# Patient Record
Sex: FEMALE | Race: WHITE | NOT HISPANIC OR LATINO | Employment: OTHER | URBAN - METROPOLITAN AREA
[De-identification: names, ages, dates, MRNs, and addresses within clinical notes are randomized per-mention and may not be internally consistent; named-entity substitution may affect disease eponyms.]

---

## 2019-04-12 ENCOUNTER — HOSPITAL ENCOUNTER (EMERGENCY)
Facility: MEDICAL CENTER | Age: 74
End: 2019-04-12
Attending: EMERGENCY MEDICINE
Payer: MEDICARE

## 2019-04-12 VITALS
SYSTOLIC BLOOD PRESSURE: 132 MMHG | RESPIRATION RATE: 18 BRPM | BODY MASS INDEX: 27.25 KG/M2 | HEART RATE: 70 BPM | OXYGEN SATURATION: 94 % | TEMPERATURE: 98.2 F | DIASTOLIC BLOOD PRESSURE: 74 MMHG | HEIGHT: 65 IN | WEIGHT: 163.58 LBS

## 2019-04-12 DIAGNOSIS — R21 RASH: ICD-10-CM

## 2019-04-12 LAB
ALBUMIN SERPL BCP-MCNC: 4.7 G/DL (ref 3.2–4.9)
ALBUMIN/GLOB SERPL: 1.6 G/DL
ALP SERPL-CCNC: 54 U/L (ref 30–99)
ALT SERPL-CCNC: 19 U/L (ref 2–50)
ANION GAP SERPL CALC-SCNC: 7 MMOL/L (ref 0–11.9)
AST SERPL-CCNC: 24 U/L (ref 12–45)
BASOPHILS # BLD AUTO: 1.4 % (ref 0–1.8)
BASOPHILS # BLD: 0.1 K/UL (ref 0–0.12)
BILIRUB SERPL-MCNC: 0.4 MG/DL (ref 0.1–1.5)
BUN SERPL-MCNC: 20 MG/DL (ref 8–22)
CALCIUM SERPL-MCNC: 10.2 MG/DL (ref 8.5–10.5)
CHLORIDE SERPL-SCNC: 105 MMOL/L (ref 96–112)
CO2 SERPL-SCNC: 27 MMOL/L (ref 20–33)
CREAT SERPL-MCNC: 0.82 MG/DL (ref 0.5–1.4)
EOSINOPHIL # BLD AUTO: 0.72 K/UL (ref 0–0.51)
EOSINOPHIL NFR BLD: 10.1 % (ref 0–6.9)
ERYTHROCYTE [DISTWIDTH] IN BLOOD BY AUTOMATED COUNT: 44.2 FL (ref 35.9–50)
GLOBULIN SER CALC-MCNC: 2.9 G/DL (ref 1.9–3.5)
GLUCOSE SERPL-MCNC: 97 MG/DL (ref 65–99)
HCT VFR BLD AUTO: 42.6 % (ref 37–47)
HGB BLD-MCNC: 13.7 G/DL (ref 12–16)
IMM GRANULOCYTES # BLD AUTO: 0.01 K/UL (ref 0–0.11)
IMM GRANULOCYTES NFR BLD AUTO: 0.1 % (ref 0–0.9)
LYMPHOCYTES # BLD AUTO: 1.95 K/UL (ref 1–4.8)
LYMPHOCYTES NFR BLD: 27.4 % (ref 22–41)
MCH RBC QN AUTO: 31.2 PG (ref 27–33)
MCHC RBC AUTO-ENTMCNC: 32.2 G/DL (ref 33.6–35)
MCV RBC AUTO: 97 FL (ref 81.4–97.8)
MONOCYTES # BLD AUTO: 0.5 K/UL (ref 0–0.85)
MONOCYTES NFR BLD AUTO: 7 % (ref 0–13.4)
NEUTROPHILS # BLD AUTO: 3.84 K/UL (ref 2–7.15)
NEUTROPHILS NFR BLD: 54 % (ref 44–72)
NRBC # BLD AUTO: 0 K/UL
NRBC BLD-RTO: 0 /100 WBC
PLATELET # BLD AUTO: 350 K/UL (ref 164–446)
PMV BLD AUTO: 8.9 FL (ref 9–12.9)
POTASSIUM SERPL-SCNC: 4.4 MMOL/L (ref 3.6–5.5)
PROT SERPL-MCNC: 7.6 G/DL (ref 6–8.2)
RBC # BLD AUTO: 4.39 M/UL (ref 4.2–5.4)
SODIUM SERPL-SCNC: 139 MMOL/L (ref 135–145)
T4 FREE SERPL-MCNC: 0.72 NG/DL (ref 0.53–1.43)
TSH SERPL DL<=0.005 MIU/L-ACNC: 3.27 UIU/ML (ref 0.38–5.33)
WBC # BLD AUTO: 7.1 K/UL (ref 4.8–10.8)

## 2019-04-12 PROCEDURE — 85025 COMPLETE CBC W/AUTO DIFF WBC: CPT

## 2019-04-12 PROCEDURE — 84443 ASSAY THYROID STIM HORMONE: CPT

## 2019-04-12 PROCEDURE — 84439 ASSAY OF FREE THYROXINE: CPT

## 2019-04-12 PROCEDURE — 99284 EMERGENCY DEPT VISIT MOD MDM: CPT

## 2019-04-12 PROCEDURE — 700111 HCHG RX REV CODE 636 W/ 250 OVERRIDE (IP): Performed by: EMERGENCY MEDICINE

## 2019-04-12 PROCEDURE — 80053 COMPREHEN METABOLIC PANEL: CPT

## 2019-04-12 RX ORDER — CEPHALEXIN 500 MG/1
500 CAPSULE ORAL 3 TIMES DAILY
Qty: 30 CAP | Refills: 0 | Status: SHIPPED | OUTPATIENT
Start: 2019-04-12 | End: 2019-04-22

## 2019-04-12 RX ORDER — IBUPROFEN 200 MG
200 TABLET ORAL EVERY 6 HOURS PRN
Status: SHIPPED | COMMUNITY
End: 2022-07-12

## 2019-04-12 RX ORDER — PREDNISONE 20 MG/1
20 TABLET ORAL DAILY
Qty: 3 TAB | Refills: 0 | Status: SHIPPED | OUTPATIENT
Start: 2019-04-12 | End: 2019-11-21

## 2019-04-12 RX ORDER — PREDNISONE 20 MG/1
20 TABLET ORAL ONCE
Status: COMPLETED | OUTPATIENT
Start: 2019-04-12 | End: 2019-04-12

## 2019-04-12 RX ORDER — ACETAMINOPHEN 325 MG/1
650 TABLET ORAL EVERY 4 HOURS PRN
COMMUNITY
End: 2020-03-13

## 2019-04-12 RX ADMIN — PREDNISONE 20 MG: 20 TABLET ORAL at 13:35

## 2019-04-12 NOTE — ED PROVIDER NOTES
ED Provider Note    Chief Complaint:   Rash    HPI:  Patrica Qiu is a 73 y.o. female who presents with rash.  Symptoms began 6 months ago, when patient noticed a small rash on her back.  This was initially thought to be herpes zoster, she was treated with a course of acyclovir.  Rash did not significantly improved, seem to worsen 4 months ago.  She was seen by another physician and treated again with acyclovir for zoster.  Rash has progressively spread and worsened since that time.  She has had no associated fevers, no bruising, no blistering lesions.  Denies any headaches, neck pain, nor back pain.  Denies any new exposures, though she did temporarily start washing her closed with a different detergent, she has since switched back to her regular laundry regimen.  Denies any new foods.    There is a strong family history of thyroid disorder, patient has never been diagnosed with thyroid disorder.  She is no chest pain, no shortness of breath, no diarrhea, no vomiting.  No history of severe allergic reaction.    Review of Systems:  See HPI for pertinent positives and negatives. All other systems negative.    Past Medical History:       Social History:  Social History     Social History Main Topics   • Smoking status: Current Every Day Smoker     Packs/day: 0.50   • Smokeless tobacco: Never Used   • Alcohol use Yes      Comment: 1/day   • Drug use: No   • Sexual activity: Not on file       Surgical History:   has a past surgical history that includes tubal ligation (1969) and dilation and curettage (1990).    Current Medications:  Home Medications     Reviewed by Kia Avina R.N. (Registered Nurse) on 04/12/19 at 1216  Med List Status: Complete   Medication Last Dose Status   acetaminophen (TYLENOL) 325 MG Tab 4/12/2019 Active   ibuprofen (MOTRIN) 100 MG/5ML Suspension  Active   ibuprofen (MOTRIN) 200 MG Tab 4/12/2019 Active                Allergies:  No Known Allergies    Physical Exam:  Vital  "Signs: /74   Pulse 70   Temp 36.8 °C (98.2 °F) (Temporal)   Resp 18   Ht 1.651 m (5' 5\")   Wt 74.2 kg (163 lb 9.3 oz)   SpO2 94%   BMI 27.22 kg/m²   Constitutional: Alert, no acute distress  HENT: Moist mucus membranes, no intraoral lesions  Eyes: Pupils equal and reactive, normal conjunctiva  Neck: Supple, normal range of motion, no stridor  Cardiovascular: Extremities are warm and well perfused, no murmur appreciated, normal cardiac auscultation  Pulmonary: No respiratory distress, normal work of breathing, no accessory muscule usage, breath sounds clear and equal bilaterally  Abdomen: Soft, non-distended, non-tender to palpation, no peritoneal signs  Skin: Warm, dry, multiple excoriated lesions in the areas where she is able to scratch, scaling skin bilaterally across the upper back.  No vesicular lesions, no lesions that are present in a dermatomal distribution, no weeping skin.  No petechiae, no purpura, no desquamating areas.  Musculoskeletal: Normal range of motion in all extremities, no swelling or deformity noted  Neurologic: Alert, oriented, normal speech, normal motor function  Psychiatric: Normal and appropriate mood and affect    No recent medical records available for review.    Labs:  Labs Reviewed   CBC WITH DIFFERENTIAL - Abnormal; Notable for the following:        Result Value    MCHC 32.2 (*)     MPV 8.9 (*)     Eosinophils 10.10 (*)     Eos (Absolute) 0.72 (*)     All other components within normal limits   COMP METABOLIC PANEL   TSH   FREE THYROXINE   ESTIMATED GFR     ED Medications Administered:  Medications   predniSONE (DELTASONE) tablet 20 mg (20 mg Oral Given 4/12/19 1335)       Differential diagnosis:  Eczema, contact dermatitis, allergic reaction, superimposed bacterial infection, Thyroid disorder, id reaction    MDM:  History and physical exam as documented above.  Patient presents out of concern for shingles, however appearance is not dermatomal at all.  Appearance is more " similar with a contact dermatitis or allergic dermatitis.  Rash is worse in areas where she is able to scratch.  Labs evaluated, CBC and CMP are reassuring.  There is a strong history of thyroid disorder in the family, screening TSH and free T4 are within normal limits.  Plan at this time is for treatment as contact dermatitis, she is instructed to take Zyrtec and Pepcid according to label instructions.  She does not like Benadryl as it makes her drowsy.  Additionally will prescribe a short course of steroids to see if this improves her symptoms.  There are some excoriated lesions on the back that look as though there may be a secondary bacterial infection.  If her symptoms are not improved with antihistamines and steroids over the next 24-48 hours, she will start Keflex to see if that helps her symptoms.  She is referred back to primary care, counseled that she may ultimately need dermatology referral.  Return precautions given including worsening rash, fevers, lesions, or any further concerns.    Blood pressure today is greater than 120/80, patient is instructed to follow up with primary care provider for blood pressure recheck.    Disposition:  Discharged home in stable condition    Final Impression:  1. Rash      Electronically signed by: Kinza Sims, 4/12/2019 5:51 PM

## 2019-04-12 NOTE — DISCHARGE INSTRUCTIONS
You may take Zyrtec and Pepcid according to label instructions for itching.  Additionally may take Benadryl according to label instructions.  Please take the prednisone as prescribed.  Return to the emergency department if you develop any new or worsening symptoms including worsening itching, rashes, fevers, bruising, or any further concerns.    If your symptoms are not improving in 2-3 days, you may try the antibiotic in the event that this is an overlying skin infection.  Please contact your primary care physician today to schedule a follow-up appointment for complete recheck within 24-48 hours.

## 2019-04-12 NOTE — ED TRIAGE NOTES
"Chief Complaint   Patient presents with   • Herpes Zoster     x1 week, rash noted to upper chest and back     Patient ambulatory to triage with daughter; reports hx of shingles, first diagnosis Oct 2018, unknown medication \"helped rash improve, but never went away\".    Explained wait time and triage process to pt. Pt placed back out in lobby, told to notify ED tech or triage RN of any changes, verbalized understanding.    "

## 2019-04-30 ENCOUNTER — HOSPITAL ENCOUNTER (OUTPATIENT)
Dept: LAB | Facility: MEDICAL CENTER | Age: 74
End: 2019-04-30
Attending: FAMILY MEDICINE
Payer: MEDICARE

## 2019-04-30 LAB — URATE SERPL-MCNC: 3.6 MG/DL (ref 1.9–8.2)

## 2019-04-30 PROCEDURE — 84550 ASSAY OF BLOOD/URIC ACID: CPT

## 2019-04-30 PROCEDURE — 86038 ANTINUCLEAR ANTIBODIES: CPT

## 2019-04-30 PROCEDURE — 36415 COLL VENOUS BLD VENIPUNCTURE: CPT

## 2019-05-02 LAB — NUCLEAR IGG SER QL IA: NORMAL

## 2019-11-21 ENCOUNTER — OFFICE VISIT (OUTPATIENT)
Dept: URGENT CARE | Facility: CLINIC | Age: 74
End: 2019-11-21
Payer: MEDICARE

## 2019-11-21 VITALS
DIASTOLIC BLOOD PRESSURE: 70 MMHG | WEIGHT: 169.8 LBS | RESPIRATION RATE: 20 BRPM | TEMPERATURE: 99 F | OXYGEN SATURATION: 92 % | HEART RATE: 66 BPM | BODY MASS INDEX: 28.29 KG/M2 | HEIGHT: 65 IN | SYSTOLIC BLOOD PRESSURE: 140 MMHG

## 2019-11-21 DIAGNOSIS — J20.9 ACUTE BRONCHITIS WITH COPD (HCC): ICD-10-CM

## 2019-11-21 DIAGNOSIS — R21 RASH: ICD-10-CM

## 2019-11-21 DIAGNOSIS — J44.0 ACUTE BRONCHITIS WITH COPD (HCC): ICD-10-CM

## 2019-11-21 PROCEDURE — 99204 OFFICE O/P NEW MOD 45 MIN: CPT | Performed by: FAMILY MEDICINE

## 2019-11-21 RX ORDER — AZITHROMYCIN 250 MG/1
TABLET, FILM COATED ORAL
Qty: 6 TAB | Refills: 0 | Status: SHIPPED
Start: 2019-11-21 | End: 2020-03-13

## 2019-11-21 RX ORDER — TRIAMCINOLONE ACETONIDE 1 MG/G
1 CREAM TOPICAL 2 TIMES DAILY
Qty: 1 TUBE | Refills: 0 | Status: SHIPPED | OUTPATIENT
Start: 2019-11-21 | End: 2021-05-14

## 2019-11-21 RX ORDER — FLUOXETINE HYDROCHLORIDE 20 MG/1
CAPSULE ORAL
COMMUNITY
Start: 2019-09-18 | End: 2020-03-13

## 2019-11-21 RX ORDER — PREDNISONE 10 MG/1
40 TABLET ORAL DAILY
Qty: 20 TAB | Refills: 0 | Status: SHIPPED | OUTPATIENT
Start: 2019-11-21 | End: 2019-11-26

## 2019-11-21 RX ORDER — ALBUTEROL SULFATE 90 UG/1
AEROSOL, METERED RESPIRATORY (INHALATION)
Refills: 1 | COMMUNITY
Start: 2019-11-18 | End: 2021-02-04 | Stop reason: SDUPTHER

## 2019-11-21 ASSESSMENT — PATIENT HEALTH QUESTIONNAIRE - PHQ9: CLINICAL INTERPRETATION OF PHQ2 SCORE: 0

## 2019-11-21 ASSESSMENT — ENCOUNTER SYMPTOMS
COUGH: 1
CHILLS: 0
FEVER: 0

## 2019-11-21 NOTE — PROGRESS NOTES
Subjective:     Patrica Qiu  is a 73 y.o. female who presents for Cough (lower left side of abdomen pain as patient coughs, wheezes x 3 day) and Rash (cgest and neck, had shingles, had a skin infection before: x10/11/18 )       Cough   This is a new problem. The current episode started 1 to 4 weeks ago. The problem has been waxing and waning. The problem occurs every few minutes. The cough is non-productive. Associated symptoms include a rash. Pertinent negatives include no chest pain, chills, fever, myalgias, sore throat or shortness of breath.   Rash   This is a new problem. The current episode started more than 1 month ago. The problem has been gradually worsening since onset. The affected locations include the chest and neck. The rash is characterized by dryness, redness, itchiness and peeling. Associated symptoms include coughing. Pertinent negatives include no eye pain, fever, shortness of breath, sore throat or vomiting.   History reviewed. No pertinent past medical history.  Past Surgical History:   Procedure Laterality Date   • DILATION AND CURETTAGE  1990   • TUBAL LIGATION  1969     Social History     Socioeconomic History   • Marital status: Single     Spouse name: Not on file   • Number of children: Not on file   • Years of education: Not on file   • Highest education level: Not on file   Occupational History   • Not on file   Social Needs   • Financial resource strain: Not on file   • Food insecurity:     Worry: Not on file     Inability: Not on file   • Transportation needs:     Medical: Not on file     Non-medical: Not on file   Tobacco Use   • Smoking status: Current Every Day Smoker     Packs/day: 0.50   • Smokeless tobacco: Never Used   Substance and Sexual Activity   • Alcohol use: Not Currently     Comment: 1/day   • Drug use: No   • Sexual activity: Not on file   Lifestyle   • Physical activity:     Days per week: Not on file     Minutes per session: Not on file   • Stress: Not on  "file   Relationships   • Social connections:     Talks on phone: Not on file     Gets together: Not on file     Attends Buddhism service: Not on file     Active member of club or organization: Not on file     Attends meetings of clubs or organizations: Not on file     Relationship status: Not on file   • Intimate partner violence:     Fear of current or ex partner: Not on file     Emotionally abused: Not on file     Physically abused: Not on file     Forced sexual activity: Not on file   Other Topics Concern   • Not on file   Social History Narrative   • Not on file    History reviewed. No pertinent family history. Review of Systems   Constitutional: Negative for chills and fever.   HENT: Negative for sore throat.    Eyes: Negative for pain.   Respiratory: Positive for cough. Negative for shortness of breath.    Cardiovascular: Negative for chest pain.   Gastrointestinal: Negative for nausea and vomiting.   Genitourinary: Negative for hematuria.   Musculoskeletal: Negative for myalgias.   Skin: Positive for rash.   Neurological: Negative for dizziness.   No Known Allergies   Objective:   /70 (BP Location: Left arm, Patient Position: Sitting, BP Cuff Size: Adult)   Pulse 66   Temp 37.2 °C (99 °F) (Temporal)   Resp 20   Ht 1.651 m (5' 5\")   Wt 77 kg (169 lb 12.8 oz)   SpO2 92%   BMI 28.26 kg/m²   Physical Exam  Constitutional:       General: She is not in acute distress.     Appearance: She is well-developed.   HENT:      Head: Normocephalic and atraumatic.   Eyes:      Conjunctiva/sclera: Conjunctivae normal.      Pupils: Pupils are equal, round, and reactive to light.   Cardiovascular:      Rate and Rhythm: Normal rate and regular rhythm.      Heart sounds: No murmur.   Pulmonary:      Effort: Pulmonary effort is normal. No respiratory distress.      Breath sounds: Wheezing present. No rhonchi or rales.   Abdominal:      General: There is no distension.      Palpations: Abdomen is soft.      " Tenderness: There is no tenderness.   Skin:     General: Skin is warm and dry.      Findings: Rash present. Rash is crusting, macular and scaling.   Neurological:      Mental Status: She is alert and oriented to person, place, and time.      Sensory: No sensory deficit.      Deep Tendon Reflexes: Reflexes are normal and symmetric.           Assessment/Plan:   Assessment    1. Rash  - REFERRAL TO DERMATOLOGY  - triamcinolone acetonide (KENALOG) 0.1 % Cream; Apply 1 Film to affected area(s) 2 times a day.  Dispense: 1 Tube; Refill: 0    2. Acute bronchitis with COPD (HCC)  - REFERRAL TO PULMONOLOGY  - azithromycin (ZITHROMAX) 250 MG Tab; Take 2 tablets by mouth on day one. Take one tablet by mouth the remaining days until gone  Dispense: 6 Tab; Refill: 0  - predniSONE (DELTASONE) 10 MG Tab; Take 4 Tabs by mouth every day for 5 days.  Dispense: 20 Tab; Refill: 0    Other orders  - FLUoxetine (PROZAC) 20 MG Cap  - VENTOLIN  (90 Base) MCG/ACT Aero Soln inhalation aerosol; INHALE 2 PUFFS BY MOUTH 4 TIMES DAILY FOR 30 DAYS AS NEEDED FOR WHEEZING; Refill: 1    Differential diagnosis, natural history, supportive care, and indications for immediate follow-up discussed.

## 2019-11-22 ASSESSMENT — ENCOUNTER SYMPTOMS
MYALGIAS: 0
DIZZINESS: 0
SHORTNESS OF BREATH: 0
NAUSEA: 0
SORE THROAT: 0
VOMITING: 0
EYE PAIN: 0

## 2019-12-16 ENCOUNTER — OFFICE VISIT (OUTPATIENT)
Dept: PULMONOLOGY | Facility: HOSPICE | Age: 74
End: 2019-12-16
Payer: MEDICARE

## 2019-12-16 ENCOUNTER — HOSPITAL ENCOUNTER (OUTPATIENT)
Dept: RADIOLOGY | Facility: MEDICAL CENTER | Age: 74
End: 2019-12-16

## 2019-12-16 VITALS
HEIGHT: 65 IN | SYSTOLIC BLOOD PRESSURE: 116 MMHG | OXYGEN SATURATION: 92 % | BODY MASS INDEX: 28.91 KG/M2 | DIASTOLIC BLOOD PRESSURE: 74 MMHG | WEIGHT: 173.5 LBS | HEART RATE: 85 BPM

## 2019-12-16 DIAGNOSIS — Z23 IMMUNIZATION DUE: ICD-10-CM

## 2019-12-16 DIAGNOSIS — R05.9 COUGH: ICD-10-CM

## 2019-12-16 DIAGNOSIS — F17.200 SMOKING: ICD-10-CM

## 2019-12-16 DIAGNOSIS — Z72.0 TOBACCO ABUSE: ICD-10-CM

## 2019-12-16 PROCEDURE — G0009 ADMIN PNEUMOCOCCAL VACCINE: HCPCS | Performed by: INTERNAL MEDICINE

## 2019-12-16 PROCEDURE — 99406 BEHAV CHNG SMOKING 3-10 MIN: CPT | Mod: 25 | Performed by: INTERNAL MEDICINE

## 2019-12-16 PROCEDURE — G0008 ADMIN INFLUENZA VIRUS VAC: HCPCS | Performed by: INTERNAL MEDICINE

## 2019-12-16 PROCEDURE — 99204 OFFICE O/P NEW MOD 45 MIN: CPT | Mod: 25 | Performed by: INTERNAL MEDICINE

## 2019-12-16 PROCEDURE — 90670 PCV13 VACCINE IM: CPT | Performed by: INTERNAL MEDICINE

## 2019-12-16 PROCEDURE — 90662 IIV NO PRSV INCREASED AG IM: CPT | Performed by: INTERNAL MEDICINE

## 2019-12-16 RX ORDER — ALBUTEROL SULFATE 90 UG/1
2 AEROSOL, METERED RESPIRATORY (INHALATION) EVERY 4 HOURS PRN
Qty: 1 INHALER | Refills: 6 | Status: SHIPPED | OUTPATIENT
Start: 2019-12-16 | End: 2020-03-13 | Stop reason: SDUPTHER

## 2019-12-16 RX ORDER — TIOTROPIUM BROMIDE 18 UG/1
CAPSULE ORAL; RESPIRATORY (INHALATION)
Qty: 30 CAP | Refills: 6 | Status: SHIPPED
Start: 2019-12-16 | End: 2020-03-13

## 2019-12-16 RX ORDER — FLUTICASONE PROPIONATE 50 MCG
1-2 SPRAY, SUSPENSION (ML) NASAL DAILY
Qty: 1 BOTTLE | Refills: 6 | Status: SHIPPED | OUTPATIENT
Start: 2019-12-16 | End: 2021-05-14

## 2019-12-16 NOTE — PROGRESS NOTES
Chief Complaint   Patient presents with   • Establish Care     Referred by Dr Nilson Vitale for Acute Bronchitis with COPD       Problems:   1. Smoking    2. Tobacco abuse    3. Immunization due    4. Cough        Assessment/Plan:   # Tobacco abuse   -- Current smoker for over 30 years with 15 pack years who is now interested to quit smoking. Patient has tried nicotine patch in the past and has horrible nightmares. Discussed about smoking cessations and available therapies for 5 minutes. Risk associated with smoking includes, but not limited to increased cardiovascular disease, lung disease, cancer, and osteoporosis.  Patient is motivated now and would like to try to quit on her own. Will let us know if she need any additional therapy.  -- Check PFTs to confirm airflow obstruction   -- Referral to lung cancer screening program given risk factors   -- Immunizations as below     # Cough   -- Concern for smoker's cough vs COPD given her longstanding history of smoking. Other differentials include GERD vs asthma vs nonasthmatic eosinophilic bronchitis vs medication induced cough. Ibuprofen is associated with cough and to the HADLEY inhibitor pathway.   -- Check PFTs to confirm airflow obstruction   -- Start spiriva given high clinical risk factor for COPD and frequent use of her inhalers   -- Start flonase daily and take clarithin as needed for allergies   -- Smoking cessation as above     # Immunizations   -- Offered flu vaccination but refused.   -- Agreed to get prevnar vaccination  -- Will need PPSV23 next year     HPI: Ms. Qiu is a 73 year old female here today to establish care for cough evaluation. Patient has a longstanding history of smoking and has cut down to 3-6 cigarettes per day. Her cough started a few months back and is associated with intermittent yellow phlegm production. She state feeling short of breath on exertion. Also has intermittent running nose with post nasal drip and subjective chills.  Denies chest pain, recent sick contact, N/V, orthopnea, PND, or LE swelling. She state having a history of allergies but has never been test for it. Never had PFTs to confirm asthma or COPD.     Patient is a current smoker and has cut down to 3-6 cigs per day. She has tried nicotine patch in the past and has horrible nightmares. No weight loss or night sweats. She uses her ventolin 4 times day. She has 15 cats.       Past Medical History:   Diagnosis Date   • Chest tightness    • Cough    • Shortness of breath    • Sputum production    • Wheezing        Past Surgical History:   Procedure Laterality Date   • DILATION AND CURETTAGE  1990   • TUBAL LIGATION  1969       ROS:   Constitutional: Denies fevers, chills, night sweats, fatigue or weight loss  Eyes: Denies vision loss, pain, drainage, double vision  Ears, Nose, Throat: Denies earache, tinnitus, hoarseness  Cardiovascular: Denies chest pain, tightness, palpitations  Respiratory: See HPI  Sleep: See HPI   GI: Denies abdominal pain, nausea, vomiting, diarrhea  : Denies frequent urination, hematuria, painful urination  Musculoskeletal: Denies back pain, painful joints, sore muscles  Neurological: Denies headaches, seizures  Skin: Denies rashes, color changes  Psychiatric: Denies depression or thoughts of suicide  Hematologic: Denies bleeding tendency or clotting tendency  Allergic/Immunologic: Denies rhinitis, skin sensitivity    Social History     Socioeconomic History   • Marital status: Single     Spouse name: Not on file   • Number of children: Not on file   • Years of education: Not on file   • Highest education level: Not on file   Occupational History   • Not on file   Social Needs   • Financial resource strain: Not on file   • Food insecurity:     Worry: Not on file     Inability: Not on file   • Transportation needs:     Medical: Not on file     Non-medical: Not on file   Tobacco Use   • Smoking status: Current Every Day Smoker     Packs/day: 0.25      "Years: 32.00     Pack years: 8.00     Types: Cigarettes   • Smokeless tobacco: Never Used   Substance and Sexual Activity   • Alcohol use: Not Currently     Comment: 1/day   • Drug use: No   • Sexual activity: Not on file   Lifestyle   • Physical activity:     Days per week: Not on file     Minutes per session: Not on file   • Stress: Not on file   Relationships   • Social connections:     Talks on phone: Not on file     Gets together: Not on file     Attends Synagogue service: Not on file     Active member of club or organization: Not on file     Attends meetings of clubs or organizations: Not on file     Relationship status: Not on file   • Intimate partner violence:     Fear of current or ex partner: Not on file     Emotionally abused: Not on file     Physically abused: Not on file     Forced sexual activity: Not on file   Other Topics Concern   • Not on file   Social History Narrative   • Not on file     Patient has no known allergies.  Current Outpatient Medications on File Prior to Visit   Medication Sig Dispense Refill   • FLUoxetine (PROZAC) 20 MG Cap      • VENTOLIN  (90 Base) MCG/ACT Aero Soln inhalation aerosol INHALE 2 PUFFS BY MOUTH 4 TIMES DAILY FOR 30 DAYS AS NEEDED FOR WHEEZING  1   • azithromycin (ZITHROMAX) 250 MG Tab Take 2 tablets by mouth on day one. Take one tablet by mouth the remaining days until gone 6 Tab 0   • triamcinolone acetonide (KENALOG) 0.1 % Cream Apply 1 Film to affected area(s) 2 times a day. 1 Tube 0   • ibuprofen (MOTRIN) 100 MG/5ML Suspension Take 10 mg/kg by mouth every 6 hours as needed.     • ibuprofen (MOTRIN) 200 MG Tab Take 200 mg by mouth every 6 hours as needed.     • acetaminophen (TYLENOL) 325 MG Tab Take 650 mg by mouth every four hours as needed.       No current facility-administered medications on file prior to visit.      /74 (BP Location: Left arm, Patient Position: Sitting, BP Cuff Size: Adult)   Pulse 85   Ht 1.651 m (5' 5\")   Wt 78.7 kg (173 " lb 8 oz)   SpO2 92%   History reviewed. No pertinent family history.    There is no immunization history on file for this patient.      Physical Exam:  General: Nontoxic appearing.   HEENT: PERRLA, EOMI, no scleral icterus, no nasal or oral lesions  Neck: No thyromegaly, no adenopathy, no bruits  Mallampatti: Grade II  Lungs: Equal breath sounds, no wheezes or crackles  Heart: Regular rate and rhythm, no gallops or murmurs  Abdomen: Soft, benign, no organomegaly  Extremities: No clubbing, cyanosis, or edema  Neurologic: Cranial nerve, motor, and sensory exam are normal    CXR reviewed: no acute pulmonary process    Shanua Oliver MD   Pulmonary Critical Care   [facility]

## 2019-12-17 ENCOUNTER — TELEPHONE (OUTPATIENT)
Dept: HEMATOLOGY ONCOLOGY | Facility: MEDICAL CENTER | Age: 74
End: 2019-12-17

## 2020-03-10 ENCOUNTER — APPOINTMENT (RX ONLY)
Dept: URBAN - NONMETROPOLITAN AREA CLINIC 15 | Facility: CLINIC | Age: 75
Setting detail: DERMATOLOGY
End: 2020-03-10

## 2020-03-10 DIAGNOSIS — R21 RASH AND OTHER NONSPECIFIC SKIN ERUPTION: ICD-10-CM

## 2020-03-10 PROCEDURE — ? COUNSELING

## 2020-03-10 PROCEDURE — 99202 OFFICE O/P NEW SF 15 MIN: CPT

## 2020-03-10 PROCEDURE — ? PRESCRIPTION

## 2020-03-10 PROCEDURE — ? ORDER TESTS

## 2020-03-10 RX ORDER — MUPIROCIN 20 MG/G
OINTMENT TOPICAL
Qty: 1 | Refills: 3 | Status: ERX | COMMUNITY
Start: 2020-03-10

## 2020-03-10 RX ORDER — TRIAMCINOLONE ACETONIDE 1 MG/G
CREAM TOPICAL BID
Qty: 1 | Refills: 3 | Status: ERX | COMMUNITY
Start: 2020-03-10

## 2020-03-10 RX ADMIN — TRIAMCINOLONE ACETONIDE: 1 CREAM TOPICAL at 00:00

## 2020-03-10 RX ADMIN — MUPIROCIN: 20 OINTMENT TOPICAL at 00:00

## 2020-03-10 ASSESSMENT — LOCATION ZONE DERM
LOCATION ZONE: FACE
LOCATION ZONE: LEG
LOCATION ZONE: ARM
LOCATION ZONE: TRUNK

## 2020-03-10 ASSESSMENT — LOCATION SIMPLE DESCRIPTION DERM
LOCATION SIMPLE: RIGHT FOREHEAD
LOCATION SIMPLE: CHEST
LOCATION SIMPLE: LEFT FOREARM
LOCATION SIMPLE: LEFT POSTERIOR THIGH

## 2020-03-10 ASSESSMENT — LOCATION DETAILED DESCRIPTION DERM
LOCATION DETAILED: LEFT PROXIMAL POSTERIOR THIGH
LOCATION DETAILED: LEFT PROXIMAL DORSAL FOREARM
LOCATION DETAILED: MIDDLE STERNUM
LOCATION DETAILED: RIGHT INFERIOR LATERAL FOREHEAD
LOCATION DETAILED: STERNAL NOTCH

## 2020-03-10 NOTE — PROCEDURE: ORDER TESTS
Bill For Surgical Tray: no
Billing Type: Third-Party Bill
Expected Date Of Service: 03/10/2020
Performing Laboratory: [id number]

## 2020-03-10 NOTE — PROCEDURE: COUNSELING
Detail Level: Zone
Patient Specific Counseling (Will Not Stick From Patient To Patient): Discussed allergic rxn vs photo derm.  If pt does not respond to tx, bx and further tx is needed.

## 2020-03-13 ENCOUNTER — NON-PROVIDER VISIT (OUTPATIENT)
Dept: PULMONOLOGY | Facility: HOSPICE | Age: 75
End: 2020-03-13
Attending: INTERNAL MEDICINE
Payer: MEDICARE

## 2020-03-13 ENCOUNTER — OFFICE VISIT (OUTPATIENT)
Dept: PULMONOLOGY | Facility: HOSPICE | Age: 75
End: 2020-03-13
Payer: MEDICARE

## 2020-03-13 VITALS
DIASTOLIC BLOOD PRESSURE: 70 MMHG | HEIGHT: 65 IN | SYSTOLIC BLOOD PRESSURE: 126 MMHG | BODY MASS INDEX: 29.16 KG/M2 | OXYGEN SATURATION: 92 % | HEART RATE: 75 BPM | WEIGHT: 175 LBS

## 2020-03-13 VITALS — HEIGHT: 65 IN | WEIGHT: 175 LBS | BODY MASS INDEX: 29.16 KG/M2

## 2020-03-13 DIAGNOSIS — Z72.0 TOBACCO ABUSE: ICD-10-CM

## 2020-03-13 DIAGNOSIS — F17.200 SMOKING: ICD-10-CM

## 2020-03-13 DIAGNOSIS — J45.40 MODERATE PERSISTENT ASTHMA, UNSPECIFIED WHETHER COMPLICATED: ICD-10-CM

## 2020-03-13 DIAGNOSIS — R05.9 COUGH: ICD-10-CM

## 2020-03-13 DIAGNOSIS — J44.9 MODERATE COPD (CHRONIC OBSTRUCTIVE PULMONARY DISEASE) (HCC): ICD-10-CM

## 2020-03-13 PROCEDURE — 99213 OFFICE O/P EST LOW 20 MIN: CPT | Mod: 25 | Performed by: INTERNAL MEDICINE

## 2020-03-13 PROCEDURE — 94729 DIFFUSING CAPACITY: CPT | Performed by: INTERNAL MEDICINE

## 2020-03-13 PROCEDURE — 99406 BEHAV CHNG SMOKING 3-10 MIN: CPT | Performed by: INTERNAL MEDICINE

## 2020-03-13 PROCEDURE — 94060 EVALUATION OF WHEEZING: CPT | Performed by: INTERNAL MEDICINE

## 2020-03-13 PROCEDURE — 94726 PLETHYSMOGRAPHY LUNG VOLUMES: CPT | Performed by: INTERNAL MEDICINE

## 2020-03-13 RX ORDER — ALBUTEROL SULFATE 90 UG/1
2 AEROSOL, METERED RESPIRATORY (INHALATION) EVERY 4 HOURS PRN
Qty: 1 INHALER | Refills: 6 | Status: SHIPPED | OUTPATIENT
Start: 2020-03-13 | End: 2021-05-14

## 2020-03-13 RX ORDER — AZELASTINE 1 MG/ML
1-2 SPRAY, METERED NASAL 2 TIMES DAILY
Qty: 1 BOTTLE | Refills: 11 | Status: SHIPPED | OUTPATIENT
Start: 2020-03-13 | End: 2021-05-14

## 2020-03-13 RX ORDER — MONTELUKAST SODIUM 10 MG/1
10 TABLET ORAL EVERY EVENING
Qty: 30 TAB | Refills: 3 | Status: SHIPPED | OUTPATIENT
Start: 2020-03-13 | End: 2021-05-14

## 2020-03-13 RX ORDER — BENZOCAINE/MENTHOL 6 MG-10 MG
LOZENGE MUCOUS MEMBRANE 2 TIMES DAILY
COMMUNITY
End: 2022-07-12

## 2020-03-13 ASSESSMENT — PULMONARY FUNCTION TESTS
FVC_PREDICTED: 2.82
FEV1/FVC_PERCENT_CHANGE: -1
FEV1_PERCENT_PREDICTED: 64
FEV1: 1.39
FVC_PERCENT_PREDICTED: 81
FEV1/FVC_PERCENT_LLN: 65
FEV1/FVC_PREDICTED: 78
FEV1/FVC_PERCENT_PREDICTED: 89
FEV1/FVC_PERCENT_CHANGE: 86
FVC: 2.3
FEV1/FVC: 69
FVC_PERCENT_PREDICTED: 71
FEV1: 1.56
FEV1/FVC_PERCENT_PREDICTED: 87
FVC_LLN: 2.35
FEV1_PREDICTED: 2.16
FEV1/FVC: 67.83
FEV1_PERCENT_PREDICTED: 72
FEV1_PERCENT_CHANGE: 12
FEV1/FVC: 69
FEV1/FVC_PERCENT_PREDICTED: 77
FVC: 2.02
FEV1/FVC: 68
FEV1/FVC_PERCENT_PREDICTED: 90
FEV1/FVC_PERCENT_PREDICTED: 89
FEV1_LLN: 1.81
FEV1_PERCENT_CHANGE: 14

## 2020-03-13 ASSESSMENT — FIBROSIS 4 INDEX
FIB4 SCORE: 1.16
FIB4 SCORE: 1.16

## 2020-03-13 NOTE — PROCEDURES
Technician: DARLENE Mendes    Technician Comment:  Good patient effort & cooperation.  The results of this test meet the ATS/ERS standards for acceptability & reproducibility.  Test was performed on the [email or web address] Body Plethysmograph-Elite DX system.  Predicted values were GLI-2012 for spirometry, GLI-2017 for DLCO, ITS for Lung Volumes.  The DLCO was uncorrected for Hgb.  A bronchodilator of Ventolin HFA -2puffs via spacer administered.  DLCO performed during dilation period.    Interpretation:  This exam is performed with a primary diagnosis of COPD to help establish a pre-test probability of the patient’s diagnostic findings.     The Flow Volume Loop is of sufficient quality and the volume-time graph demonstrates an adequate exhalation to provide a confident interpretation. Review of the flow-volume loop reveals a concave pattern suggestive of obstruction.    The FEV1/FVC ratio is reduced with an abnormally reduced FEV1 and FVC based on predicted values. This pattern is consistent with an obstructive ventilatory defect which is moderate by percent predicted FEV1. After administration of albuterol, the patient achieved a significant bronchodilator response (14% and 280 ml in FVC).     Total lung capacity is within the limits of predicted values.  The restrictive pattern suggested by spirometry (FVC & FEV1) is likely an artifact of pseudorestriction due to substantial “air trapping” seen in the elevated residual volume measurements. The DLCO is significantly elevated (> 120% predicted) after corrected for alveolar volume.     Impression:   1. Moderate airflow obstruction.   2. There is significant response to bronchodilator. In the proper clinical setting of cough, wheeze, chest pressure, and/or dyspnea, this study would be supportive of a diagnosis of asthma.  3. Air trapping   4. Elevated diffusion capacity which may be seen with asthma, polycythemia, left-to-right shunts, supine positing, exercise. Clinical  correlation recommended.

## 2020-03-13 NOTE — PROGRESS NOTES
Chief Complaint   Patient presents with   • Follow-Up     Cough. Last seen 12/16/19   • Results     PFT 03/13/2020   • Medication Management     Trial: Spiriva       Problems:   1. Moderate persistent asthma, unspecified whether complicated    2. Cough    3. Moderate COPD (chronic obstructive pulmonary disease) (HCC)    4. Tobacco abuse        Assessment/Plan:   # Tobacco abuse   -- Current smoker for over 30 years with 15 pack years who is trying to quit on her own. Discussed about smoking cessations and available therapies for 4 minutes. She opted that she will try to quit on her own and will let us know if she need any therapy.      # Cough   -- Secondary to untreated asthma given PFTs findings and post nasal drip    -- Start singulair and breo once daily   -- Added astelin BID for post nasal drip    # Moderate COPD/asthma   -- Confirmed on PFTs   -- Did not tolerate LAMA due to vision changes and difficulty focusing   -- Start breo and singulair    -- Smoking cessation as above   -- Discussed about the importance of aggressively treating her sinobronchitis as nasal drip is known to have elevated IL-5 which can stimulates eosinophil colony formation, making her asthma worsen and harder to control   -- Up to date on PPSV23 and influenza vaccinations. Need PPSV23 12/2020.     HPI:  Ms. Qiu is a 73 year old female with history of COPD and tobacco abuse here today for follow up. Patient was last seen back on 12/16/2019 for cough evaluation. She was started on a trial of spiriva and developed vision changes and had difficulty focusing which she stopped using it. Today, she reports having intermittent chest tightness and post nasal drip causing her to cough. She also state having occassional shortness of breath on exertion while walking uphill. Her symptoms appear to be worse during windy days and cold weather. Denies chest pain, N/V, fever/chills, myalgia, recent contact. She is still smoking about 3-6 cigs per  day and is trying to cut down.     Review of her PFTs showed moderate airflow obstruction with significant response to bronchodilator suggestive of airway reactive disease and air trapping.       Past Medical History:   Diagnosis Date   • Chest tightness    • Cough    • Shortness of breath    • Sputum production    • Wheezing        Past Surgical History:   Procedure Laterality Date   • DILATION AND CURETTAGE  1990   • TUBAL LIGATION  1969       ROS:   Constitutional: Denies fevers, chills, night sweats, fatigue or weight loss  Eyes: Denies vision loss, pain, drainage, double vision  Ears, Nose, Throat: Denies earache, tinnitus, hoarseness  Cardiovascular: Denies chest pain, tightness, palpitations  Respiratory: See HPI  GI: Denies abdominal pain, nausea, vomiting, diarrhea  : Denies frequent urination, hematuria, painful urination  Musculoskeletal: Denies back pain, painful joints, sore muscles  Neurological: Denies headaches, seizures  Skin: Denies rashes, color changes  Psychiatric: Denies depression or thoughts of suicide  Hematologic: Denies bleeding tendency or clotting tendency  Allergic/Immunologic: Denies rhinitis, skin sensitivity    Social History     Socioeconomic History   • Marital status: Single     Spouse name: Not on file   • Number of children: Not on file   • Years of education: Not on file   • Highest education level: Not on file   Occupational History   • Not on file   Social Needs   • Financial resource strain: Not on file   • Food insecurity     Worry: Not on file     Inability: Not on file   • Transportation needs     Medical: Not on file     Non-medical: Not on file   Tobacco Use   • Smoking status: Current Every Day Smoker     Packs/day: 0.25     Years: 32.00     Pack years: 8.00     Types: Cigarettes   • Smokeless tobacco: Never Used   Substance and Sexual Activity   • Alcohol use: Not Currently     Comment: 1/day   • Drug use: No   • Sexual activity: Not on file   Lifestyle   • Physical  "activity     Days per week: Not on file     Minutes per session: Not on file   • Stress: Not on file   Relationships   • Social connections     Talks on phone: Not on file     Gets together: Not on file     Attends [employer]ism service: Not on file     Active member of club or organization: Not on file     Attends meetings of clubs or organizations: Not on file     Relationship status: Not on file   • Intimate partner violence     Fear of current or ex partner: Not on file     Emotionally abused: Not on file     Physically abused: Not on file     Forced sexual activity: Not on file   Other Topics Concern   • Not on file   Social History Narrative   • Not on file     Patient has no known allergies.  Current Outpatient Medications on File Prior to Visit   Medication Sig Dispense Refill   • hydrocortisone ([employer] CORTISONE MAXIMUM STRENGTH) 1 % Cream Apply  to affected area(s) 2 times a day.     • fluticasone (FLONASE) 50 MCG/ACT nasal spray Spray 1-2 Sprays in nose every day. Each nostril. 1 Bottle 6   • albuterol (VENTOLIN HFA) 108 (90 Base) MCG/ACT Aero Soln inhalation aerosol Inhale 2 Puffs by mouth every four hours as needed for Shortness of Breath (Wheezing). 1 Inhaler 6   • VENTOLIN  (90 Base) MCG/ACT Aero Soln inhalation aerosol INHALE 2 PUFFS BY MOUTH 4 TIMES DAILY FOR 30 DAYS AS NEEDED FOR WHEEZING  1   • triamcinolone acetonide (KENALOG) 0.1 % Cream Apply 1 Film to affected area(s) 2 times a day. 1 Tube 0   • ibuprofen (MOTRIN) 100 MG/5ML Suspension Take 10 mg/kg by mouth every 6 hours as needed.     • ibuprofen (MOTRIN) 200 MG Tab Take 200 mg by mouth every 6 hours as needed.       No current facility-administered medications on file prior to visit.      /70 (BP Location: Right arm, Patient Position: Sitting, BP Cuff Size: Adult)   Pulse 75   Ht 1.651 m (5' 5\")   Wt 79.4 kg (175 lb)   SpO2 92%   History reviewed. No pertinent family history.  Immunization History   Administered Date(s) " Administered   • Influenza Vaccine Adult HD 12/16/2019   • Pneumococcal Conjugate Vaccine (Prevnar/PCV-13) 12/16/2019         Physical Exam:  General: NAD.   HEENT: PERRLA, EOMI, no scleral icterus, no nasal or oral lesions  Neck: No thyromegaly, no adenopathy, no bruits  Mallampatti: Grade II  Lungs: Mild expiratory wheezes, no crackles  Heart: Regular rate and rhythm, no gallops or murmurs  Abdomen: Soft, benign, no organomegaly  Extremities: No clubbing, cyanosis, or edema  Neurologic: Cranial nerve, motor, and sensory exam are normal    Diagnostic Test:  PFTs personally reviewed:   Moderate airflow obstruction with significant response to bronchodilator and air trapping.    Shauna Oliver MD   Pulmonary Critical Care   [facility]

## 2020-03-24 ENCOUNTER — APPOINTMENT (RX ONLY)
Dept: URBAN - NONMETROPOLITAN AREA CLINIC 15 | Facility: CLINIC | Age: 75
Setting detail: DERMATOLOGY
End: 2020-03-24

## 2020-03-24 DIAGNOSIS — R21 RASH AND OTHER NONSPECIFIC SKIN ERUPTION: ICD-10-CM | Status: IMPROVED

## 2020-03-24 PROCEDURE — ? ADDITIONAL NOTES

## 2020-03-24 PROCEDURE — ? COUNSELING

## 2020-03-24 PROCEDURE — 99212 OFFICE O/P EST SF 10 MIN: CPT

## 2020-03-24 ASSESSMENT — LOCATION ZONE DERM: LOCATION ZONE: TRUNK

## 2020-03-24 ASSESSMENT — LOCATION SIMPLE DESCRIPTION DERM: LOCATION SIMPLE: CHEST

## 2020-03-24 ASSESSMENT — LOCATION DETAILED DESCRIPTION DERM: LOCATION DETAILED: STERNAL NOTCH

## 2020-03-24 NOTE — PROCEDURE: MIPS QUALITY
Quality 226: Preventive Care And Screening: Tobacco Use: Screening And Cessation Intervention: Patient screened for tobacco use and is an ex/non-smoker
Quality 130: Documentation Of Current Medications In The Medical Record: Current Medications Documented
Quality 111:Pneumonia Vaccination Status For Older Adults: Pneumococcal Vaccination Previously Received
Detail Level: Detailed

## 2020-03-24 NOTE — PROCEDURE: COUNSELING
Patient Specific Counseling (Will Not Stick From Patient To Patient): Discussed allergic rxn vs photo derm.  If pt does not respond to tx, bx and further tx is needed.
Detail Level: Zone

## 2020-03-24 NOTE — PROCEDURE: ADDITIONAL NOTES
Additional Notes: Discussed cultures results with patient. Told patient to take bleach baths &/or Oxy10 acne wash.
Detail Level: Simple

## 2020-07-27 ENCOUNTER — APPOINTMENT (OUTPATIENT)
Dept: PULMONOLOGY | Facility: HOSPICE | Age: 75
End: 2020-07-27
Payer: MEDICARE

## 2020-12-03 ENCOUNTER — PATIENT MESSAGE (OUTPATIENT)
Dept: SLEEP MEDICINE | Facility: MEDICAL CENTER | Age: 75
End: 2020-12-03

## 2020-12-08 NOTE — PATIENT COMMUNICATION
Requested Records from Williamson Pamela.    Tried to call pt. Georgi regarding her [email or web address] message.    Sent pt [email or web address] message

## 2021-01-12 ENCOUNTER — PATIENT MESSAGE (OUTPATIENT)
Dept: SLEEP MEDICINE | Facility: MEDICAL CENTER | Age: 76
End: 2021-01-12

## 2021-01-12 DIAGNOSIS — Z23 NEED FOR VACCINATION: ICD-10-CM

## 2021-01-18 NOTE — PATIENT COMMUNICATION
Booker Quick M.D.  You 21 hours ago (10:16 AM)     Let patient know as far as we know there is no contraindication to take the covid vaccine   Keep her rescue inhaler with her when she gets the vaccine

## 2021-01-25 DIAGNOSIS — J44.9 CHRONIC OBSTRUCTIVE PULMONARY DISEASE, UNSPECIFIED COPD TYPE (HCC): ICD-10-CM

## 2021-01-25 DIAGNOSIS — R06.2 WHEEZING: ICD-10-CM

## 2021-01-25 DIAGNOSIS — R06.02 SOB (SHORTNESS OF BREATH): ICD-10-CM

## 2021-01-25 RX ORDER — ALBUTEROL SULFATE 90 UG/1
2 AEROSOL, METERED RESPIRATORY (INHALATION) EVERY 4 HOURS PRN
Qty: 1 EACH | Refills: 6 | Status: SHIPPED | OUTPATIENT
Start: 2021-01-25 | End: 2021-05-14

## 2021-01-25 NOTE — TELEPHONE ENCOUNTER
Have we ever prescribed this med? Yes.  If yes, what date?     Last OV: 3/13/2020 - Dr. Oliver    Next OV: 5//14/2021 Dr Oliver    DX: COPD    Medications: Ventolin   2.1

## 2021-01-25 NOTE — TELEPHONE ENCOUNTER
Patient called in today and stated that her insurance won't cover Albuterol but will cover Ventilin. She is asking for a new prescription for Ventilin. Please advise. Patient using [facility] pharmacy in [place] on [address]. Patient contact number [phone].

## 2021-01-26 ENCOUNTER — APPOINTMENT (OUTPATIENT)
Dept: SLEEP MEDICINE | Facility: MEDICAL CENTER | Age: 76
End: 2021-01-26
Payer: MEDICARE

## 2021-03-01 ENCOUNTER — PATIENT MESSAGE (OUTPATIENT)
Dept: SLEEP MEDICINE | Facility: MEDICAL CENTER | Age: 76
End: 2021-03-01

## 2021-03-01 NOTE — TELEPHONE ENCOUNTER
"From: Patrica Qiu  To: Nurse Practicioner Mandy Khoury  Sent: 3/1/2021 8:21 AM PST  Subject: Non-Urgent Medical Question    I have an ear infection and the sinus laying along side my ear canal is irritated and sometimes plugged. This seems to be a side effect caused from the medication. It seems to cause my allergies and asthma to flare up and I am forced to use my inhaler any where from 2 hours to 3 1/2 hrs.  in order to breathe. Do I need to find an ENT doctor, or does this fall within your line of expertise?  I seem to be allergic to the standard medications like flonase, Spiriva, Breo Ellipta, etc. I get incontrollable infections and they make me so groggy I cannot safely function. I am the only legal  and [place] 25 miles from town including 6 1/2 [place] unm[address]ained road, I cannot be that \"out of it\" when we need to travel into town.  "

## 2021-03-04 ENCOUNTER — PATIENT MESSAGE (OUTPATIENT)
Dept: SLEEP MEDICINE | Facility: MEDICAL CENTER | Age: 76
End: 2021-03-04

## 2021-03-04 DIAGNOSIS — H69.90 DYSFUNCTION OF EUSTACHIAN TUBE, UNSPECIFIED LATERALITY: ICD-10-CM

## 2021-03-04 DIAGNOSIS — R09.81 SINUS CONGESTION: ICD-10-CM

## 2021-03-04 DIAGNOSIS — R26.89 IMBALANCE: ICD-10-CM

## 2021-03-04 NOTE — TELEPHONE ENCOUNTER
From: Patrica Qiu  To:  Practicioner Mandy Khoury  Sent: 3/4/2021 8:05 AM PST  Subject: Non-Urgent Medical Question    You said you would refer me to an ENT for evaluation. As yet, I have not received any communication for any referral. Meanwhile I seem to be getting sicker, my ear continues to drain and the whole thing is effecting my equilibrium an overall health. I have tried to research finding an ENT on my own, but have come up with nothing, except for the fact that they require a referral from another doctor.  There is an ENT clinic in [place], at [address] , [address], [place], [place] Telephone [phone]. I tried to find an ENT through [facility] but it does not even recognize the Doctors that is listed as part of the [facility] team. Please refer me to a qualified Dr. so I can get this infection cared for before it gets worse      ----- Message -----   From:Nurse Practicioner Mandy Khoury   Sent:3/1/2021 9:16 AM PST   To:Patrica Qiu   Subject:RE: Non-Urgent Medical Question    This is not necessarily a reaction to the inhaler medication or singulair. You may also have ongoing sinus issues that need to be addressed by an ear nose throat MD. I would like to refer you to have this evaluated.  Please let me know if this is ok.      ----- Message -----   From:Patrica Qiu   Sent:3/1/2021 8:21 AM PST   To:Nurse Isabellaioner Mandy Khoury   Subject:Non-Urgent Medical Question    I have an ear infection and the sinus laying along side my ear canal is irritated and sometimes plugged. This seems to be a side effect caused from the medication. It seems to cause my allergies and asthma to flare up and I am forced to use my inhaler any where from 2 hours to 3 1/2 hrs. in order to breathe. Do I need to find an ENT doctor, or does this fall within your line of expertise?  I seem to be allergic to the standard medications like flonase, Spiriva, Breo Ellipta, etc. I  "get incontrollable infections and they make me so groggy I cannot safely function. I am the only legal  and we live 25 miles from town including 6 [place]/[address] [place] unmaintained road, I cannot be that \"out of it\" when we need to travel into town.  "

## 2021-05-14 ENCOUNTER — OFFICE VISIT (OUTPATIENT)
Dept: SLEEP MEDICINE | Facility: MEDICAL CENTER | Age: 76
End: 2021-05-14
Payer: MEDICARE

## 2021-05-14 VITALS
DIASTOLIC BLOOD PRESSURE: 76 MMHG | HEIGHT: 65 IN | BODY MASS INDEX: 29.24 KG/M2 | WEIGHT: 175.5 LBS | OXYGEN SATURATION: 95 % | SYSTOLIC BLOOD PRESSURE: 142 MMHG | HEART RATE: 76 BPM

## 2021-05-14 DIAGNOSIS — Z72.0 TOBACCO ABUSE: ICD-10-CM

## 2021-05-14 DIAGNOSIS — J44.9 CHRONIC OBSTRUCTIVE PULMONARY DISEASE, UNSPECIFIED COPD TYPE (HCC): ICD-10-CM

## 2021-05-14 PROCEDURE — 99406 BEHAV CHNG SMOKING 3-10 MIN: CPT | Performed by: INTERNAL MEDICINE

## 2021-05-14 PROCEDURE — 99213 OFFICE O/P EST LOW 20 MIN: CPT | Mod: 25 | Performed by: INTERNAL MEDICINE

## 2021-05-14 NOTE — PROGRESS NOTES
Chief Complaint   Patient presents with   • Asthma     Last seen 03/13/20   • Other     CXR 11/30/20       Problems:   1. Chronic obstructive pulmonary disease, unspecified COPD type (HCC)    2. Tobacco abuse        Assessment/Plan:   # Tobacco abuse   -- Current smoker for over 30 years who is not ready to quit smoking. Discussed about smoking cessations and available therapies for 4 minutes. Advised patient to let us know when she is ready to quit smoking.      # Moderate COPD/asthma   -- Confirmed on PFTs   -- mMRC 1 and ACT score 16  -- Cont albuterol as needed   -- Discussed about the utilization of breo once daily which patient refused and opted to use when she is using her ventolin 3-4 times per day.   -- Smoking cessation as above   -- Check PFTs   -- Up to date on PCV13 and influenza vaccinations.   -- Due to PPSV23 when available     RTC 6 months     HPI:  Patrica Qiu is a 75 year old female with history of COPD and tobacco abuse here today for follow up. Patient was last seen back on 12/16/2019 for cough evaluation. She was started on a trial of spiriva and developed vision changes and had difficulty focusing which she stopped using it. Today, she reports having intermittent chest tightness and post nasal drip causing her to cough. She also state having occassional shortness of breath on exertion while walking uphill. Her symptoms appear to be worse during windy days and cold weather. Denies chest pain, N/V, fever/chills, myalgia, recent contact. She is still smoking about 3-6 cigs per day and is trying to cut down.      Review of her PFTs showed moderate airflow obstruction with significant response to bronchodilator suggestive of airway reactive disease and air trapping.    Interval Follow Up Visit:   Presents for follow up. Last office visit 03/13/20. Patient was started on breo last office visit. She developed difficulty concentrating which she stopped using and went back on using albuterol.  Earlier this year she started using albuterol about three times per day and went back to using breo once daily but stopped two months ago.     Subjectively, patient reports having shortness of breath on exertion. Associate with intermittent productive cough. Denies fever/chills, chest pain, N/V, or recent sick contact. Uses albuterol 1-2 times per day.      She restarted back smoking 1/4 PPD and enjoy smoking. She is not sure if she is ready to quit smoking.     Past Medical History:   Diagnosis Date   • Chest tightness    • Cough    • Shortness of breath    • Sputum production    • Wheezing        Past Surgical History:   Procedure Laterality Date   • DILATION AND CURETTAGE  1990   • TUBAL LIGATION  1969       ROS:   Constitutional: Denies fevers, chills, night sweats, fatigue or weight loss  Eyes: Denies vision loss, pain, drainage, double vision  Ears, Nose, Throat: Denies earache, tinnitus, hoarseness  Cardiovascular: Denies chest pain, tightness, palpitations  Respiratory: See HPI  GI: Denies abdominal pain, nausea, vomiting, diarrhea  : Denies frequent urination, hematuria, painful urination  Musculoskeletal: Denies back pain, painful joints, sore muscles  Neurological: Denies headaches, seizures  Skin: Denies rashes, color changes  Psychiatric: Denies depression or thoughts of suicide  Hematologic: Denies bleeding tendency or clotting tendency  Allergic/Immunologic: Denies rhinitis, skin sensitivity    Social History     Socioeconomic History   • Marital status: Single     Spouse name: Not on file   • Number of children: Not on file   • Years of education: Not on file   • Highest education level: Not on file   Occupational History   • Not on file   Tobacco Use   • Smoking status: Current Every Day Smoker     Packs/day: 0.25     Years: 32.00     Pack years: 8.00     Types: Cigarettes   • Smokeless tobacco: Never Used   Vaping Use   • Vaping Use: Former   Substance and Sexual Activity   • Alcohol use: Not  Currently     Comment: 1/day   • Drug use: No   • Sexual activity: Not on file   Other Topics Concern   • Not on file   Social History Narrative   • Not on file     Social Determinants of Health     Financial Resource Strain:    • Difficulty of Paying Living Expenses:    Food Insecurity:    • Worried About Running Out of Food in the Last Year:    • Ran Out of Food in the Last Year:    Transportation Needs:    • Lack of Transportation (Medical):    • Lack of Transportation (Non-Medical):    Physical Activity:    • Days of Exercise per Week:    • Minutes of Exercise per Session:    Stress:    • Feeling of Stress :    Social Connections:    • Frequency of Communication with Friends and Family:    • Frequency of Social Gatherings with Friends and Family:    • Attends [employer]ism Services:    • Active Member of Clubs or Organizations:    • Attends Club or Organization Meetings:    • Marital Status:    Intimate Partner Violence:    • Fear of Current or Ex-Partner:    • Emotionally Abused:    • Physically Abused:    • Sexually Abused:      Patient has no known allergies.  Current Outpatient Medications on File Prior to Visit   Medication Sig Dispense Refill   • VENTOLIN  (90 Base) MCG/ACT Aero Soln inhalation aerosol INHALE 2 PUFFS BY MOUTH 4 TIMES DAILY FOR 30 DAYS AS NEEDED FOR WHEEZING 8.5 g 5   • hydrocortisone ([employer]S CORTISONE MAXIMUM STRENGTH) 1 % Cream Apply  to affected area(s) 2 times a day.     • ibuprofen (MOTRIN) 100 MG/5ML Suspension Take 10 mg/kg by mouth every 6 hours as needed.     • ibuprofen (MOTRIN) 200 MG Tab Take 200 mg by mouth every 6 hours as needed.     • albuterol (VENTOLIN HFA) 108 (90 Base) MCG/ACT Aero Soln inhalation aerosol Inhale 2 Puffs every four hours as needed for Shortness of Breath. (Patient not taking: Reported on 5/14/2021) 1 Each 6   • Fluticasone Furoate-Vilanterol (BREO ELLIPTA) 100-25 MCG/INH AEROSOL POWDER, BREATH ACTIVATED Inhale 1 Puff by mouth every day. Rinse mouth after  "use. 2 Each 0   • Fluticasone Furoate-Vilanterol (BREO ELLIPTA) 100-25 MCG/INH AEROSOL POWDER, BREATH ACTIVATED Inhale 1 Puff by mouth every day. Rinse mouth after use. 1 Each 6   • albuterol (VENTOLIN HFA) 108 (90 Base) MCG/ACT Aero Soln inhalation aerosol Inhale 2 Puffs by mouth every four hours as needed for Shortness of Breath (Wheezing). (Patient not taking: Reported on 5/14/2021) 1 Inhaler 6   • montelukast (SINGULAIR) 10 MG Tab Take 1 Tab by mouth every evening. (Patient not taking: Reported on 5/14/2021) 30 Tab 3   • azelastine (ASTELIN) 137 MCG/SPRAY nasal spray Spray 1-2 Sprays in nose 2 times a day. (Patient not taking: Reported on 5/14/2021) 1 Bottle 11   • fluticasone (FLONASE) 50 MCG/ACT nasal spray Spray 1-2 Sprays in nose every day. Each nostril. 1 Bottle 6   • triamcinolone acetonide (KENALOG) 0.1 % Cream Apply 1 Film to affected area(s) 2 times a day. (Patient not taking: Reported on 5/14/2021) 1 Tube 0     No current facility-administered medications on file prior to visit.     /76 (BP Location: Left arm, Patient Position: Sitting, BP Cuff Size: Adult)   Pulse 76   Ht 1.651 m (5' 5\")   Wt 79.6 kg (175 lb 8 oz)   SpO2 95%   History reviewed. No pertinent family history.  Immunization History   Administered Date(s) Administered   • Influenza Vaccine Adult HD 12/16/2019   • Elvia SARS-CoV-2 Vaccine 03/04/2021   • Pneumococcal Conjugate Vaccine (Prevnar/PCV-13) 12/16/2019         Physical Exam:  General: NAD. Speaking in full sentence.   HEENT: PERRLA, EOMI, no scleral icterus, no nasal or oral lesions  Neck: No thyromegaly, no adenopathy, no bruits  Mallampatti: Grade II  Lungs: Equal breath sounds, no wheezes or crackles  Heart: Regular rate and rhythm, no gallops or murmurs  Abdomen: Soft, benign, no organomegaly  Extremities: No clubbing, cyanosis, or edema  Neurologic: Cranial nerve, motor, and sensory exam are normal    Shauna Oliver MD   Pulmonary Critical Care   [facility] "

## 2021-11-17 ENCOUNTER — NON-PROVIDER VISIT (OUTPATIENT)
Dept: SLEEP MEDICINE | Facility: MEDICAL CENTER | Age: 76
End: 2021-11-17
Attending: INTERNAL MEDICINE
Payer: MEDICARE

## 2021-11-17 ENCOUNTER — OFFICE VISIT (OUTPATIENT)
Dept: SLEEP MEDICINE | Facility: MEDICAL CENTER | Age: 76
End: 2021-11-17
Payer: MEDICARE

## 2021-11-17 VITALS — WEIGHT: 175 LBS | HEIGHT: 65 IN | BODY MASS INDEX: 29.16 KG/M2

## 2021-11-17 VITALS
RESPIRATION RATE: 16 BRPM | OXYGEN SATURATION: 96 % | DIASTOLIC BLOOD PRESSURE: 70 MMHG | WEIGHT: 175 LBS | TEMPERATURE: 97.3 F | HEART RATE: 70 BPM | BODY MASS INDEX: 29.16 KG/M2 | HEIGHT: 65 IN | SYSTOLIC BLOOD PRESSURE: 124 MMHG

## 2021-11-17 DIAGNOSIS — Z72.0 TOBACCO USE: ICD-10-CM

## 2021-11-17 DIAGNOSIS — J44.9 CHRONIC OBSTRUCTIVE PULMONARY DISEASE, UNSPECIFIED COPD TYPE (HCC): ICD-10-CM

## 2021-11-17 DIAGNOSIS — Z23 NEED FOR VACCINATION: ICD-10-CM

## 2021-11-17 DIAGNOSIS — J44.89 COPD WITH ASTHMA (HCC): ICD-10-CM

## 2021-11-17 PROCEDURE — 94726 PLETHYSMOGRAPHY LUNG VOLUMES: CPT | Performed by: INTERNAL MEDICINE

## 2021-11-17 PROCEDURE — 94729 DIFFUSING CAPACITY: CPT | Performed by: INTERNAL MEDICINE

## 2021-11-17 PROCEDURE — 94060 EVALUATION OF WHEEZING: CPT | Performed by: INTERNAL MEDICINE

## 2021-11-17 PROCEDURE — 99214 OFFICE O/P EST MOD 30 MIN: CPT | Mod: 25 | Performed by: INTERNAL MEDICINE

## 2021-11-17 RX ORDER — LORATADINE 10 MG/1
10 TABLET ORAL DAILY
COMMUNITY

## 2021-11-17 ASSESSMENT — ENCOUNTER SYMPTOMS
DIARRHEA: 0
HEMOPTYSIS: 0
EYE PAIN: 0
WHEEZING: 0
FALLS: 0
SINUS PAIN: 0
CHILLS: 0
STRIDOR: 0
DIAPHORESIS: 0
NECK PAIN: 0
SHORTNESS OF BREATH: 0
TREMORS: 0
FEVER: 0
FOCAL WEAKNESS: 0
CLAUDICATION: 0
ABDOMINAL PAIN: 0
WEAKNESS: 0
EYE REDNESS: 0
DIZZINESS: 0
EYE DISCHARGE: 0
DOUBLE VISION: 0
PHOTOPHOBIA: 0
SORE THROAT: 0
COUGH: 0
BLURRED VISION: 0
HEADACHES: 0
PALPITATIONS: 0
VOMITING: 0
DEPRESSION: 0
MYALGIAS: 0
NAUSEA: 0
PND: 0
SPUTUM PRODUCTION: 0
CONSTIPATION: 0
ORTHOPNEA: 0
HEARTBURN: 0
WEIGHT LOSS: 0
BACK PAIN: 0
SPEECH CHANGE: 0

## 2021-11-17 ASSESSMENT — PULMONARY FUNCTION TESTS
FEV1/FVC_PERCENT_PREDICTED: 88
FEV1/FVC_PERCENT_CHANGE: -5
FVC_LLN: 2.33
FEV1_PERCENT_PREDICTED: 80
FVC: 2.55
FEV1_PREDICTED: 2.14
FEV1_PERCENT_CHANGE: 15
FEV1: 1.58
FVC_PERCENT_PREDICTED: 91
FEV1/FVC_PREDICTED: 77
FEV1/FVC_PERCENT_PREDICTED: 92
FEV1/FVC_PERCENT_PREDICTED: 92
FVC: 2.21
FEV1/FVC: 71
FEV1/FVC: 71
FEV1/FVC_PERCENT_LLN: 65
FEV1: 1.72
FEV1/FVC: 68
FEV1/FVC_PERCENT_CHANGE: 60
FEV1_PERCENT_PREDICTED: 73
FEV1/FVC_PERCENT_PREDICTED: 77
FEV1/FVC_PERCENT_PREDICTED: 87
FVC_PERCENT_PREDICTED: 79
FEV1_LLN: 1.78
FVC_PREDICTED: 2.79
FEV1_PERCENT_CHANGE: 9
FEV1/FVC: 67.45

## 2021-11-17 NOTE — PROGRESS NOTES
Chief Complaint   Patient presents with   • COPD     last seen 5/14/21 Dr. Oliver    • Results     PFT 11/17/21         HPI: This patient is a 75 y.o. female whom is followed in our clinic for COPD with RAD component last seen by Dr. Oliver on 5/14/21. Pt is an active tobacco smoker with >20 pk year hx. She was referred to us for cough and tx initially with spiriva which caused adverse side effects. She has mild airflow obstruction with normal FEV post BD at 80% predicted based on updated PFTs today. Normal TLC with air trapping and preserved DLCO. She uses BReo as needed but has not needed this or her rescue inhaler for several months. She does have environmental allergies with associated sinus congesiton and peripheral eosinophilia in the past. Functionally she is quite well, MMRC 1. CXR from 10/2018 at [place] was clear.     Past Medical History:   Diagnosis Date   • Chest tightness    • Cough    • Shortness of breath    • Sputum production    • Wheezing        Social History     Socioeconomic History   • Marital status: Single     Spouse name: Not on file   • Number of children: Not on file   • Years of education: Not on file   • Highest education level: Not on file   Occupational History   • Not on file   Tobacco Use   • Smoking status: Current Every Day Smoker     Packs/day: 0.25     Years: 32.00     Pack years: 8.00     Types: Cigarettes   • Smokeless tobacco: Never Used   Vaping Use   • Vaping Use: Former   • Passive vaping exposure: Yes   Substance and Sexual Activity   • Alcohol use: Not Currently     Comment: 1/day   • Drug use: No   • Sexual activity: Not on file   Other Topics Concern   • Not on file   Social History Narrative   • Not on file     Social Determinants of Health     Financial Resource Strain:    • Difficulty of Paying Living Expenses: Not on file   Food Insecurity:    • Worried About Running Out of Food in the Last Year: Not on file   • Ran Out of Food in the Last Year: Not on file    Transportation Needs:    • Lack of Transportation (Medical): Not on file   • Lack of Transportation (Non-Medical): Not on file   Physical Activity:    • Days of Exercise per Week: Not on file   • Minutes of Exercise per Session: Not on file   Stress:    • Feeling of Stress : Not on file   Social Connections:    • Frequency of Communication with Friends and Family: Not on file   • Frequency of Social Gatherings with Friends and Family: Not on file   • Attends Taoism Services: Not on file   • Active Member of Clubs or Organizations: Not on file   • Attends Club or Organization Meetings: Not on file   • Marital Status: Not on file   Intimate Partner Violence:    • Fear of Current or Ex-Partner: Not on file   • Emotionally Abused: Not on file   • Physically Abused: Not on file   • Sexually Abused: Not on file   Housing Stability:    • Unable to Pay for Housing in the Last Year: Not on file   • Number of Places Lived in the Last Year: Not on file   • Unstable Housing in the Last Year: Not on file       History reviewed. No pertinent family history.    Current Outpatient Medications on File Prior to Visit   Medication Sig Dispense Refill   • loratadine (CLARITIN) 10 MG Tab Take 10 mg by mouth every day.     • Fluticasone Furoate-Vilanterol (BREO ELLIPTA) 100-25 MCG/INH AEROSOL POWDER, BREATH ACTIVATED Inhale 1 Puff every day. Rinse mouth after use. 1 Each 3   • VENTOLIN  (90 Base) MCG/ACT Aero Soln inhalation aerosol INHALE 2 PUFFS BY MOUTH 4 TIMES DAILY FOR 30 DAYS AS NEEDED FOR WHEEZING 8.5 g 5   • hydrocortisone ([employer] CORTISONE MAXIMUM STRENGTH) 1 % Cream Apply  to affected area(s) 2 times a day.     • ibuprofen (MOTRIN) 200 MG Tab Take 200 mg by mouth every 6 hours as needed.     • ibuprofen (MOTRIN) 100 MG/5ML Suspension Take 10 mg/kg by mouth every 6 hours as needed.       No current facility-administered medications on file prior to visit.       Patient has no known allergies.      ROS:   Review of  "Systems   Constitutional: Negative for chills, diaphoresis, fever, malaise/fatigue and weight loss.   HENT: Positive for congestion. Negative for ear discharge, ear pain, hearing loss, nosebleeds, sinus pain, sore throat and tinnitus.    Eyes: Negative for blurred vision, double vision, photophobia, pain, discharge and redness.   Respiratory: Negative for cough, hemoptysis, sputum production, shortness of breath, wheezing and stridor.    Cardiovascular: Negative for chest pain, palpitations, orthopnea, claudication, leg swelling and PND.   Gastrointestinal: Negative for abdominal pain, constipation, diarrhea, heartburn, nausea and vomiting.   Genitourinary: Negative for dysuria and urgency.   Musculoskeletal: Negative for back pain, falls, joint pain, myalgias and neck pain.   Skin: Negative for itching and rash.   Neurological: Negative for dizziness, tremors, speech change, focal weakness, weakness and headaches.   Endo/Heme/Allergies: Negative for environmental allergies.   Psychiatric/Behavioral: Negative for depression.       /70 (BP Location: Right arm, Patient Position: Sitting, BP Cuff Size: Adult)   Pulse 70   Temp 36.3 °C (97.3 °F) (Temporal)   Resp 16   Ht 1.651 m (5' 5\")   Wt 79.4 kg (175 lb)   SpO2 96%   Physical Exam  Vitals reviewed.   Constitutional:       General: She is not in acute distress.     Appearance: Normal appearance. She is well-developed and normal weight.   HENT:      Head: Normocephalic and atraumatic.      Right Ear: External ear normal.      Left Ear: External ear normal.      Nose: Nose normal. No congestion.      Mouth/Throat:      Mouth: Mucous membranes are moist.      Pharynx: Oropharynx is clear. No oropharyngeal exudate.   Eyes:      General: No scleral icterus.     Extraocular Movements: Extraocular movements intact.      Conjunctiva/sclera: Conjunctivae normal.      Pupils: Pupils are equal, round, and reactive to light.   Neck:      Vascular: No JVD.      " Trachea: No tracheal deviation.   Cardiovascular:      Rate and Rhythm: Normal rate and regular rhythm.      Heart sounds: Normal heart sounds. No murmur heard.  No friction rub. No gallop.    Pulmonary:      Effort: Pulmonary effort is normal. No accessory muscle usage or respiratory distress.      Breath sounds: Normal breath sounds. No wheezing or rales.   Abdominal:      General: There is no distension.      Palpations: Abdomen is soft.      Tenderness: There is no abdominal tenderness.   Musculoskeletal:         General: No tenderness or deformity. Normal range of motion.      Cervical back: Normal range of motion and neck supple.      Right lower leg: No edema.      Left lower leg: No edema.   Lymphadenopathy:      Cervical: No cervical adenopathy.   Skin:     General: Skin is warm and dry.      Findings: No rash.      Nails: There is no clubbing.   Neurological:      Mental Status: She is alert and oriented to person, place, and time.      Cranial Nerves: No cranial nerve deficit.      Gait: Gait normal.   Psychiatric:         Mood and Affect: Mood normal.         Behavior: Behavior normal.         PFTs as reviewed by me personally: as per hPI    Imaging as reviewed by me personally:  As per hPI    Assessment:  1. Need for vaccination  INFLUENZA VACCINE, HIGH DOSE (65+ ONLY)   2. COPD with asthma (HCC)     3. Tobacco use         Plan:  1.  This will be deferred as patient was allowed to leave prior to vaccination  2.  Suspect significant asthma complement given normal PFTs postbronchodilator and peripheral eosinophilia in the past.  Continue albuterol as needed and Breo 100.  Of course she was counseled on tobacco cessation.  3.  Patient was counseled about cessation.  She is actively trying to cut back.  I offered her lung cancer screening which she declined at this point.    Return in about 6 months (around 5/17/2022) for asthma-copd overlap.

## 2021-11-17 NOTE — PROCEDURES
Tech: Herlinda Payton, RRT, CPFT  Tech notes: Good patient effort & cooperation.  FVC induced bronchospasm.  The results of this test meet the ATS/ERS standards for acceptability & reproducibility.  Test was performed on the sfilatino Body Plethysmograph-Elite DX system.  Predicted values were GLI-2012 for spirometry, GLI- 2017 for DLCO, ITS for Lung Volumes.  The DLCO was uncorrected for Hgb.  A bronchodilator of Ventolin HFA -2puffs via spacer administered.  DLCO performed during dilation period.    Interpretation:  Baseline spirometry shows proportionate reduction in FVC at 2.21 L or 79% predicted and FEV1 at 1.58 L or 73% predicted with low normal FEV1/FVC ratio of 71.  There is significant bronchodilator response.  Total lung capacity is within normal limits however there is air trapping at 152% predicted.  Diffusion capacity is preserved at 94% predicted.  Pulmonary function testing shows borderline airflow obstruction with air trapping consistent with chronic obstructive lung disease.  There is bronchodilator responsiveness but not complete reversal of airflow obstruction.  Findings are consistent with early COPD or uncontrolled asthma.

## 2022-05-17 ENCOUNTER — APPOINTMENT (OUTPATIENT)
Dept: SLEEP MEDICINE | Facility: MEDICAL CENTER | Age: 77
End: 2022-05-17

## 2022-07-12 ENCOUNTER — HOSPITAL ENCOUNTER (INPATIENT)
Facility: MEDICAL CENTER | Age: 77
LOS: 4 days | DRG: 202 | End: 2022-07-16
Attending: EMERGENCY MEDICINE | Admitting: STUDENT IN AN ORGANIZED HEALTH CARE EDUCATION/TRAINING PROGRAM
Payer: MEDICARE

## 2022-07-12 ENCOUNTER — APPOINTMENT (OUTPATIENT)
Dept: URGENT CARE | Facility: CLINIC | Age: 77
End: 2022-07-12
Payer: MEDICARE

## 2022-07-12 ENCOUNTER — APPOINTMENT (OUTPATIENT)
Dept: RADIOLOGY | Facility: MEDICAL CENTER | Age: 77
DRG: 202 | End: 2022-07-12
Attending: EMERGENCY MEDICINE
Payer: MEDICARE

## 2022-07-12 ENCOUNTER — APPOINTMENT (OUTPATIENT)
Dept: RADIOLOGY | Facility: MEDICAL CENTER | Age: 77
DRG: 202 | End: 2022-07-12
Payer: MEDICARE

## 2022-07-12 DIAGNOSIS — J45.901 ASTHMA WITH ACUTE EXACERBATION, UNSPECIFIED ASTHMA SEVERITY, UNSPECIFIED WHETHER PERSISTENT: ICD-10-CM

## 2022-07-12 DIAGNOSIS — J96.01 ACUTE RESPIRATORY FAILURE WITH HYPOXIA (HCC): ICD-10-CM

## 2022-07-12 DIAGNOSIS — J45.51 SEVERE PERSISTENT ASTHMA WITH ACUTE EXACERBATION: ICD-10-CM

## 2022-07-12 LAB
ALBUMIN SERPL BCP-MCNC: 4.4 G/DL (ref 3.2–4.9)
ALBUMIN/GLOB SERPL: 1.5 G/DL
ALP SERPL-CCNC: 90 U/L (ref 30–99)
ALT SERPL-CCNC: 19 U/L (ref 2–50)
ANION GAP SERPL CALC-SCNC: 11 MMOL/L (ref 7–16)
AST SERPL-CCNC: 21 U/L (ref 12–45)
B PARAP IS1001 DNA NPH QL NAA+NON-PROBE: NOT DETECTED
B PERT.PT PRMT NPH QL NAA+NON-PROBE: NOT DETECTED
BASOPHILS # BLD AUTO: 1.5 % (ref 0–1.8)
BASOPHILS # BLD: 0.11 K/UL (ref 0–0.12)
BILIRUB SERPL-MCNC: 0.5 MG/DL (ref 0.1–1.5)
BUN SERPL-MCNC: 15 MG/DL (ref 8–22)
C PNEUM DNA NPH QL NAA+NON-PROBE: NOT DETECTED
CALCIUM SERPL-MCNC: 10.1 MG/DL (ref 8.5–10.5)
CHLORIDE SERPL-SCNC: 104 MMOL/L (ref 96–112)
CO2 SERPL-SCNC: 23 MMOL/L (ref 20–33)
CREAT SERPL-MCNC: 0.78 MG/DL (ref 0.5–1.4)
EKG IMPRESSION: NORMAL
EOSINOPHIL # BLD AUTO: 1.48 K/UL (ref 0–0.51)
EOSINOPHIL NFR BLD: 19.9 % (ref 0–6.9)
ERYTHROCYTE [DISTWIDTH] IN BLOOD BY AUTOMATED COUNT: 45 FL (ref 35.9–50)
FLUAV RNA NPH QL NAA+NON-PROBE: NOT DETECTED
FLUBV RNA NPH QL NAA+NON-PROBE: NOT DETECTED
GFR SERPLBLD CREATININE-BSD FMLA CKD-EPI: 78 ML/MIN/1.73 M 2
GLOBULIN SER CALC-MCNC: 2.9 G/DL (ref 1.9–3.5)
GLUCOSE SERPL-MCNC: 98 MG/DL (ref 65–99)
HADV DNA NPH QL NAA+NON-PROBE: NOT DETECTED
HCOV 229E RNA NPH QL NAA+NON-PROBE: NOT DETECTED
HCOV HKU1 RNA NPH QL NAA+NON-PROBE: NOT DETECTED
HCOV NL63 RNA NPH QL NAA+NON-PROBE: NOT DETECTED
HCOV OC43 RNA NPH QL NAA+NON-PROBE: NOT DETECTED
HCT VFR BLD AUTO: 41.3 % (ref 37–47)
HGB BLD-MCNC: 13.8 G/DL (ref 12–16)
HMPV RNA NPH QL NAA+NON-PROBE: NOT DETECTED
HPIV1 RNA NPH QL NAA+NON-PROBE: NOT DETECTED
HPIV2 RNA NPH QL NAA+NON-PROBE: NOT DETECTED
HPIV3 RNA NPH QL NAA+NON-PROBE: NOT DETECTED
HPIV4 RNA NPH QL NAA+NON-PROBE: NOT DETECTED
IMM GRANULOCYTES # BLD AUTO: 0.02 K/UL (ref 0–0.11)
IMM GRANULOCYTES NFR BLD AUTO: 0.3 % (ref 0–0.9)
LYMPHOCYTES # BLD AUTO: 1.9 K/UL (ref 1–4.8)
LYMPHOCYTES NFR BLD: 25.6 % (ref 22–41)
M PNEUMO DNA NPH QL NAA+NON-PROBE: NOT DETECTED
MAGNESIUM SERPL-MCNC: 2.3 MG/DL (ref 1.5–2.5)
MCH RBC QN AUTO: 29.9 PG (ref 27–33)
MCHC RBC AUTO-ENTMCNC: 33.4 G/DL (ref 33.6–35)
MCV RBC AUTO: 89.4 FL (ref 81.4–97.8)
MONOCYTES # BLD AUTO: 0.65 K/UL (ref 0–0.85)
MONOCYTES NFR BLD AUTO: 8.8 % (ref 0–13.4)
NEUTROPHILS # BLD AUTO: 3.26 K/UL (ref 2–7.15)
NEUTROPHILS NFR BLD: 43.9 % (ref 44–72)
NRBC # BLD AUTO: 0 K/UL
NRBC BLD-RTO: 0 /100 WBC
NT-PROBNP SERPL IA-MCNC: 38 PG/ML (ref 0–125)
PLATELET # BLD AUTO: 385 K/UL (ref 164–446)
PMV BLD AUTO: 9.1 FL (ref 9–12.9)
POTASSIUM SERPL-SCNC: 4.5 MMOL/L (ref 3.6–5.5)
PROCALCITONIN SERPL-MCNC: 0.06 NG/ML
PROT SERPL-MCNC: 7.3 G/DL (ref 6–8.2)
RBC # BLD AUTO: 4.62 M/UL (ref 4.2–5.4)
RSV RNA NPH QL NAA+NON-PROBE: NOT DETECTED
RV+EV RNA NPH QL NAA+NON-PROBE: NOT DETECTED
SARS-COV-2 RNA NPH QL NAA+NON-PROBE: NOTDETECTED
SODIUM SERPL-SCNC: 138 MMOL/L (ref 135–145)
WBC # BLD AUTO: 7.4 K/UL (ref 4.8–10.8)

## 2022-07-12 PROCEDURE — 87633 RESP VIRUS 12-25 TARGETS: CPT

## 2022-07-12 PROCEDURE — 93005 ELECTROCARDIOGRAM TRACING: CPT | Performed by: EMERGENCY MEDICINE

## 2022-07-12 PROCEDURE — 80053 COMPREHEN METABOLIC PANEL: CPT

## 2022-07-12 PROCEDURE — 84145 PROCALCITONIN (PCT): CPT

## 2022-07-12 PROCEDURE — 99285 EMERGENCY DEPT VISIT HI MDM: CPT

## 2022-07-12 PROCEDURE — 83880 ASSAY OF NATRIURETIC PEPTIDE: CPT

## 2022-07-12 PROCEDURE — 36415 COLL VENOUS BLD VENIPUNCTURE: CPT

## 2022-07-12 PROCEDURE — 94760 N-INVAS EAR/PLS OXIMETRY 1: CPT

## 2022-07-12 PROCEDURE — 99223 1ST HOSP IP/OBS HIGH 75: CPT | Mod: GC | Performed by: STUDENT IN AN ORGANIZED HEALTH CARE EDUCATION/TRAINING PROGRAM

## 2022-07-12 PROCEDURE — 700111 HCHG RX REV CODE 636 W/ 250 OVERRIDE (IP): Performed by: EMERGENCY MEDICINE

## 2022-07-12 PROCEDURE — 96374 THER/PROPH/DIAG INJ IV PUSH: CPT

## 2022-07-12 PROCEDURE — 87581 M.PNEUMON DNA AMP PROBE: CPT

## 2022-07-12 PROCEDURE — 87486 CHLMYD PNEUM DNA AMP PROBE: CPT

## 2022-07-12 PROCEDURE — 85025 COMPLETE CBC W/AUTO DIFF WBC: CPT

## 2022-07-12 PROCEDURE — 94640 AIRWAY INHALATION TREATMENT: CPT

## 2022-07-12 PROCEDURE — 700111 HCHG RX REV CODE 636 W/ 250 OVERRIDE (IP): Performed by: STUDENT IN AN ORGANIZED HEALTH CARE EDUCATION/TRAINING PROGRAM

## 2022-07-12 PROCEDURE — A9270 NON-COVERED ITEM OR SERVICE: HCPCS | Performed by: STUDENT IN AN ORGANIZED HEALTH CARE EDUCATION/TRAINING PROGRAM

## 2022-07-12 PROCEDURE — 71045 X-RAY EXAM CHEST 1 VIEW: CPT

## 2022-07-12 PROCEDURE — 700101 HCHG RX REV CODE 250: Performed by: STUDENT IN AN ORGANIZED HEALTH CARE EDUCATION/TRAINING PROGRAM

## 2022-07-12 PROCEDURE — 83735 ASSAY OF MAGNESIUM: CPT

## 2022-07-12 PROCEDURE — 87798 DETECT AGENT NOS DNA AMP: CPT | Mod: 91

## 2022-07-12 PROCEDURE — 700102 HCHG RX REV CODE 250 W/ 637 OVERRIDE(OP): Performed by: STUDENT IN AN ORGANIZED HEALTH CARE EDUCATION/TRAINING PROGRAM

## 2022-07-12 PROCEDURE — 770001 HCHG ROOM/CARE - MED/SURG/GYN PRIV*

## 2022-07-12 PROCEDURE — 700101 HCHG RX REV CODE 250: Performed by: EMERGENCY MEDICINE

## 2022-07-12 RX ORDER — PREDNISONE 50 MG/1
50 TABLET ORAL DAILY
Status: COMPLETED | OUTPATIENT
Start: 2022-07-12 | End: 2022-07-15

## 2022-07-12 RX ORDER — VITAMIN B COMPLEX
1000 TABLET ORAL DAILY
COMMUNITY

## 2022-07-12 RX ORDER — MULTIVIT WITH MINERALS/LUTEIN
1000 TABLET ORAL
COMMUNITY

## 2022-07-12 RX ORDER — GUAIFENESIN 600 MG/1
600 TABLET, EXTENDED RELEASE ORAL EVERY 12 HOURS
COMMUNITY
End: 2022-08-03

## 2022-07-12 RX ORDER — ACETAMINOPHEN 325 MG/1
650 TABLET ORAL EVERY 6 HOURS PRN
Status: DISCONTINUED | OUTPATIENT
Start: 2022-07-12 | End: 2022-07-16 | Stop reason: HOSPADM

## 2022-07-12 RX ORDER — IPRATROPIUM BROMIDE AND ALBUTEROL SULFATE 2.5; .5 MG/3ML; MG/3ML
3 SOLUTION RESPIRATORY (INHALATION) 4 TIMES DAILY
Status: ON HOLD | COMMUNITY
Start: 2022-06-19 | End: 2022-07-15 | Stop reason: SDUPTHER

## 2022-07-12 RX ORDER — BISACODYL 10 MG
10 SUPPOSITORY, RECTAL RECTAL
Status: DISCONTINUED | OUTPATIENT
Start: 2022-07-12 | End: 2022-07-16 | Stop reason: HOSPADM

## 2022-07-12 RX ORDER — HYDRALAZINE HYDROCHLORIDE 20 MG/ML
10 INJECTION INTRAMUSCULAR; INTRAVENOUS EVERY 4 HOURS PRN
Status: DISCONTINUED | OUTPATIENT
Start: 2022-07-12 | End: 2022-07-16 | Stop reason: HOSPADM

## 2022-07-12 RX ORDER — FLUTICASONE PROPIONATE 50 MCG
2 SPRAY, SUSPENSION (ML) NASAL DAILY
Status: DISCONTINUED | OUTPATIENT
Start: 2022-07-13 | End: 2022-07-16 | Stop reason: HOSPADM

## 2022-07-12 RX ORDER — AMOXICILLIN 250 MG
2 CAPSULE ORAL 2 TIMES DAILY
Status: DISCONTINUED | OUTPATIENT
Start: 2022-07-12 | End: 2022-07-16 | Stop reason: HOSPADM

## 2022-07-12 RX ORDER — GUAIFENESIN/DEXTROMETHORPHAN 100-10MG/5
10 SYRUP ORAL EVERY 6 HOURS PRN
Status: DISCONTINUED | OUTPATIENT
Start: 2022-07-12 | End: 2022-07-16 | Stop reason: HOSPADM

## 2022-07-12 RX ORDER — IBUPROFEN 200 MG
950 CAPSULE ORAL DAILY
COMMUNITY

## 2022-07-12 RX ORDER — POLYETHYLENE GLYCOL 3350 17 G/17G
1 POWDER, FOR SOLUTION ORAL
Status: DISCONTINUED | OUTPATIENT
Start: 2022-07-12 | End: 2022-07-16 | Stop reason: HOSPADM

## 2022-07-12 RX ORDER — CHOLECALCIFEROL (VITAMIN D3) 125 MCG
1000 CAPSULE ORAL DAILY
Status: DISCONTINUED | OUTPATIENT
Start: 2022-07-13 | End: 2022-07-16 | Stop reason: HOSPADM

## 2022-07-12 RX ORDER — ASCORBIC ACID 500 MG
1000 TABLET ORAL DAILY
Status: DISCONTINUED | OUTPATIENT
Start: 2022-07-13 | End: 2022-07-16 | Stop reason: HOSPADM

## 2022-07-12 RX ORDER — IPRATROPIUM BROMIDE AND ALBUTEROL SULFATE 2.5; .5 MG/3ML; MG/3ML
3 SOLUTION RESPIRATORY (INHALATION)
Status: DISCONTINUED | OUTPATIENT
Start: 2022-07-12 | End: 2022-07-14

## 2022-07-12 RX ORDER — VITAMIN E 268 MG
400 CAPSULE ORAL DAILY
COMMUNITY

## 2022-07-12 RX ORDER — FLUTICASONE PROPIONATE 50 MCG
2 SPRAY, SUSPENSION (ML) NASAL DAILY
COMMUNITY
End: 2023-08-15

## 2022-07-12 RX ORDER — METHYLPREDNISOLONE SODIUM SUCCINATE 125 MG/2ML
125 INJECTION, POWDER, LYOPHILIZED, FOR SOLUTION INTRAMUSCULAR; INTRAVENOUS ONCE
Status: COMPLETED | OUTPATIENT
Start: 2022-07-12 | End: 2022-07-12

## 2022-07-12 RX ORDER — VITAMIN B COMPLEX
1000 TABLET ORAL DAILY
Status: DISCONTINUED | OUTPATIENT
Start: 2022-07-13 | End: 2022-07-16 | Stop reason: HOSPADM

## 2022-07-12 RX ORDER — IBUPROFEN 200 MG
950 CAPSULE ORAL DAILY
Status: DISCONTINUED | OUTPATIENT
Start: 2022-07-13 | End: 2022-07-16 | Stop reason: HOSPADM

## 2022-07-12 RX ORDER — LORATADINE 10 MG/1
10 TABLET ORAL DAILY
Status: DISCONTINUED | OUTPATIENT
Start: 2022-07-13 | End: 2022-07-16 | Stop reason: HOSPADM

## 2022-07-12 RX ORDER — BUDESONIDE AND FORMOTEROL FUMARATE DIHYDRATE 160; 4.5 UG/1; UG/1
2 AEROSOL RESPIRATORY (INHALATION)
Status: DISCONTINUED | OUTPATIENT
Start: 2022-07-13 | End: 2022-07-16 | Stop reason: HOSPADM

## 2022-07-12 RX ADMIN — IPRATROPIUM BROMIDE AND ALBUTEROL SULFATE 3 ML: 2.5; .5 SOLUTION RESPIRATORY (INHALATION) at 23:05

## 2022-07-12 RX ADMIN — SENNOSIDES AND DOCUSATE SODIUM 2 TABLET: 50; 8.6 TABLET ORAL at 22:28

## 2022-07-12 RX ADMIN — RIVAROXABAN 10 MG: 10 TABLET, FILM COATED ORAL at 22:27

## 2022-07-12 RX ADMIN — PREDNISONE 50 MG: 20 TABLET ORAL at 22:26

## 2022-07-12 RX ADMIN — ALBUTEROL SULFATE 10 MG/HR: 5 SOLUTION RESPIRATORY (INHALATION) at 18:48

## 2022-07-12 RX ADMIN — IPRATROPIUM BROMIDE 0.5 MG: 0.5 SOLUTION RESPIRATORY (INHALATION) at 18:47

## 2022-07-12 RX ADMIN — METHYLPREDNISOLONE SODIUM SUCCINATE 125 MG: 125 INJECTION, POWDER, FOR SOLUTION INTRAMUSCULAR; INTRAVENOUS at 18:32

## 2022-07-12 NOTE — ED TRIAGE NOTES
"Ambulates to triage  Chief Complaint   Patient presents with   • Shortness of Breath     \"Asthma attack\" used her inhaler with no relief, placed on 3L and feels better     Pt said she has been having increased SOB and asthma attacks since June, pt ran out of her breo inhaler at the beginning of June, has had insurance issues and has not been able to get into her pulmonologist office.  "

## 2022-07-13 PROBLEM — J96.01 ACUTE RESPIRATORY FAILURE WITH HYPOXIA (HCC): Status: ACTIVE | Noted: 2022-07-13

## 2022-07-13 PROBLEM — F17.200 SMOKING: Status: ACTIVE | Noted: 2022-07-13

## 2022-07-13 LAB
BASOPHILS # BLD AUTO: 0.3 % (ref 0–1.8)
BASOPHILS # BLD: 0.02 K/UL (ref 0–0.12)
CHOLEST SERPL-MCNC: 164 MG/DL (ref 100–199)
EOSINOPHIL # BLD AUTO: 0.01 K/UL (ref 0–0.51)
EOSINOPHIL NFR BLD: 0.1 % (ref 0–6.9)
ERYTHROCYTE [DISTWIDTH] IN BLOOD BY AUTOMATED COUNT: 45.4 FL (ref 35.9–50)
HCT VFR BLD AUTO: 37.7 % (ref 37–47)
HDLC SERPL-MCNC: 38 MG/DL
HGB BLD-MCNC: 12.4 G/DL (ref 12–16)
IMM GRANULOCYTES # BLD AUTO: 0.05 K/UL (ref 0–0.11)
IMM GRANULOCYTES NFR BLD AUTO: 0.7 % (ref 0–0.9)
LDLC SERPL CALC-MCNC: 115 MG/DL
LYMPHOCYTES # BLD AUTO: 0.74 K/UL (ref 1–4.8)
LYMPHOCYTES NFR BLD: 9.7 % (ref 22–41)
MCH RBC QN AUTO: 29.7 PG (ref 27–33)
MCHC RBC AUTO-ENTMCNC: 32.9 G/DL (ref 33.6–35)
MCV RBC AUTO: 90.4 FL (ref 81.4–97.8)
MONOCYTES # BLD AUTO: 0.05 K/UL (ref 0–0.85)
MONOCYTES NFR BLD AUTO: 0.7 % (ref 0–13.4)
NEUTROPHILS # BLD AUTO: 6.72 K/UL (ref 2–7.15)
NEUTROPHILS NFR BLD: 88.5 % (ref 44–72)
NRBC # BLD AUTO: 0 K/UL
NRBC BLD-RTO: 0 /100 WBC
PLATELET # BLD AUTO: 336 K/UL (ref 164–446)
PMV BLD AUTO: 9 FL (ref 9–12.9)
RBC # BLD AUTO: 4.17 M/UL (ref 4.2–5.4)
TRIGL SERPL-MCNC: 57 MG/DL (ref 0–149)
WBC # BLD AUTO: 7.6 K/UL (ref 4.8–10.8)

## 2022-07-13 PROCEDURE — 94760 N-INVAS EAR/PLS OXIMETRY 1: CPT

## 2022-07-13 PROCEDURE — 94640 AIRWAY INHALATION TREATMENT: CPT

## 2022-07-13 PROCEDURE — 80061 LIPID PANEL: CPT

## 2022-07-13 PROCEDURE — 85025 COMPLETE CBC W/AUTO DIFF WBC: CPT

## 2022-07-13 PROCEDURE — 94664 DEMO&/EVAL PT USE INHALER: CPT

## 2022-07-13 PROCEDURE — 700101 HCHG RX REV CODE 250: Performed by: STUDENT IN AN ORGANIZED HEALTH CARE EDUCATION/TRAINING PROGRAM

## 2022-07-13 PROCEDURE — A9270 NON-COVERED ITEM OR SERVICE: HCPCS | Performed by: STUDENT IN AN ORGANIZED HEALTH CARE EDUCATION/TRAINING PROGRAM

## 2022-07-13 PROCEDURE — 700102 HCHG RX REV CODE 250 W/ 637 OVERRIDE(OP): Performed by: STUDENT IN AN ORGANIZED HEALTH CARE EDUCATION/TRAINING PROGRAM

## 2022-07-13 PROCEDURE — 99232 SBSQ HOSP IP/OBS MODERATE 35: CPT | Performed by: INTERNAL MEDICINE

## 2022-07-13 PROCEDURE — 99406 BEHAV CHNG SMOKING 3-10 MIN: CPT

## 2022-07-13 PROCEDURE — 94669 MECHANICAL CHEST WALL OSCILL: CPT

## 2022-07-13 PROCEDURE — 770001 HCHG ROOM/CARE - MED/SURG/GYN PRIV*

## 2022-07-13 RX ADMIN — RIVAROXABAN 10 MG: 10 TABLET, FILM COATED ORAL at 16:57

## 2022-07-13 RX ADMIN — IPRATROPIUM BROMIDE AND ALBUTEROL SULFATE 3 ML: 2.5; .5 SOLUTION RESPIRATORY (INHALATION) at 01:51

## 2022-07-13 RX ADMIN — IPRATROPIUM BROMIDE AND ALBUTEROL SULFATE 3 ML: 2.5; .5 SOLUTION RESPIRATORY (INHALATION) at 06:28

## 2022-07-13 RX ADMIN — IPRATROPIUM BROMIDE AND ALBUTEROL SULFATE 3 ML: 2.5; .5 SOLUTION RESPIRATORY (INHALATION) at 18:54

## 2022-07-13 RX ADMIN — IPRATROPIUM BROMIDE AND ALBUTEROL SULFATE 3 ML: 2.5; .5 SOLUTION RESPIRATORY (INHALATION) at 10:41

## 2022-07-13 RX ADMIN — Medication 1000 UNITS: at 06:06

## 2022-07-13 RX ADMIN — SENNOSIDES AND DOCUSATE SODIUM 2 TABLET: 50; 8.6 TABLET ORAL at 06:15

## 2022-07-13 RX ADMIN — LORATADINE 10 MG: 10 TABLET ORAL at 06:06

## 2022-07-13 RX ADMIN — SENNOSIDES AND DOCUSATE SODIUM 2 TABLET: 50; 8.6 TABLET ORAL at 16:57

## 2022-07-13 RX ADMIN — CYANOCOBALAMIN TAB 500 MCG 1000 MCG: 500 TAB at 06:07

## 2022-07-13 RX ADMIN — OXYCODONE HYDROCHLORIDE AND ACETAMINOPHEN 1000 MG: 500 TABLET ORAL at 06:06

## 2022-07-13 RX ADMIN — GUAIFENESIN AND DEXTROMETHORPHAN 10 ML: 100; 10 SYRUP ORAL at 21:24

## 2022-07-13 RX ADMIN — IPRATROPIUM BROMIDE AND ALBUTEROL SULFATE 3 ML: 2.5; .5 SOLUTION RESPIRATORY (INHALATION) at 23:17

## 2022-07-13 RX ADMIN — THERA TABS 1 TABLET: TAB at 06:08

## 2022-07-13 RX ADMIN — BUDESONIDE AND FORMOTEROL FUMARATE DIHYDRATE 2 PUFF: 160; 4.5 AEROSOL RESPIRATORY (INHALATION) at 18:54

## 2022-07-13 RX ADMIN — Medication 950 MG: at 06:06

## 2022-07-13 ASSESSMENT — ENCOUNTER SYMPTOMS
WHEEZING: 1
SHORTNESS OF BREATH: 1
COUGH: 1
ABDOMINAL PAIN: 0

## 2022-07-13 ASSESSMENT — PATIENT HEALTH QUESTIONNAIRE - PHQ9
SUM OF ALL RESPONSES TO PHQ9 QUESTIONS 1 AND 2: 0
2. FEELING DOWN, DEPRESSED, IRRITABLE, OR HOPELESS: NOT AT ALL
1. LITTLE INTEREST OR PLEASURE IN DOING THINGS: NOT AT ALL

## 2022-07-13 ASSESSMENT — FIBROSIS 4 INDEX: FIB4 SCORE: 1.089724735885168388

## 2022-07-13 ASSESSMENT — COGNITIVE AND FUNCTIONAL STATUS - GENERAL
MOBILITY SCORE: 18
MOVING TO AND FROM BED TO CHAIR: A LITTLE
CLIMB 3 TO 5 STEPS WITH RAILING: A LITTLE
SUGGESTED CMS G CODE MODIFIER MOBILITY: CK
SUGGESTED CMS G CODE MODIFIER DAILY ACTIVITY: CJ
TURNING FROM BACK TO SIDE WHILE IN FLAT BAD: A LITTLE
TOILETING: A LITTLE
STANDING UP FROM CHAIR USING ARMS: A LITTLE
MOVING FROM LYING ON BACK TO SITTING ON SIDE OF FLAT BED: A LITTLE
WALKING IN HOSPITAL ROOM: A LITTLE
HELP NEEDED FOR BATHING: A LITTLE
DRESSING REGULAR LOWER BODY CLOTHING: A LITTLE
DAILY ACTIVITIY SCORE: 21

## 2022-07-13 ASSESSMENT — LIFESTYLE VARIABLES
SUBSTANCE_ABUSE: 0
EVER HAD A DRINK FIRST THING IN THE MORNING TO STEADY YOUR NERVES TO GET RID OF A HANGOVER: NO
HAVE YOU EVER FELT YOU SHOULD CUT DOWN ON YOUR DRINKING: YES
TOTAL SCORE: 1
CONSUMPTION TOTAL: NEGATIVE
HOW MANY TIMES IN THE PAST YEAR HAVE YOU HAD 5 OR MORE DRINKS IN A DAY: 0
ON A TYPICAL DAY WHEN YOU DRINK ALCOHOL HOW MANY DRINKS DO YOU HAVE: 0
AVERAGE NUMBER OF DAYS PER WEEK YOU HAVE A DRINK CONTAINING ALCOHOL: 0
EVER FELT BAD OR GUILTY ABOUT YOUR DRINKING: NO
TOTAL SCORE: 1
ALCOHOL_USE: NO
DOES PATIENT WANT TO STOP DRINKING: NO
TOTAL SCORE: 1
HAVE PEOPLE ANNOYED YOU BY CRITICIZING YOUR DRINKING: NO

## 2022-07-13 NOTE — ED NOTES
Report received from LILIAM Bernal. Pt reports no needs at this time. Updated pt on plan of care. Daughter remains bedside. Call light within reach.

## 2022-07-13 NOTE — CARE PLAN
The patient is Stable - Low risk of patient condition declining or worsening         Progress made toward(s) clinical / shift goals:  yes      Problem: Knowledge Deficit - Standard  Goal: Patient and family/care givers will demonstrate understanding of plan of care, disease process/condition, diagnostic tests and medications  Outcome: Progressing     Problem: Skin Integrity  Goal: Skin integrity is maintained or improved  Outcome: Progressing       Patient is not progressing towards the following goals:

## 2022-07-13 NOTE — ASSESSMENT & PLAN NOTE
"Due to asthma flare  Wean O2 as tolerated, she may need to discharge with oxygen\"    Will need O2 at least around 4-5L; O2 eval but getting O2 might be a challenge; patient willing to stay with daughter in [place]. May offer HHC  Declines HHC. O2 delivery pending. Echo pending.  Follow up with Pulmnology.  "

## 2022-07-13 NOTE — ASSESSMENT & PLAN NOTE
"Cessation counseling\"    I spent 4 minutes, discussing tobacco dependence and cardiac as well as pulmonary risk. Smoking cessation instructions. Code 06483    "

## 2022-07-13 NOTE — ASSESSMENT & PLAN NOTE
Mild acute hypoxic respiratory failure secondary to asthma exacerbation.  Patient requires 2 to 3 L to saturate above 90%, saturating at 84% on room air.  -O2 to saturation above 92%  -See treatment for asthma exacerbation as delineated below.

## 2022-07-13 NOTE — ED NOTES
Pt resting comfortably in hospital bed. No acute distress noted. Call light remains within reach.

## 2022-07-13 NOTE — PROGRESS NOTES
Hospital Medicine Daily Progress Note    Date of Service  7/13/2022    Chief Complaint  Patrica Qiu is a 76 y.o. female admitted 7/12/2022 with SOB    Hospital Course  77 yo woman with asthma who has lost her pulmonologist. She recently had asthma flare and placed on steroids and nebs but continued to worsen. She was admitted for asthma exacerbation. Resp viral panel was negative.    Interval Problem Update  She feels better but still has some SOB and cough  Now on 5L O2. 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 She may stay with her daughter in [place] after discharge    I have discussed this patient's plan of care and discharge plan at IDT rounds today with Case Management, Nursing, Nursing leadership, and other members of the IDT team.    Consultants/Specialty  none    Code Status  Full Code    Disposition  Patient is not medically cleared for discharge.   Anticipate discharge to to home with close outpatient follow-up.  I have placed the appropriate orders for post-discharge needs.    Review of Systems  Review of Systems   Constitutional: Positive for malaise/fatigue.   Respiratory: Positive for cough, shortness of breath and wheezing.    Cardiovascular: Negative for chest pain.   Gastrointestinal: Negative for abdominal pain.   Psychiatric/Behavioral: Negative for substance abuse.   All other systems reviewed and are negative.       Physical Exam  Temp:  [36.1 °C (96.9 °F)-36.3 °C (97.3 °F)] 36.3 °C (97.3 °F)  Pulse:  [] 95  Resp:  [14-26] 20  BP: (111-166)/(55-82) 111/55  SpO2:  [85 %-98 %] 96 %    Physical Exam  Vitals and nursing note reviewed.   Constitutional:       General: She is not in acute distress.     Appearance: She is not toxic-appearing.   HENT:      Head: Normocephalic.      Mouth/Throat:      Mouth: Mucous membranes are moist.    Eyes:      General:         Right eye: No discharge.         Left eye: No discharge.   Cardiovascular:      Rate and Rhythm: Normal rate and regular rhythm.   Pulmonary:      Effort: No respiratory distress.      Breath sounds: Wheezing present. No rales.   Abdominal:      Palpations: Abdomen is soft.      Tenderness: There is no abdominal tenderness.   Musculoskeletal:         General: No swelling.      Cervical back: Neck supple.   Skin:     General: Skin is warm and dry.   Neurological:      Mental Status: She is alert and oriented to person, place, and time.         Fluids    Intake/Output Summary (Last 24 hours) at 7/13/2022 1538  Last data filed at 7/13/2022 0930  Gross per 24 hour   Intake 360 ml   Output --   Net 360 ml       Laboratory  Recent Labs     07/12/22  1647 07/13/22  0240   WBC 7.4 7.6   RBC 4.62 4.17*   HEMOGLOBIN 13.8 12.4   HEMATOCRIT 41.3 37.7   MCV 89.4 90.4   MCH 29.9 29.7   MCHC 33.4* 32.9*   RDW 45.0 45.4   PLATELETCT 385 336   MPV 9.1 9.0     Recent Labs     07/12/22  1647   SODIUM 138   POTASSIUM 4.5   CHLORIDE 104   CO2 23   GLUCOSE 98   BUN 15   CREATININE 0.78   CALCIUM 10.1             Recent Labs     07/13/22  0240   TRIGLYCERIDE 57   HDL 38*   *       Imaging  DX-CHEST-PORTABLE (1 VIEW)   Final Result         No acute cardiac or pulmonary abnormality is identified.           Assessment/Plan  * Acute respiratory failure with hypoxia (HCC)- (present on admission)  Assessment & Plan  Due to asthma flare  Wean O2 as tolerated, she may need to discharge with oxygen    Smoking- (present on admission)  Assessment & Plan  Cessation counseling    Asthma exacerbation attacks- (present on admission)  Assessment & Plan  Continue symbicort, duonebs, prednisone       VTE prophylaxis: Xarelto 10 mg daily as prophylaxis    I have performed a physical exam and reviewed and updated ROS and Plan today (7/13/2022). In review of yesterday's note (7/12/2022), there are no changes except as  documented above.

## 2022-07-13 NOTE — DISCHARGE PLANNING
Care Transition Team Assessment    CM spoke with patient at bedside, assessment complete. [place] with significant other whom she calls her common law . 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 Patient states they have no electricity in their home other than from generator and she does not use it at night to save gas. She is aware she may need home O2 at this point and states she is considering moving to [place] to live with her daughter who is encouraging her to do so. She has walker at home and uses wheelchair when she feels tired and short of breath. CM will continue to follow progress and be available for dc planning needs.     Information Source  Orientation Level: Oriented X4  Information Given By: Patient  Who is responsible for making decisions for patient? : Patient    Elopement Risk  Legal Hold: No  Ambulatory or Self Mobile in Wheelchair: Yes  Disoriented: No    Interdisciplinary Discharge Planning  Does Admitting Nurse Feel This Could be a Complex Discharge?: No  Primary Care Physician: New appointment with Dr. Stanislaw Raza 8/4/22  Lives with - Patient's Self Care Capacity: Significant Other  Patient or legal guardian wants to designate a caregiver: No  Support Systems: Children, Family Member(s)  Housing / Facility: [address]  Do You Take your Prescribed Medications Regularly: Yes  Able to Return to Previous ADL's: Yes  Mobility Issues: No  Prior Services: None  Patient Prefers to be Discharged to:: with daughter in [place]  Assistance Needed: Unknown at this Time  Durable Medical Equipment: Walker    Discharge Preparedness  What is your plan after discharge?: Home with help  What are your discharge supports?: Child  Prior Functional Level: Ambulatory, Drives Self, Independent with Activities of Daily Living, Independent with Medication Management, Uses Walker, Uses Wheelchair  Difficulity with ADLs: Walking  Difficulty with ADLs Comment:  uses walker and wc  Difficulity with IADLs: Cooking, Driving  Difficulity with IADL Comments: due to shortness of breath    Functional Assesment  Prior Functional Level: Ambulatory, Drives Self, Independent with Activities of Daily Living, Independent with Medication Management, Uses Walker, Uses Wheelchair    Finances  Financial Barriers to Discharge: No  Prescription Coverage: Yes    Vision / Hearing Impairment  Vision Impairment : Yes  Right Eye Vision: Wears Glasses  Left Eye Vision: Wears Glasses  Hearing Impairment : No    Values / Beliefs / Concerns  Values / Beliefs Concerns : No    Advance Directive  Advance Directive?: None  Advance Directive offered?: AD Booklet refused    Domestic Abuse  Have you ever been the victim of abuse or violence?: No  Physical Abuse or Sexual Abuse: No  Verbal Abuse or Emotional Abuse: No  Possible Abuse/Neglect Reported to:: Not Applicable    Discharge Risks or Barriers  Discharge risks or barriers?: No PCP, Complex medical needs  Patient risk factors: Complex medical needs, No PCP, Vulnerable adult    Anticipated Discharge Information  Discharge Disposition: Discharged to home/self care (01) (Possible dc to daughter's home)

## 2022-07-13 NOTE — RESPIRATORY CARE
COPD EDUCATION by COPD CLINICAL EDUCATOR  7/13/2022  at  3:55 PM by Lori Maynard RRT     Patient interviewed by COPD education team.  Patient was previously seen by [facility], but they don't accept the patients insurance. PFT on 11/17/2021 indicates early COPD or uncontrolled sever Asthma. Therefore a short intervention has been conducted.  A comprehensive packet including information about COPD, types of treatments to manage their disease and safe home Oxygen usage was provided and reviewed with patient at the bedside.  Patient was given a list of the local pulmonary clinics, with suggestions to seek out appointments with either [facility] or [facility] who both accept the patient insurance. The patient was given a copy of the COPD Action Plan filled out for her, and we discussed alternative respiratory medications since she has complained of side effects with the inhalers she has previously used.     Smoking Cessation Intervention and education completed, 5 minutes spent on smoking cessation education with patient. Patient states she recently quit, I congratulated her, and encouraged continued abstinence.     Patient is not eligible for RPM.     COPD Screen  COPD Risk Screening  Do you have a history of COPD?: Yes  Do you have a Pulmonologist?: Yes (Previously with [facility])    COPD Assessment  COPD Clinical Specialists ONLY  COPD Education Initiated: Yes--Short Intervention  DME Company: [employer]  DME Equipment Type: nebulzier  Physician Name: New patient appointment with PCP in August in [place]  Pulmonologist Name: given list of local pulmonary clinics, suggested  or [facility] due to pt's insurance.  Referrals Initiated: Yes  Pulmonary Rehab: N/A  Smoking Cessation: Yes  $ Smoking Cessation 3-10 Minutes: Asymptomatic (5 min, recently quit)  Hospice: N/A  Home Health Care: N/A  [facility]: N/A  [facility]: N/A  [facility] (lives outside  "of service area)  Private In-Home Care Agency: N/A  Is this a COPD exacerbation patient?: No (Asthma exacerbation)  $ Demo/Eval of SVN's, MDI's and Aerosols: Yes (reviewed meds)  (OP) Pulmonary Function Testing: Yes (11/17/2021: FVC 2.21 L(79%), ratio 71%)    PFT Results    No results found for: PFT    Meds to Beds  Would the patient like to opt in for Bedside Medication Delivery at Discharge?: Yes, interested     MY COPD ACTION PLAN     It is recommended that patients and physicians /healthcare providers complete this action plan together. This plan should be discussed at each physician visit and updated as needed.    The green, yellow and red zones show groups of symptoms of COPD. This list of symptoms is not comprehensive, and you may experience other symptoms. In the \"Actions\" column, your healthcare provider has recommended actions for you to take based on your symptoms.    Patient Name: Patrica Qiu   YOB: 1945   Last Updated on:     Green Zone:  I am doing well today Actions   •  Usual activitiy and exercise level •  Take daily medications   •  Usual amounts of cough and phlegm/mucus •  Use oxygen as prescribed   •  Sleep well at night •  Continue regular exercise/diet plan   •  Appetite is good •  At all times avoid cigarette smoke, inhaled irritants     Daily Medications (these medications are taken every day):   Fluticasone Furoate and Vilanterol trifenatate (Breo) 1 Puff Once daily     Additional Information:  Rinse mouth after taking.     Yellow Zone:  I am having a bad day or a COPD flare Actions   •  More breathless than usual •  Continue daily medications   •  I have less energy for my daily activities •  Use quick relief inhaler as ordered   •  Increased or thicker phlegm/mucus •  Use oxygen as prescribed   •  Using quick relief inhaler/nebulizer more often •  Get plenty of rest   •  Swelling of ankles more than usual •  Use pursed lip breathing   •  More coughing than usual • " " At all times avoid cigarette smoke, inhaled irritants   •  I feel like I have a \"chest cold\"   •  Poor sleep and my symptoms woke me up   •  My appetite is not good   •  My medicine is not helping    •  Call provider immediately if symptoms don’t improve     Continue daily medications, add rescue medications:   Albuterol  Albuterol/Ipratropium (Combivent, Duoneb) 2 Puffs  3mL via nebulizer Every 4 hours PRN         Medications to be used during a flare up, (as Discussed with Provider):           Additional Information:  Use your nebulizer as recommended by your doctor.     Use a spacer with your Albuterol rescue inhaler when your nebulizer is unavailable.     Keep your nebulizer clean to reduce the risk of lung infections.     Red Zone:  I need urgent medical care Actions   •  Severe shortness of breath even at rest •  Call 911 or seek medical care immediately   •  Not able to do any activity because of breathing    •  Fever or shaking chills    •  Feeling confused or very drowsy     •  Chest pains    •  Coughing up blood                "

## 2022-07-13 NOTE — ASSESSMENT & PLAN NOTE
Patient smokes half pack of cigarettes per day for 20+ years.    I discussed cessation with patient including starting on nicotine patch and/or gum on discharge. I also discussed medications to help with cessation with patient including Wellbutrin and Chantix.  Smoking cessation discussed with patient for 4 minutes.

## 2022-07-13 NOTE — ED PROVIDER NOTES
"ED Provider Note    Scribed for Moy Meng M.D. by Madi Woods. 2022  6:03 PM    Primary care provider: Maximus Sifuentes M.D.  Means of arrival: Walk-in  History obtained from: Patient  History limited by: None    CHIEF COMPLAINT  Chief Complaint   Patient presents with    Shortness of Breath     \"Asthma attack\" used her inhaler with no relief, placed on 3L and feels better       HPI  Patrica Qiu is a 76 y.o. female with a history of asthma who presents to the Emergency Department for shortness of breath onset one week ago. Per patient, she went to the ED in [place], [place] in  and was given an Albuterol, Nebulizer, and Steroids. She notes that at this visit, she was advised to take steroids for five days per month. From there, the patient's daughter notes that her respiratory symptoms have been steadily worsening. The patient notes that one of the inhalers she has been using has been  since March and the other she has been using  in May. The patient feels that the Nebulizers she has been using are giving her giving her side effects such as \"coughing.\" Yesterday, the mother mentioned the patient considered going to the ED twice. The patient endorses associated symptoms of leg cramps, \"esophagus cramps,\" and leg swelling but denies fevers or chest pain. The patient notes that her shortness of breath is exacerbated upon laying down. The patient denies being on any supplemental O2 at home. While the patient does not have a history of Congestive Heart Failure, patient notes that her father did.     REVIEW OF SYSTEMS  Pertinent positives include shortness of breath, leg cramps, \"esophagus cramps,\" and leg swelling. Pertinent negatives include no fevers or chest pain.  All other systems reviewed and negative.    PAST MEDICAL HISTORY   has a past medical history of Asthma, Chest tightness, Chronic obstructive pulmonary disease (HCC), Cough, Shortness of breath, Sputum production, and " Wheezing.    SURGICAL HISTORY   has a past surgical history that includes tubal ligation () and dilation and curettage ().    SOCIAL HISTORY  Social History     Tobacco Use    Smoking status: Former Smoker     Packs/day: 0.25     Years: 32.00     Pack years: 8.00     Types: Cigarettes     Quit date: 2022     Years since quittin.0    Smokeless tobacco: Never Used   Vaping Use    Vaping Use: Former    Passive vaping exposure: Yes   Substance Use Topics    Alcohol use: Not Currently     Comment: not for 3 years    Drug use: No      Social History     Substance and Sexual Activity   Drug Use No       FAMILY HISTORY  History reviewed. No pertinent family history.    CURRENT MEDICATIONS  Home Medications       Reviewed by Meli Schwarz (Pharmacy Tech) on 22 at 2115  Med List Status: Complete     Medication Last Dose Status   Ascorbic Acid (VITAMIN C) 1000 MG Tab 2022 Active   calcium citrate (CALCITRATE) 950 (200 Ca) MG Tab 2022 Active   cyanocobalamin (VITAMIN B12) 1000 MCG Tab 2022 Active   fluticasone (FLONASE) 50 MCG/ACT nasal spray 2022 Active   Fluticasone Furoate-Vilanterol (BREO ELLIPTA) 100-25 MCG/INH AEROSOL POWDER, BREATH ACTIVATED 2022 Active   guaiFENesin ER (MUCINEX) 600 MG TABLET SR 12 HR 2022 Active   ipratropium-albuterol (DUONEB) 0.5-2.5 (3) MG/3ML nebulizer solution 2022 Active   loratadine (CLARITIN) 10 MG Tab 2022 Active   multivitamin (THERAGRAN) Tab 2022 Active   NON SPECIFIED 2022 Active   VENTOLIN  (90 Base) MCG/ACT Aero Soln inhalation aerosol 2022 Active   vitamin D3 (CHOLECALCIFEROL) 1000 Unit (25 mcg) Tab 2022 Active   vitamin e (VITAMIN E) 400 UNIT Cap 2022 Active                    ALLERGIES  No Known Allergies    PHYSICAL EXAM  VITAL SIGNS: BP (!) 166/82   Pulse 94   Temp 36.1 °C (96.9 °F) (Temporal)   Resp 14   Wt 80.7 kg (177 lb 14.6 oz)   SpO2 91% Comment: Room air 88  BMI 29.61 kg/m²      Constitutional: Well developed, Well nourished, Mild distress, Non-toxic appearance.   HENT: Normocephalic, Atraumatic, Bilateral external ears normal, Oropharynx moist, No oral exudates.   Eyes: PERRLA, EOMI, Conjunctiva normal, No discharge.   Neck: No tenderness, Supple, No stridor.   Lymphatic: No lymphadenopathy noted.   Cardiovascular: Normal heart rate, Normal rhythm.   Thorax & Lungs: Clear to auscultation bilaterally, Moderate respiratory distress, Diffused inspiratory and expiratory wheezing, No crackles.   Abdomen: Soft, No tenderness, No masses, No pulsatile masses.   Skin: Warm, Dry, No erythema, No rash.   Extremities: 1+ lower extremity edema, No cyanosis.   Musculoskeletal: No tenderness to palpation or major deformities noted.  Intact distal pulses  Neurologic: Awake, alert. Moves all extremities spontaneously.  Psychiatric: Affect normal, Judgment normal, Mood normal.     LABS  Results for orders placed or performed during the hospital encounter of 07/12/22   CBC with Differential   Result Value Ref Range    WBC 7.4 4.8 - 10.8 K/uL    RBC 4.62 4.20 - 5.40 M/uL    Hemoglobin 13.8 12.0 - 16.0 g/dL    Hematocrit 41.3 37.0 - 47.0 %    MCV 89.4 81.4 - 97.8 fL    MCH 29.9 27.0 - 33.0 pg    MCHC 33.4 (L) 33.6 - 35.0 g/dL    RDW 45.0 35.9 - 50.0 fL    Platelet Count 385 164 - 446 K/uL    MPV 9.1 9.0 - 12.9 fL    Neutrophils-Polys 43.90 (L) 44.00 - 72.00 %    Lymphocytes 25.60 22.00 - 41.00 %    Monocytes 8.80 0.00 - 13.40 %    Eosinophils 19.90 (H) 0.00 - 6.90 %    Basophils 1.50 0.00 - 1.80 %    Immature Granulocytes 0.30 0.00 - 0.90 %    Nucleated RBC 0.00 /100 WBC    Neutrophils (Absolute) 3.26 2.00 - 7.15 K/uL    Lymphs (Absolute) 1.90 1.00 - 4.80 K/uL    Monos (Absolute) 0.65 0.00 - 0.85 K/uL    Eos (Absolute) 1.48 (H) 0.00 - 0.51 K/uL    Baso (Absolute) 0.11 0.00 - 0.12 K/uL    Immature Granulocytes (abs) 0.02 0.00 - 0.11 K/uL    NRBC (Absolute) 0.00 K/uL   Comp Metabolic Panel   Result Value Ref  Range    Sodium 138 135 - 145 mmol/L    Potassium 4.5 3.6 - 5.5 mmol/L    Chloride 104 96 - 112 mmol/L    Co2 23 20 - 33 mmol/L    Anion Gap 11.0 7.0 - 16.0    Glucose 98 65 - 99 mg/dL    Bun 15 8 - 22 mg/dL    Creatinine 0.78 0.50 - 1.40 mg/dL    Calcium 10.1 8.5 - 10.5 mg/dL    AST(SGOT) 21 12 - 45 U/L    ALT(SGPT) 19 2 - 50 U/L    Alkaline Phosphatase 90 30 - 99 U/L    Total Bilirubin 0.5 0.1 - 1.5 mg/dL    Albumin 4.4 3.2 - 4.9 g/dL    Total Protein 7.3 6.0 - 8.2 g/dL    Globulin 2.9 1.9 - 3.5 g/dL    A-G Ratio 1.5 g/dL   ESTIMATED GFR   Result Value Ref Range    GFR (CKD-EPI) 78 >60 mL/min/1.73 m 2   proBrain Natriuretic Peptide, NT   Result Value Ref Range    NT-proBNP 38 0 - 125 pg/mL   Respiratory Panel By PCR    Specimen: Nasopharyngeal; Respirate   Result Value Ref Range    Adenovirus, PCR Not Detected     SARS-CoV-2 (COVID-19) RNA by MENA NotDetected     Coronavirus 229E, PCR Not Detected     Coronavirus HKU1, PCR Not Detected     Coronavirus NL63, PCR Not Detected     Coronavirus OC43, PCR Not Detected     Human Metapneumovirus, PCR Not Detected     Rhino/Enterovirus, PCR Not Detected     Influenza A, PCR Not Detected     Influenza B, PCR Not Detected     Parainfluenza 1, PCR Not Detected     Parainfluenza 2, PCR Not Detected     Parainfluenza 3, PCR Not Detected     Parainfluenza 4, PCR Not Detected     RSV (Respiratory Syncytial Virus), PCR Not Detected     Bordetella parapertussis ([id number]), PCR Not Detected     Bordetella pertussis (ptxP), PCR Not Detected     Mycoplasma pneumoniae, PCR Not Detected     Chlamydia pneumoniae, PCR Not Detected    PROCALCITONIN   Result Value Ref Range    Procalcitonin 0.06 <0.25 ng/mL   MAGNESIUM   Result Value Ref Range    Magnesium 2.3 1.5 - 2.5 mg/dL   EKG   Result Value Ref Range    Report       [facility] Emergency Dept.    Test Date:  2022-07-12  Pt Name:    NICOLA CAST           Department: ER  MRN:        [id number]                       Room:  Gender:     Female                       Technician: [id number]  :        1945                   Requested By:ER TRIAGE PROTOCOL  Order #:    [id number]                    Reading MD: AROLDO DIAZ MD    Measurements  Intervals                                Axis  Rate:       89                           P:          77  MI:         180                          QRS:        -13  QRSD:       88                           T:          61  QT:         356  QTc:        434    Interpretive Statements  SINUS RHYTHM  LEFT ATRIAL ABNORMALITY  No previous ECG available for comparison  Electronically Signed On 2022 19:54:57 PDT by AROLDO DIAZ MD          RADIOLOGY  DX-CHEST-PORTABLE (1 VIEW)   Final Result         No acute cardiac or pulmonary abnormality is identified.        The radiologist's interpretation of all radiological studies have been reviewed by me.    EKG  Results for orders placed or performed during the hospital encounter of 22   EKG   Result Value Ref Range    Report       [facility] Emergency Dept.    Test Date:  2022  Pt Name:    NICOLA CAST           Department: ER  MRN:        [id number]                      Room:  Gender:     Female                       Technician: [id number]  :        1945                   Requested By:ER TRIAGE PROTOCOL  Order #:    [id number]                    Reading MD: AROLDO DIAZ MD    Measurements  Intervals                                Axis  Rate:       89                           P:          77  MI:         180                          QRS:        -13  QRSD:       88                           T:          61  QT:         356  QTc:        434    Interpretive Statements  SINUS RHYTHM  LEFT ATRIAL ABNORMALITY  No previous ECG available for comparison  Electronically Signed On 2022 19:54:57 PDT by AROLDO DIAZ MD         COURSE & MEDICAL DECISION MAKING  Pertinent Labs & Imaging studies  reviewed. (See chart for details)    4:39 PM - Ordered CMP, CBC w/ Diff, and CXR to evaluate.     6:03 PM - Patient seen and examined at bedside. I reassured the patient that coughing from using a Nebulizer is a sign of recovery. I informed the patient that I will put her on steroids and put her on albuterol 3 times back to back. I informed the patient she may need to stay in the hospital. Patient verbalizes understanding and agreement to this plan of care. Patient verbalizes understanding and agreement to this plan of care. Patient will be treated with Solumedrol 125 mg via injection, Atrovent 0.5 mg nebulizer, and Proventil 2.5 mg/0.5 mL nebulizer solution. Ordered EKG, CMP, CBC w/ Diff, Estimated GFR, and CXR to evaluate her symptoms. The differential diagnoses include but are not limited to: Asthma exacerbation, CHF, and Pulmonary Edema.    7:56 PM - Patient was reevaluated at bedside. Discussed lab and radiology results with the patient and informed them of plans for hospitalization. The patient had the opportunity to ask any questions. The plan for hospitalization was discussed with the patient given the their current presentation and diagnostic study results. Patient is understanding and agreeable to the plan for hospitalization.     8:04 PM - Paged hospitalist    8:21 PM -  I discussed the patient's case and the above findings with Dr. Valdes (Hospitalist) who agreed to evaluate patient for hospitalization.    CRITICAL CARE  The very real possibility of a deterioration of this patient's condition required the highest level of my preparedness for sudden, emergent intervention.  I provided critical care services, which included medication orders, frequent reevaluations of the patient's condition and response to treatment, ordering and reviewing test results, and discussing the case with various consultants.  The critical care time associated with the care of the patient was 35 minutes. Review chart for  interventions. This time is exclusive of any other billable procedures.     Decision Making:  Patient has severe asthma attack, respiratory distress, hypoxemia.  Give the patient hour-long continuous nebulizer along with Atrovent with moderate improvement however the patient is still hypoxic, 85 percent on room air.  The patient will need to be hospitalized, discussed case with hospitalist for hospitalization.    DISPOSITION:  Patient will be hospitalized by Dr. Valdes in critical condition.      FINAL IMPRESSION  1. Severe persistent asthma with acute exacerbation    2. Acute respiratory failure with hypoxia (HCC)    3. Total Critical Care Time was 35 minutes.      Madi DIANA (Scribeder), am scribing for, and in the presence of, Moy Meng M.D..    Electronically signed by: Madi Woods (Jet), 7/12/2022    Moy DIANA M.D. personally performed the services described in this documentation, as scribed by Madi Woods in my presence, and it is both accurate and complete. C    The note accurately reflects work and decisions made by me.  Moy Meng M.D.  7/12/2022  11:52 PM

## 2022-07-13 NOTE — PROGRESS NOTES
4 Eyes Skin Assessment Completed by LILIAM Alexander and LILIAM Beckwith.    Head WDL  Ears WDL  Nose WDL  Mouth WDL  Neck WDL  Breast/Chest Redness and Rash  Shoulder Blades WDL  Spine WDL  (R) Arm/Elbow/Hand WDL  (L) Arm/Elbow/Hand WDL  Abdomen WDL  Groin WDL  Scrotum/Coccyx/Buttocks Scab  (R) Leg WDL  (L) Leg WDL  (R) Heel/Foot/Toe WDL  (L) Heel/Foot/Toe WDL          Devices In Places Blood Pressure Cuff      Interventions In Place N/A    Possible Skin Injury No    Pictures Uploaded Into Epic Yes  Wound Consult Placed N/A  RN Wound Prevention Protocol Ordered No

## 2022-07-13 NOTE — PROGRESS NOTES
Report received from Sara RICKETTS in   ED. Patient to  Be transported to [facility].

## 2022-07-13 NOTE — H&P
"Date of Admission: 7/13/2022  Admission Status: Inpatient  [facility] Team: ROCIO  Attending: Dr. MEHRAN Gracia  Senior Resident: Dr. JASPAL Valdes MD  Contact Number: [phone]    Chief Complaint:   SOB     History of Present Illness (HPI):   Patrica is a 76 y.o. female with PMHx remarkable for asthma on Breo Ellipta and Ventolin inhalers with not take her medications since June 1, presenting to [facility]ED with worsening shortness of breath for the last month.  At the end of May, Ms. Qiu's pulmonologist's office had called her and explained that they do not take her insurance any longer.  Since then she had ran out of Breo Ellipta on June 1.  Since then she has felt a worsening shortness of breath.  On June 23, she presented to a local ER with increased cough and shortness of breath.  She received DuoNeb's in the ED as well as steroids and was sent home with home nebulizer.  After 7 days of nebulizer 4 times daily, pt reports she felt severe leg cramps, esophageal and \"dementia-like sx\". She reduced her duonebs frequency to twice weekly, but felt  Worsening of her SOB, therefore increased her duoneb frequency back to QID. Her ventolin usage had also increased to q2h. She therefore decided to present to [facility]-ED for further treatment.     Review of Systems:   Patient denies fevers, chills, night sweats, weight loss, weight gain, vision changes, double vision, ear pain, runny nose, sore throat, sinus pain, trouble swallowing, chest pain, palpitation, orthopnea, PND, swelling, , hemoptysis, abdominal pain, nausea, vomiting, diarrhea, constipation, melena, reduction of urinary stream, dysuria, hematuria, muscle pain, joint pain, rash, itching, lymphadenopathy, dizziness, headache, weakness, numbness, polyuria, polydipsia, heat intolerance, cold intolerance, depression, anxiety, suicidal ideation.      Past Medical History:   Past Medical History was reviewed with patient.   has a past medical history of Asthma, Chest " tightness, Chronic obstructive pulmonary disease (HCC), Cough, Shortness of breath, Sputum production, and Wheezing.    She has no past medical history of Difficulty breathing, Fainting, Painful breathing, Palpitations, or Swelling of lower extremity.    Past Surgical History: Past Surgical History was reviewed with patient.   has a past surgical history that includes tubal ligation (1969) and dilation and curettage (1990).    Medications: Medications have been reviewed with patient.  Prior to Admission Medications   Prescriptions Last Dose Informant Patient Reported? Taking?   Ascorbic Acid (VITAMIN C) 1000 MG Tab 7/12/2022 at 0800 Patient Yes Yes   Sig: Take  by mouth.   Fluticasone Furoate-Vilanterol (BREO ELLIPTA) 100-25 MCG/INH AEROSOL POWDER, BREATH ACTIVATED 6/1/2022 at 0900 Patient No No   Sig: Inhale 1 Puff every day. Rinse mouth after use.   VENTOLIN  (90 Base) MCG/ACT Aero Soln inhalation aerosol 7/12/2022 at 1100 Patient No No   Sig: INHALE 2 PUFFS BY MOUTH 4 TIMES DAILY FOR 30 DAYS AS NEEDED FOR WHEEZING   calcium citrate (CALCITRATE) 950 (200 Ca) MG Tab 7/12/2022 at 0800 Patient Yes Yes   Sig: Take 950 mg by mouth every day.   cyanocobalamin (VITAMIN B12) 1000 MCG Tab 7/12/2022 at 0800 Patient Yes Yes   Sig: Take 1,000 mcg by mouth every day.   fluticasone (FLONASE) 50 MCG/ACT nasal spray 7/12/2022 at 0800 Patient Yes Yes   Sig: Administer 2 Sprays into affected nostril(S) every day.   guaiFENesin ER (MUCINEX) 600 MG TABLET SR 12 HR 7/12/2022 at 0800 Patient Yes Yes   Sig: Take 600 mg by mouth every 12 hours.   ipratropium-albuterol (DUONEB) 0.5-2.5 (3) MG/3ML nebulizer solution 7/12/2022 at 1330 Patient Yes No   Sig: Take 3 mL by nebulization 4 times a day.   loratadine (CLARITIN) 10 MG Tab 7/12/2022 at 0900 Patient Yes No   Sig: Take 10 mg by mouth every day.   multivitamin (THERAGRAN) Tab 7/12/2022 at 0800 Patient Yes Yes   Sig: Take 1 Tablet by mouth every day.   vitamin D3 (CHOLECALCIFEROL)  1000 Unit (25 mcg) Tab 7/12/2022 at 0800 Patient Yes Yes   Sig: Take 1,000 Units by mouth every day.   vitamin e (VITAMIN E) 400 UNIT Cap 7/12/2022 at 0800 Patient Yes Yes   Sig: Take 400 Units by mouth every day.      Facility-Administered Medications: None        Allergies: Allergies have been reviewed with patient.  No Known Allergies    Family History:   No history of asthma in the family    Social History:   Home: Lives in [place] with  and 6 cats 2 dogs and goats  Education/occupation:   Activity: Independent  Drugs/tobacco/alcohol: No alcohol use in the past 4 years, previously 2 glasses/day for 20 years.  No recreational drugs.  Smoked half pack per day for 20+ years.  Sex: Not active  Spiritulaity: Not affiliated  Suicidality/homocidality: Denies   recent travel: Denies    Primary Care Provider:  No primary care provider on file.    Physical Exam:     Vitals:  Temp:  [36.1 °C (96.9 °F)] 36.1 °C (96.9 °F)  Pulse:  [] 100  Resp:  [14-26] 15  BP: (113-166)/(55-82) 125/60  SpO2:  [85 %-98 %] 96 %    General: Elderly woman laying in bed in no acute distress  HEENT: NC/AT.  PERRLA, EOMI.  External ear normal.  Nares patent, nonedematous nonerythematous.  Moist mucous membranes. Good dentition.  Trachea midline.   Neck: No JVP.  Carotid bruit could not be appreciated.  Nontender, nonnodular thyroid.  No anterior or posterior cervical, occipital, supraclavicular lymphadenopathy  Cardiovascular: PMI<2 cm at fifth costal space at midclavicular line.  No heaves appreciated.  No thrills.  RRR W/O M, R, G.  Pulmonary: Normal work of breathing.  Resonant to percussion.  Wheezing heard in 10 lung fields  Abdomen: Flat, soft, nondistended.  Normoactive bowel sounds.  Nontender to percussion or palpation.  No hepatosplenomegaly noted.  No fluid wave  Musculoskeletal: Normal tone and bulk.  Full ROM.  Skin: Warm and dry.  No rashes, bruises or lacerations noted  Neuro: Alert and oriented X4.  CN II-XII  checked and intact.  5 out of 5 strength throughout.  DTR 2+ throughout.  FTN, HTS intact.  Sensation intact . negative Romberg.  Lymphatic: No edema or lymphadenopathy noted.  Psychiatric: Good mood congruent affect.  Thought form linear, content appropriate      Labs:   Recent Labs     07/12/22  1647 07/13/22  0240   WBC 7.4 7.6   RBC 4.62 4.17*   HEMOGLOBIN 13.8 12.4   HEMATOCRIT 41.3 37.7   MCV 89.4 90.4   MCH 29.9 29.7   RDW 45.0 45.4   PLATELETCT 385 336   MPV 9.1 9.0   NEUTSPOLYS 43.90* 88.50*   LYMPHOCYTES 25.60 9.70*   MONOCYTES 8.80 0.70   EOSINOPHILS 19.90* 0.10   BASOPHILS 1.50 0.30     Recent Labs     07/12/22  1647   SODIUM 138   POTASSIUM 4.5   CHLORIDE 104   CO2 23   GLUCOSE 98   BUN 15     BNP 38    Imaging:   CXR: No acute cardiopulmonary process ongoing    Previous Data Review: reviewed    Assessment and Plan  Ms. Qiu is a 76-year-old woman with PMH r/f asthma who sought treatment in an outpatient and failed, admitted for acute hypoxic respiratory failure secondary to asthma exacerbation.  I anticipate 2 or more midnights will be required in order to return this patient to his baseline health and arrange for appropriate outpatient follow-up for his myriad of conditions.    * Acute respiratory failure with hypoxia (HCC)- (present on admission)  Assessment & Plan  Mild acute hypoxic respiratory failure secondary to asthma exacerbation.  Patient requires 2 to 3 L to saturate above 90%, saturating at 84% on room air.  -O2 to saturation above 92%  -See treatment for asthma exacerbation as delineated below.    Asthma exacerbation attacks- (present on admission)  Assessment & Plan  FEV1 of 73% predicted with FVC 71% predicted with FEV1 to FVC ratio of 71%, consistent with obstructive process.  Significant respiratory response seen with to bronchodilators, consistent with asthma.  (Per PFT 11/2021)  Exacerbation is due to discontinuation of medical treatment in the setting of loss of follow-up due  to insurance nonacceptance with previously established pulmonologist.  Also contributories ongoing smoking of half a pack per day.  -DuoNebs every 4 hours during waking hours  -RT consult  -Continue Breo Ellipta  -Consider pulmonary consult in the morning  -Prednisone 50 mg daily for total of 5 days  -O2 as per above  -Peak expiratory flow study pending  -Respiratory viral panel pending  -Consider restarting leukotrienes  -Pro-William 2 exclude ongoing bacterial infection, likelihood is low.    Smoking- (present on admission)  Assessment & Plan  Patient smokes half pack of cigarettes per day for 20+ years.    I discussed cessation with patient including starting on nicotine patch and/or gum on discharge. I also discussed medications to help with cessation with patient including Wellbutrin and Chantix.  Smoking cessation discussed with patient for 4 minutes.

## 2022-07-13 NOTE — PROGRESS NOTES
Pt awake and stable. Due meds were given and well tolerated. Pt denies pain, dizziness or SOB and in no acute respiratory distress. Report given to Tammy.

## 2022-07-13 NOTE — ED NOTES
Med rec updated and complete. Allergies reviewed.  Confirmed name and date of birth.  Pt provided an list of medications . Some of the OTC  Medications pt stated that she has not taken in quite sometime.      [facility] pharmacy [facility] [phone] ( [place]).  Pt lives in [place] and has also filled at the [facility]

## 2022-07-13 NOTE — ASSESSMENT & PLAN NOTE
FEV1 of 73% predicted with FVC 71% predicted with FEV1 to FVC ratio of 71%, consistent with obstructive process.  Significant respiratory response seen with to bronchodilators, consistent with asthma.  (Per PFT 11/2021)  Exacerbation is due to discontinuation of medical treatment in the setting of loss of follow-up due to insurance nonacceptance with previously established pulmonologist.  Also contributories ongoing smoking of half a pack per day.  -DuoNebs every 4 hours during waking hours  -RT consult  -Continue Breo Ellipta  -Consider pulmonary consult in the morning  -Prednisone 50 mg daily for total of 5 days  -O2 as per above  -Peak expiratory flow study pending  -Respiratory viral panel pending  -Consider restarting leukotrienes  -Pro-William 2 exclude ongoing bacterial infection, likelihood is low.

## 2022-07-13 NOTE — PROGRESS NOTES
Received report from Liyah- LILIAM. Pt met lying in bed sleeping soundly but easily arousable on taking over the shift. A/O x4, on O2 6L NC, breathing unlabored and pt in no acute respiratory distress. RFA IV access intact. Pt denies pain at this time, call light within reach and safety precautions maintained. V/signs WNL, pt stable.

## 2022-07-13 NOTE — ED NOTES
Pt resting comfortably in hospital bed. No acute distress noted. Call light remains within reach.    DISCHARGE

## 2022-07-13 NOTE — ED NOTES
Pt resting comfortably in hospital bed. Provided with warm blankets. Call light remains within reach.

## 2022-07-14 PROCEDURE — 770001 HCHG ROOM/CARE - MED/SURG/GYN PRIV*

## 2022-07-14 PROCEDURE — 97162 PT EVAL MOD COMPLEX 30 MIN: CPT

## 2022-07-14 PROCEDURE — 94640 AIRWAY INHALATION TREATMENT: CPT

## 2022-07-14 PROCEDURE — 97165 OT EVAL LOW COMPLEX 30 MIN: CPT

## 2022-07-14 PROCEDURE — 700102 HCHG RX REV CODE 250 W/ 637 OVERRIDE(OP): Performed by: STUDENT IN AN ORGANIZED HEALTH CARE EDUCATION/TRAINING PROGRAM

## 2022-07-14 PROCEDURE — 700111 HCHG RX REV CODE 636 W/ 250 OVERRIDE (IP): Performed by: STUDENT IN AN ORGANIZED HEALTH CARE EDUCATION/TRAINING PROGRAM

## 2022-07-14 PROCEDURE — 94760 N-INVAS EAR/PLS OXIMETRY 1: CPT

## 2022-07-14 PROCEDURE — 97535 SELF CARE MNGMENT TRAINING: CPT

## 2022-07-14 PROCEDURE — 99232 SBSQ HOSP IP/OBS MODERATE 35: CPT | Mod: 25 | Performed by: INTERNAL MEDICINE

## 2022-07-14 PROCEDURE — A9270 NON-COVERED ITEM OR SERVICE: HCPCS | Performed by: STUDENT IN AN ORGANIZED HEALTH CARE EDUCATION/TRAINING PROGRAM

## 2022-07-14 PROCEDURE — 700101 HCHG RX REV CODE 250: Performed by: INTERNAL MEDICINE

## 2022-07-14 PROCEDURE — 99406 BEHAV CHNG SMOKING 3-10 MIN: CPT | Performed by: INTERNAL MEDICINE

## 2022-07-14 RX ORDER — IPRATROPIUM BROMIDE AND ALBUTEROL SULFATE 2.5; .5 MG/3ML; MG/3ML
3 SOLUTION RESPIRATORY (INHALATION)
Status: DISCONTINUED | OUTPATIENT
Start: 2022-07-14 | End: 2022-07-15

## 2022-07-14 RX ADMIN — RIVAROXABAN 10 MG: 10 TABLET, FILM COATED ORAL at 16:56

## 2022-07-14 RX ADMIN — PREDNISONE 50 MG: 20 TABLET ORAL at 04:48

## 2022-07-14 RX ADMIN — Medication 950 MG: at 04:48

## 2022-07-14 RX ADMIN — LORATADINE 10 MG: 10 TABLET ORAL at 04:48

## 2022-07-14 RX ADMIN — GUAIFENESIN AND DEXTROMETHORPHAN 10 ML: 100; 10 SYRUP ORAL at 04:47

## 2022-07-14 RX ADMIN — BUDESONIDE AND FORMOTEROL FUMARATE DIHYDRATE 2 PUFF: 160; 4.5 AEROSOL RESPIRATORY (INHALATION) at 19:15

## 2022-07-14 RX ADMIN — SENNOSIDES AND DOCUSATE SODIUM 2 TABLET: 50; 8.6 TABLET ORAL at 04:48

## 2022-07-14 RX ADMIN — IPRATROPIUM BROMIDE AND ALBUTEROL SULFATE 3 ML: 2.5; .5 SOLUTION RESPIRATORY (INHALATION) at 07:58

## 2022-07-14 RX ADMIN — BUDESONIDE AND FORMOTEROL FUMARATE DIHYDRATE 2 PUFF: 160; 4.5 AEROSOL RESPIRATORY (INHALATION) at 07:58

## 2022-07-14 RX ADMIN — OXYCODONE HYDROCHLORIDE AND ACETAMINOPHEN 1000 MG: 500 TABLET ORAL at 04:48

## 2022-07-14 RX ADMIN — THERA TABS 1 TABLET: TAB at 04:48

## 2022-07-14 RX ADMIN — Medication 1000 UNITS: at 04:48

## 2022-07-14 RX ADMIN — CYANOCOBALAMIN TAB 500 MCG 1000 MCG: 500 TAB at 04:48

## 2022-07-14 RX ADMIN — IPRATROPIUM BROMIDE AND ALBUTEROL SULFATE 3 ML: 2.5; .5 SOLUTION RESPIRATORY (INHALATION) at 19:13

## 2022-07-14 RX ADMIN — IPRATROPIUM BROMIDE AND ALBUTEROL SULFATE 3 ML: 2.5; .5 SOLUTION RESPIRATORY (INHALATION) at 14:25

## 2022-07-14 ASSESSMENT — GAIT ASSESSMENTS
DISTANCE (FEET): 50
DEVIATION: SHUFFLED GAIT
GAIT LEVEL OF ASSIST: SUPERVISED

## 2022-07-14 ASSESSMENT — COGNITIVE AND FUNCTIONAL STATUS - GENERAL
DAILY ACTIVITIY SCORE: 23
STANDING UP FROM CHAIR USING ARMS: A LITTLE
CLIMB 3 TO 5 STEPS WITH RAILING: A LITTLE
WALKING IN HOSPITAL ROOM: A LITTLE
SUGGESTED CMS G CODE MODIFIER DAILY ACTIVITY: CI
SUGGESTED CMS G CODE MODIFIER MOBILITY: CJ
HELP NEEDED FOR BATHING: A LITTLE
MOBILITY SCORE: 21

## 2022-07-14 ASSESSMENT — ENCOUNTER SYMPTOMS
WHEEZING: 1
ABDOMINAL PAIN: 0
COUGH: 1
SHORTNESS OF BREATH: 1

## 2022-07-14 ASSESSMENT — LIFESTYLE VARIABLES: SUBSTANCE_ABUSE: 0

## 2022-07-14 ASSESSMENT — ACTIVITIES OF DAILY LIVING (ADL): TOILETING: INDEPENDENT

## 2022-07-14 ASSESSMENT — PAIN DESCRIPTION - PAIN TYPE: TYPE: ACUTE PAIN

## 2022-07-14 NOTE — PROGRESS NOTES
Received bedside report from night shift RN.   Assumed care of patient at change of shift.   Assessment complete and POC discussed.   STATUS: Patient is A&Ox4, VSS, on 5L NC.   PAIN: Patient denies pain, no apparent signs of distress or discomfort.   Patient is sitting up in bed for breakfast.   Bed alarm set, call light in reach.  Bed is in lowest/locked position.   Call light and belongings are within reach.   No further needs at this time.  Will continue to monitor.

## 2022-07-14 NOTE — THERAPY
"Physical Therapy   Initial Evaluation     Patient Name: Patrica Qiu  Age:  76 y.o., Sex:  female  Medical Record #: [id number]  Today's Date: 7/14/2022     Precautions  Precautions: Fall Risk    Assessment  Patient is 76 y.o. female admitted with SOB, admitted for acute respiratory failure with hypoxia 2/2 acute asthma exacerbation. PMHx of COPD, current smoker. [place] generator, reports not compatible with home O2. Pt stating she will d/c to dtr's home who is home and able to assist 24/7 if needed. Pt appears to be at baseline functional mobility, ambulating household distances with no AD or LOB. Pt with mild SOB with ambulation and slight desat to 91% on 5L, returned to 95% with seated rest break of 2 min. Recommend home with HH. Will d/c PT as pt with no further needs.    Edu on pursed lip breathing, self management and compensatory strategies with mobility in the setting of SOB, activity pacing and appropriate progression, monitoring vitals.Edu on pursed lip breathing, self management and compensatory strategies with mobility in the setting of SOB, activity pacing and appropriate progression, monitoring vitals.    Plan    Recommend Physical Therapy for Evaluation only     DC Equipment Recommendations: None  Discharge Recommendations: Recommend home health for continued physical therapy services       Subjective    \"I need to start put my health and needs first.\"      Objective     07/14/22 0825   Vitals   Pulse Oximetry 95 %   O2 (LPM) 5   O2 Delivery Device Silicone Nasal Cannula   Vitals Comments 91% with ambulation and , returned to 95% with HR 86 with seated rest break 2 min   Pain 0 - 10 Group   Therapist Pain Assessment Post Activity Pain Same as Prior to Activity;0;Nurse Notified   Prior Living Situation   Prior Services None   Equipment Owned 4-Wheel Walker;Single Point Cane;Wheelchair   Lives with - Patient's Self Care Capacity Significant Other   Comments " "[place], is primary CG for s.o. Pt reports they will be staying with her Dtr who lives in [place]. 2SH with chair lift to 2nd floor, 3 DANNY with B rails. Dtr able to assist 24/7   Prior Level of Functional Mobility   Bed Mobility Independent   Transfer Status Independent   Ambulation Independent   Distance Ambulation (Feet)   (\"about 50ft\")   Assistive Devices Used None   Wheelchair Independent   Stairs Independent   Comments Pt reports short distances with no AD, otherwise uses w/c or to get around. 4WW sparingly as well   Cognition    Cognition / Consciousness WDL   Level of Consciousness Alert   Comments Pleasant and cooperative   Active ROM Lower Body    Active ROM Lower Body  WDL   Strength Lower Body   Lower Body Strength  WDL   Comments Grossly 4/5 BLE   Sensation Lower Body   Lower Extremity Sensation   X   Comments reports L plantar foot numbness for past month, increases when back pain and IBS flare   Lower Body Muscle Tone   Lower Body Muscle Tone  WDL   Strength Upper Body   Upper Body Strength  WDL   Upper Body Muscle Tone   Upper Body Muscle Tone  WDL   Neurological Concerns   Neurological Concerns Yes   Paresthesia Present   Coordination Upper Body   Coordination WDL   Coordination Lower Body    Coordination Lower Body  WDL   Balance Assessment   Sitting Balance (Static) Good   Sitting Balance (Dynamic) Good   Standing Balance (Static) Fair +   Standing Balance (Dynamic) Fair +   Weight Shift Sitting Good   Weight Shift Standing Fair   Comments no AD. Able to push O2 tank   Gait Analysis   Gait Level Of Assist Supervised   Assistive Device None   Distance (Feet) 50   # of Times Distance was Traveled 1   Deviation Shuffled Gait   # of Stairs Climbed 3   Level of Assist with Stairs Supervised  (with L railing)   Weight Bearing Status no restrictions   Bed Mobility    Comments received/returned EOB. Reports no difficulty getting in/out of bed   Functional Mobility   Sit to Stand " Supervised   Bed, Chair, Wheelchair Transfer Supervised   Toilet Transfers Supervised   Transfer Method Stand Step   Mobility w/ no AD   How much difficulty does the patient currently have...   Turning over in bed (including adjusting bedclothes, sheets and blankets)? 4   Sitting down on and standing up from a chair with arms (e.g., wheelchair, bedside commode, etc.) 4   Moving from lying on back to sitting on the side of the bed? 4   How much help from another person does the patient currently need...   Moving to and from a bed to a chair (including a wheelchair)? 3   Need to walk in a hospital room? 3   Climbing 3-5 steps with a railing? 3   6 clicks Mobility Score 21   Activity Tolerance   Comments limited 2/2 SOB   Education Group   Education Provided Role of Physical Therapist;Gait Training;Stair Training;Use of Assistive Device   Role of Physical Therapist Patient Response Patient;Acceptance;Explanation;Action Demonstration   Gait Training Patient Response Patient;Acceptance;Explanation;Action Demonstration   Stair Training Patient Response Patient;Acceptance;Explanation;Demonstration;Action Demonstration   Use of Assistive Device Patient Response Patient;Acceptance;Explanation;Verbal Demonstration   Additional Comments Edu on pursed lip breathing, self management and compensatory strategies with mobility in the setting of SOB, activity pacing and appropriate progression, monitoring vitals.   Problem List    Problems Decreased Activity Tolerance   Anticipated Discharge Equipment and Recommendations   DC Equipment Recommendations None   Discharge Recommendations Recommend home health for continued physical therapy services   Interdisciplinary Plan of Care Collaboration   IDT Collaboration with  Nursing;Occupational Therapist   Patient Position at End of Therapy Seated;Edge of Bed;Tray Table within Reach;Phone within Reach;Family / Friend in Room   Collaboration Comments RN updated   Session Information   Date /  Session Number  7/14: eval only. D/c PT

## 2022-07-14 NOTE — THERAPY
"Occupational Therapy   Initial Evaluation     Patient Name: Patrica Qiu  Age:  76 y.o., Sex:  female  Medical Record #: [id number]  Today's Date: 7/14/2022    Assessment    Patient is 76 y.o. female admitted with SOB, pmhx asthma with recent flare that has continued to worsen, admitted for asthma exacerbation, currently requiring 5L to maintain 95%. 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 No additional acute OT needs at this time.    Plan    Recommend Occupational Therapy for Evaluation only Patient will not be actively followed for occupational therapy services at this time, however may be seen if requested by physician for 1 more visit within 30 days to address any discharge or equipment needs.     DC Equipment Recommendations: Raised Toilet Seat with Arms, Tub / Shower Seat  Discharge Recommendations: Anticipate that the patient will have no further occupational therapy needs after discharge from the hospital     Subjective    \"I knew I needed to come to the hospital, I was sleeping all the time.\"     Objective       07/14/22 1026   Prior Living Situation   Prior Services None   Housing / Facility 1 Story House   Steps In Home   (has stair chair in dts house)   Bathroom Set up Bathtub / Shower Combination   Equipment Owned 4-Wheel Walker   Lives with - Patient's Self Care Capacity Spouse   Comments [place] without running water, plans to stay with dtr in [place] at her 2SH with a stair lift   Prior Level of ADL Function   Self Feeding Independent   Grooming / Hygiene Independent   Bathing Independent   Dressing Independent   Toileting Independent   Prior Level of IADL Function   Medication Management Independent   Laundry Independent   Kitchen " Mobility Independent   Finances Independent   Home Management Independent   Shopping Independent   Prior Level Of Mobility Independent Without Device in Community   Comments provides care for spouse and manages her property with farm animals   Cognition    Cognition / Consciousness WDL   Comments cooperative, good insight   Balance Assessment   Sitting Balance (Static) Good   Sitting Balance (Dynamic) Good   Standing Balance (Static) Fair +   Standing Balance (Dynamic) Fair +   Weight Shift Sitting Good   Weight Shift Standing Fair   Comments no AD   Bed Mobility    Supine to Sit Supervised   Sit to Supine Supervised   Scooting Supervised   ADL Assessment   Eating Modified Independent   Grooming Modified Independent;Standing   Upper Body Dressing Modified Independent   Lower Body Dressing Modified Independent   Toileting Modified Independent   How much help from another person does the patient currently need...   Putting on and taking off regular lower body clothing? 4   Bathing (including washing, rinsing, and drying)? 3   Toileting, which includes using a toilet, bedpan, or urinal? 4   Putting on and taking off regular upper body clothing? 4   Taking care of personal grooming such as brushing teeth? 4   Eating meals? 4   6 Clicks Daily Activity Score 23   Functional Mobility   Sit to Stand Supervised   Bed, Chair, Wheelchair Transfer Supervised   Toilet Transfers Supervised   Transfer Method Stand Step   Mobility w/AD   Edema / Skin Assessment   Edema / Skin  WDL   Activity Tolerance   Sitting in Chair functional   Sitting Edge of Bed functional   Standing functional   Education Group   Education Provided Role of Occupational Therapist;Activities of Daily Living;Home Safety   Role of Occupational Therapist Patient Response Patient;Acceptance;Explanation;Verbal Demonstration   Home Safety Patient Response Patient;Acceptance;Explanation;Verbal Demonstration   ADL Patient Response  "Patient;Acceptance;Explanation;Verbal Demonstration;Action Demonstration   Problem List   Problem List Decreased Active Daily Living Skills;Decreased Homemaking Skills  (unable to maintain her IADL responsibilities [place]the ranch\")   Anticipated Discharge Equipment and Recommendations   DC Equipment Recommendations Raised Toilet Seat with Arms;Tub / Shower Seat   Discharge Recommendations Anticipate that the patient will have no further occupational therapy needs after discharge from the hospital             "

## 2022-07-14 NOTE — CARE PLAN
The patient is Stable - Low risk of patient condition declining or worsening    Shift Goals  Clinical Goals: tolerate O2  Patient Goals: get up to commode  Family Goals: n/a    Progress made toward(s) clinical / shift goals:    Problem: Knowledge Deficit - Standard  Goal: Patient and family/care givers will demonstrate understanding of plan of care, disease process/condition, diagnostic tests and medications  Outcome: Progressing     Problem: Skin Integrity  Goal: Skin integrity is maintained or improved  Outcome: Progressing       Patient is not progressing towards the following goals:

## 2022-07-14 NOTE — PROGRESS NOTES
Hospital Medicine Daily Progress Note    Date of Service  7/14/2022    Chief Complaint  Patrica Qiu is a 76 y.o. female admitted 7/12/2022 with SOB    Hospital Course  77 yo woman with asthma who has lost her pulmonologist. She recently had asthma flare and placed on steroids and nebs but continued to worsen. She was admitted for asthma exacerbation. Resp viral panel was negative.    She feels better but still has some SOB and cough  Now on 5L O2. 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 She may stay with her daughter in [place] after discharge    Interval Problem Update  7/14. Still needing 5L+ O2 at rest. She seems anxious. Ordered echo and  D-dimer    I have discussed this patient's plan of care and discharge plan at IDT rounds today with Case Management, Nursing, Nursing leadership, and other members of the IDT team.    Consultants/Specialty  none    Code Status  Full Code    Disposition  Patient is not medically cleared for discharge.   Anticipate discharge to to home with close outpatient follow-up.  I have placed the appropriate orders for post-discharge needs.    Review of Systems  Review of Systems   Constitutional: Positive for malaise/fatigue.   Respiratory: Positive for cough, shortness of breath and wheezing.    Cardiovascular: Negative for chest pain.   Gastrointestinal: Negative for abdominal pain.   Psychiatric/Behavioral: Negative for substance abuse.   All other systems reviewed and are negative.       Physical Exam  Temp:  [36.1 °C (97 °F)-37 °C (98.6 °F)] 36.1 °C (97 °F)  Pulse:  [] 63  Resp:  [16-25] 16  BP: (113-134)/(58-73) 129/73  SpO2:  [95 %-99 %] 95 %    Physical Exam  Vitals and nursing note reviewed.   Constitutional:       General: She is not in acute distress.     Appearance: She is not toxic-appearing.   HENT:      Head:  "Normocephalic.      Mouth/Throat:      Mouth: Mucous membranes are moist.   Eyes:      General:         Right eye: No discharge.         Left eye: No discharge.   Cardiovascular:      Rate and Rhythm: Normal rate and regular rhythm.   Pulmonary:      Effort: No respiratory distress.      Breath sounds: Wheezing present. No rales.   Abdominal:      Palpations: Abdomen is soft.      Tenderness: There is no abdominal tenderness.   Musculoskeletal:         General: No swelling.      Cervical back: Neck supple.   Skin:     General: Skin is warm and dry.   Neurological:      Mental Status: She is alert and oriented to person, place, and time.   Psychiatric:      Comments: Anxious         Fluids    Intake/Output Summary (Last 24 hours) at 7/14/2022 0937  Last data filed at 7/13/2022 2250  Gross per 24 hour   Intake 360 ml   Output 2250 ml   Net -1890 ml       Laboratory  Recent Labs     07/12/22  1647 07/13/22  0240   WBC 7.4 7.6   RBC 4.62 4.17*   HEMOGLOBIN 13.8 12.4   HEMATOCRIT 41.3 37.7   MCV 89.4 90.4   MCH 29.9 29.7   MCHC 33.4* 32.9*   RDW 45.0 45.4   PLATELETCT 385 336   MPV 9.1 9.0     Recent Labs     07/12/22  1647   SODIUM 138   POTASSIUM 4.5   CHLORIDE 104   CO2 23   GLUCOSE 98   BUN 15   CREATININE 0.78   CALCIUM 10.1             Recent Labs     07/13/22  0240   TRIGLYCERIDE 57   HDL 38*   *       Imaging  DX-CHEST-PORTABLE (1 VIEW)   Final Result         No acute cardiac or pulmonary abnormality is identified.           Assessment/Plan  * Acute respiratory failure with hypoxia, asthma exacerbation (HCC)- (present on admission)  Assessment & Plan  Due to asthma flare  Wean O2 as tolerated, she may need to discharge with oxygen\"    Will need O2 at least around 4-5L; O2 eval but getting O2 might be a challenge; patient willing to stay with daughter in [place]. May offer [facility]  Follow up with Pulmnology.    Smoking- (present on admission)  Assessment & Plan  Cessation counseling\"    I spent 4 minutes, " discussing tobacco dependence and cardiac as well as pulmonary risk. Smoking cessation instructions. Code 84591      Asthma exacerbation attacks- (present on admission)  Assessment & Plan  Continue symbicort, duonebs, prednisone       VTE prophylaxis: Xarelto 10 mg daily as prophylaxis    I have performed a physical exam and reviewed and updated ROS and Plan today (7/14/2022). In review of yesterday's note (7/13/2022), there are no changes except as documented above.

## 2022-07-14 NOTE — CARE PLAN
The patient is Stable - Low risk of patient condition declining or worsening    Shift Goals  Clinical Goals: tolerate O2  Patient Goals: get up to commode  Family Goals: n/a    Progress made toward(s) clinical / shift goals:  yes      Problem: Knowledge Deficit - Standard  Goal: Patient and family/care givers will demonstrate understanding of plan of care, disease process/condition, diagnostic tests and medications  Outcome: Progressing     Problem: Skin Integrity  Goal: Skin integrity is maintained or improved  Outcome: Progressing       Patient is not progressing towards the following goals:

## 2022-07-15 ENCOUNTER — APPOINTMENT (OUTPATIENT)
Dept: RADIOLOGY | Facility: MEDICAL CENTER | Age: 77
DRG: 202 | End: 2022-07-15
Attending: INTERNAL MEDICINE
Payer: MEDICARE

## 2022-07-15 LAB — D DIMER PPP IA.FEU-MCNC: 0.45 UG/ML (FEU) (ref 0–0.5)

## 2022-07-15 PROCEDURE — A9270 NON-COVERED ITEM OR SERVICE: HCPCS | Performed by: STUDENT IN AN ORGANIZED HEALTH CARE EDUCATION/TRAINING PROGRAM

## 2022-07-15 PROCEDURE — 94640 AIRWAY INHALATION TREATMENT: CPT

## 2022-07-15 PROCEDURE — 94669 MECHANICAL CHEST WALL OSCILL: CPT

## 2022-07-15 PROCEDURE — 99231 SBSQ HOSP IP/OBS SF/LOW 25: CPT | Performed by: INTERNAL MEDICINE

## 2022-07-15 PROCEDURE — 36415 COLL VENOUS BLD VENIPUNCTURE: CPT

## 2022-07-15 PROCEDURE — 700102 HCHG RX REV CODE 250 W/ 637 OVERRIDE(OP): Performed by: STUDENT IN AN ORGANIZED HEALTH CARE EDUCATION/TRAINING PROGRAM

## 2022-07-15 PROCEDURE — 71250 CT THORAX DX C-: CPT | Mod: ME

## 2022-07-15 PROCEDURE — 85379 FIBRIN DEGRADATION QUANT: CPT

## 2022-07-15 PROCEDURE — 700111 HCHG RX REV CODE 636 W/ 250 OVERRIDE (IP): Performed by: STUDENT IN AN ORGANIZED HEALTH CARE EDUCATION/TRAINING PROGRAM

## 2022-07-15 PROCEDURE — 700101 HCHG RX REV CODE 250: Performed by: INTERNAL MEDICINE

## 2022-07-15 PROCEDURE — 770001 HCHG ROOM/CARE - MED/SURG/GYN PRIV*

## 2022-07-15 RX ORDER — IPRATROPIUM BROMIDE AND ALBUTEROL SULFATE 2.5; .5 MG/3ML; MG/3ML
3 SOLUTION RESPIRATORY (INHALATION)
Status: DISCONTINUED | OUTPATIENT
Start: 2022-07-15 | End: 2022-07-16 | Stop reason: HOSPADM

## 2022-07-15 RX ORDER — IPRATROPIUM BROMIDE AND ALBUTEROL SULFATE 2.5; .5 MG/3ML; MG/3ML
3 SOLUTION RESPIRATORY (INHALATION) 4 TIMES DAILY
Qty: 1 EACH | Refills: 3 | Status: SHIPPED | OUTPATIENT
Start: 2022-07-15 | End: 2022-08-03 | Stop reason: SDUPTHER

## 2022-07-15 RX ORDER — ACETAMINOPHEN 325 MG/1
650 TABLET ORAL EVERY 6 HOURS PRN
Qty: 30 TABLET | Refills: 0 | COMMUNITY
Start: 2022-07-15 | End: 2022-08-03

## 2022-07-15 RX ORDER — ALBUTEROL SULFATE 90 UG/1
AEROSOL, METERED RESPIRATORY (INHALATION)
Qty: 8.5 G | Refills: 5 | Status: SHIPPED | OUTPATIENT
Start: 2022-07-15 | End: 2022-08-17

## 2022-07-15 RX ADMIN — RIVAROXABAN 10 MG: 10 TABLET, FILM COATED ORAL at 18:18

## 2022-07-15 RX ADMIN — Medication 1000 UNITS: at 05:00

## 2022-07-15 RX ADMIN — BUDESONIDE AND FORMOTEROL FUMARATE DIHYDRATE 2 PUFF: 160; 4.5 AEROSOL RESPIRATORY (INHALATION) at 10:35

## 2022-07-15 RX ADMIN — SENNOSIDES AND DOCUSATE SODIUM 1 TABLET: 50; 8.6 TABLET ORAL at 05:00

## 2022-07-15 RX ADMIN — BUDESONIDE AND FORMOTEROL FUMARATE DIHYDRATE 2 PUFF: 160; 4.5 AEROSOL RESPIRATORY (INHALATION) at 19:55

## 2022-07-15 RX ADMIN — SENNOSIDES AND DOCUSATE SODIUM 2 TABLET: 50; 8.6 TABLET ORAL at 18:18

## 2022-07-15 RX ADMIN — OXYCODONE HYDROCHLORIDE AND ACETAMINOPHEN 1000 MG: 500 TABLET ORAL at 04:57

## 2022-07-15 RX ADMIN — LORATADINE 10 MG: 10 TABLET ORAL at 04:59

## 2022-07-15 RX ADMIN — CYANOCOBALAMIN TAB 500 MCG 1000 MCG: 500 TAB at 04:58

## 2022-07-15 RX ADMIN — GUAIFENESIN AND DEXTROMETHORPHAN 10 ML: 100; 10 SYRUP ORAL at 04:58

## 2022-07-15 RX ADMIN — PREDNISONE 50 MG: 20 TABLET ORAL at 04:59

## 2022-07-15 RX ADMIN — GUAIFENESIN AND DEXTROMETHORPHAN 10 ML: 100; 10 SYRUP ORAL at 23:48

## 2022-07-15 RX ADMIN — IPRATROPIUM BROMIDE AND ALBUTEROL SULFATE 3 ML: 2.5; .5 SOLUTION RESPIRATORY (INHALATION) at 10:36

## 2022-07-15 RX ADMIN — FLUTICASONE PROPIONATE 100 MCG: 50 SPRAY, METERED NASAL at 04:58

## 2022-07-15 RX ADMIN — Medication 950 MG: at 04:58

## 2022-07-15 RX ADMIN — THERA TABS 1 TABLET: TAB at 04:59

## 2022-07-15 ASSESSMENT — PAIN DESCRIPTION - PAIN TYPE: TYPE: ACUTE PAIN

## 2022-07-15 ASSESSMENT — ENCOUNTER SYMPTOMS
SHORTNESS OF BREATH: 1
WHEEZING: 1
COUGH: 1
ABDOMINAL PAIN: 0

## 2022-07-15 ASSESSMENT — LIFESTYLE VARIABLES: SUBSTANCE_ABUSE: 0

## 2022-07-15 NOTE — DISCHARGE PLANNING
Received Choice form at 1515  Agency/Facility Name: [facility]  Referral sent per Choice form @ 1525     1527:  Agency/Facility Name: [facility]  Spoke To: Fab  Outcome: DPA was informed [facility] does not accept Pt payor source.     RN CM notified    1530:  Received Choice form at 1515  Agency/Facility Name: [facility]  Referral sent per Choice form @ 1540     1535:  Agency/Facility Name: [facility]  Spoke To: Harish  Outcome: DPA was notified DME accepts Pts payor source and they can deliver both concentrator and tank to the Pt room. DPA left contact information for Harish in case oxygen order needs to be altered.     1610:  Agency/Facility Name: [facility]  Spoke To: Harish  Outcome: DPA notified DME length on DME order has now been changed to 99 months and DPA faxed over new order.

## 2022-07-15 NOTE — DISCHARGE PLANNING
Case Management Discharge Planning    Admission Date: 7/12/2022  GMLOS: 3  ALOS: 3    6-Clicks ADL Score: 23  6-Clicks Mobility Score: 21      Anticipated Discharge Dispo: Discharge Disposition: Discharged to home/self care (01) (Possible dc to daughter's home)    DME Needed: Yes    DME Ordered: Yes    Action(s) Taken: Updated Provider/Nurse on Discharge Plan    Escalations Completed: None    Medically Clear: Yes    Next Steps: Patient discussed in IDT rounds. Ambulatory O2 eval determined patient will need continuous home O2 support. CM spoke with patient at bedside to obtain choice for DME and faxed to DPA to start referral. CM spoke with patient's daughter by phone who states she wants her mother to discharge to her home and will live there permanently in order to have constant support. Patient is in agreement with this dc plan. MD made aware by voalte.    Barriers to Discharge: DME    Is the patient up for discharge tomorrow: No, possible dc today.

## 2022-07-15 NOTE — PROGRESS NOTES
Received report and assumed care at shift change. A&O x 4 , cooperative with cares. Continue on 5L oxygen per nasal cannula. No complain of pain or distress. Fall precautions in place, bed locked and in lowest position. Needs attended to.

## 2022-07-15 NOTE — PROGRESS NOTES
Hospital Medicine Daily Progress Note    Date of Service  7/15/2022    Chief Complaint  Patrica Qiu is a 76 y.o. female admitted 7/12/2022 with SOB    Hospital Course  75 yo woman with asthma who has lost her pulmonologist. She recently had asthma flare and placed on steroids and nebs but continued to worsen. She was admitted for asthma exacerbation. Resp viral panel was negative.    She feels better but still has some SOB and cough  Now on 5L O2. 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 She may stay with her daughter in [place] after discharge    Interval Problem Update  7/14. Still needing 5L+ O2 at rest. She seems anxious. Ordered echo and  D-dimer  7/15. Ddimer came back NEGATIVE/NORMAL range. CT Chest no significant infiltrate or opacity. Wean down O2 as she is 100% on 5L. Later O2 was ordered however echo still pending.     I have discussed this patient's plan of care and discharge plan at IDT rounds today with Case Management, Nursing, Nursing leadership, and other members of the IDT team.    Consultants/Specialty  none    Code Status  Full Code    Disposition  Patient is not medically cleared for discharge.   Anticipate discharge to to home with close outpatient follow-up.  I have placed the appropriate orders for post-discharge needs.    Review of Systems  Review of Systems   Constitutional: Positive for malaise/fatigue.   Respiratory: Positive for cough, shortness of breath and wheezing.    Cardiovascular: Negative for chest pain.   Gastrointestinal: Negative for abdominal pain.   Psychiatric/Behavioral: Negative for substance abuse.   All other systems reviewed and are negative.       Physical Exam  Temp:  [36.1 °C (97 °F)-36.5 °C (97.7 °F)] 36.2 °C (97.1 °F)  Pulse:  [71-93] 79  Resp:  [17-18] 17  BP: (112-123)/(59-66) 112/59  SpO2:  [94 %-97 %]  96 %    Physical Exam  Vitals and nursing note reviewed.   Constitutional:       General: She is not in acute distress.     Appearance: She is not toxic-appearing.   HENT:      Head: Normocephalic.      Mouth/Throat:      Mouth: Mucous membranes are moist.   Eyes:      General:         Right eye: No discharge.         Left eye: No discharge.   Cardiovascular:      Rate and Rhythm: Normal rate and regular rhythm.   Pulmonary:      Effort: No respiratory distress.      Breath sounds: Wheezing present. No rales.   Abdominal:      Palpations: Abdomen is soft.      Tenderness: There is no abdominal tenderness.   Musculoskeletal:         General: No swelling.      Cervical back: Neck supple.   Skin:     General: Skin is warm and dry.   Neurological:      Mental Status: She is alert and oriented to person, place, and time.   Psychiatric:      Comments: Cooperative         Fluids    Intake/Output Summary (Last 24 hours) at 7/15/2022 0936  Last data filed at 7/15/2022 0239  Gross per 24 hour   Intake 1280 ml   Output --   Net 1280 ml       Laboratory  Recent Labs     07/12/22  1647 07/13/22  0240   WBC 7.4 7.6   RBC 4.62 4.17*   HEMOGLOBIN 13.8 12.4   HEMATOCRIT 41.3 37.7   MCV 89.4 90.4   MCH 29.9 29.7   MCHC 33.4* 32.9*   RDW 45.0 45.4   PLATELETCT 385 336   MPV 9.1 9.0     Recent Labs     07/12/22  1647   SODIUM 138   POTASSIUM 4.5   CHLORIDE 104   CO2 23   GLUCOSE 98   BUN 15   CREATININE 0.78   CALCIUM 10.1             Recent Labs     07/13/22  0240   TRIGLYCERIDE 57   HDL 38*   *       Imaging  DX-CHEST-PORTABLE (1 VIEW)   Final Result         No acute cardiac or pulmonary abnormality is identified.      EC-ECHOCARDIOGRAM COMPLETE W/O CONT    (Results Pending)   CT-CTA CHEST PULMONARY ARTERY W/ RECONS    (Results Pending)        Assessment/Plan  * Acute respiratory failure with hypoxia, asthma exacerbation (HCC)- (present on admission)  Assessment & Plan  Due to asthma flare  Wean O2 as tolerated, she may need  "to discharge with oxygen\"    Will need O2 at least around 4-5L; O2 eval but getting O2 might be a challenge; patient willing to stay with daughter in [place]. May offer HHC  Declines HHC. O2 delivery pending. Echo pending.  Follow up with Pulmnology.    Smoking- (present on admission)  Assessment & Plan  Cessation counseling\"    I spent 4 minutes, discussing tobacco dependence and cardiac as well as pulmonary risk. Smoking cessation instructions. Code 87592      Asthma exacerbation attacks- (present on admission)  Assessment & Plan  Continue symbicort, duonebs, prednisone       VTE prophylaxis: Xarelto 10 mg daily as prophylaxis    I have performed a physical exam and reviewed and updated ROS and Plan today (7/15/2022). In review of yesterday's note (7/14/2022), there are no changes except as documented above.      "

## 2022-07-15 NOTE — CARE PLAN
The patient is Stable - Low risk of patient condition declining or worsening    Shift Goals  Clinical Goals: wean off O2  Patient Goals: get up to commode  Family Goals: n/a    Progress made toward(s) clinical / shift goals:  patient continue on 5L oxygen per nasal cannula, tolerating well. Continue to wean off of oxygen.     Patient is not progressing towards the following goals:

## 2022-07-15 NOTE — FACE TO FACE
Face to Face Note  -  Durable Medical Equipment    Avery Bryant M.D. - NPI: [id number]  I certify that this patient is under my care and that they had a durable medical equipment(DME)face to face encounter by myself that meets the physician DME face-to-face encounter requirements with this patient on:    Date of encounter:   Patient:                    MRN:                       YOB: 2022  Patrica Qiu  [id number]  1945     The encounter with the patient was in whole, or in part, for the following medical condition, which is the primary reason for durable medical equipment:  Asthma    I certify that, based on my findings, the following durable medical equipment is medically necessary:  Oxygen.    HOME O2 Saturation Measurements:(Values must be present for Home Oxygen orders)  Room air sat at rest: 88  Room air sat with amb: 86  With liters of O2: 3, O2 sat at rest with O2: 94  With Liters of O2: 3, O2 sat with amb with O2 : 90  Is the patient mobile?: Yes    My Clinical findings support the need for the above equipment due to:  Hypoxia    Supporting Symptoms: as above    If patient feels more short of breath, they can go up to 6 liters per minute and contact healthcare provider.

## 2022-07-15 NOTE — DISCHARGE SUMMARY
"Discharge Summary    CHIEF COMPLAINT ON ADMISSION  Chief Complaint   Patient presents with   • Shortness of Breath     \"Asthma attack\" used her inhaler with no relief, placed on 3L and feels better       Reason for Admission  Sent by      Admission Date  7/12/2022    CODE STATUS  Full Code    HPI & HOSPITAL COURSE  This is a 76 y.o. female here with Shortness of Breath (\"Asthma attack\" used her inhaler with no relief, placed on 3L and feels better)  Please review Dr. Jamin Carter M.D. notes for further details of history of present illness, past medical/social/family histories, allergies and medications.   She is overweight. She has a history of asthma but was lost to follow up from her pulmonologist andwas not able to take her inhalers due to financial/insurance issues. She presented with worsening shortness of breath for the past month.  At the ED, she is afebrile, normotensive.  CXR looks clear.  She was put on steroids and O2.   Procalcitonin normal  Ddimer NORMAL range  Echo normal EF, only mild PI and MR.  CT Chest showed no obvious lung abnormality  We were able to wean her down to 3L and she will be on 3L. O2 provided for her. She can establish to see a pulmonologist in 1 week.  SHe is advised not to smoke. At discharge date, Patrica Qiu afebrile and hemodynamically stable. She ws advised NOT to smoke.  Patrica Qiu wanted to be discharged today.    Discharge Physical Exam  Vitals and nursing note reviewed.   Constitutional:       General: She is not in acute distress.     Appearance: She is not toxic-appearing.   HENT:      Head: Normocephalic.      Mouth/Throat:      Mouth: Mucous membranes are moist.   Eyes:      General:         Right eye: No discharge.         Left eye: No discharge.   Cardiovascular:      Rate and Rhythm: Normal rate and regular rhythm.   Pulmonary:      Effort: No respiratory distress.      Breath sounds: Wheezing improved. No rales.   Abdominal:      " Palpations: Abdomen is soft.      Tenderness: There is no abdominal tenderness.   Musculoskeletal:         General: No swelling.      Cervical back: Neck supple.   Skin:     General: Skin is warm and dry.   Neurological:      Mental Status: She is alert and oriented to person, place, and time.   Psychiatric:      Comments: Anxious     Imaging  CT-CHEST (THORAX) W/O   Final Result      1.  Diffuse coronary artery calcification      2.  No evidence of pneumonic infiltrate or lung nodularity.      3.  Fleischner Society pulmonary nodule recommendations:   Not Applicable         DX-CHEST-PORTABLE (1 VIEW)   Final Result         No acute cardiac or pulmonary abnormality is identified.      EC-ECHOCARDIOGRAM COMPLETE W/O CONT    (Results Pending)           Therefore, she is discharged in fair and stable condition to home with close outpatient follow-up.    The patient met 2-midnight criteria for an inpatient stay at the time of discharge.    Discharge Date  7/16/2022    FOLLOW UP ITEMS POST DISCHARGE      DISCHARGE DIAGNOSES  Principal Problem:    Acute respiratory failure with hypoxia, asthma exacerbation (HCC) POA: Yes  Active Problems:    Asthma exacerbation attacks POA: Yes    Smoking POA: Yes        FOLLOW UP  No future appointments.  No follow-up provider specified.  Assign and follow up with primary provider or discharge clinic physician in 1  weeks  Establish with pulmonology in 1 week for asthma and hypoxia  Instructions for obesity/overweight:  Recommended weight loss advised, 5% through reduced calorie, low carb diet and 150 mins of exercise a week once better  Recommend bariatric surgery evaluation if morbidly obese  Educated on the increase of morbidity and mortality associated with excess weight including DM, Heart Disease, HTN, stroke, and sleep apnea. Recommended outpatient monitoring  of blood sugars, lipid panel, sleep study evaluation for metabolic syndrome.    I reviewed the echo. Normal EF (55%). Mild  pulmonic insufficiency and trace mitral regurgitation. Recommend risk factor modification with your primary care doctor; weight loss, healthy diet, exercise, montior your blood pressures, check lipids at that visit. NO MORE SMOKING especially since you are on O2.      Return to ER in the event of new or worsening symptoms. Please note importance of compliance and the patient has agreed to proceed with all medical recommendations and follow up plan indicated above. All medications come with benefits and risks. Risks may include permanent injury or death and these risks can be minimized with close reassessment and monitoring. Please make it to your scheduled follow ups with No primary care provider on file., and/or specialists clinic.    MEDICATIONS ON DISCHARGE     Medication List      START taking these medications      Instructions   acetaminophen 325 MG Tabs  Commonly known as: Tylenol   Take 2 Tablets by mouth every 6 hours as needed.  Dose: 650 mg     guaiFENesin dextromethorphan 100-10 MG/5ML Syrp syrup  Commonly known as: ROBITUSSIN DM   Take 10 mL by mouth every 6 hours as needed for Cough.  Dose: 10 mL        CONTINUE taking these medications      Instructions   calcium citrate 950 (200 Ca) MG Tabs  Commonly known as: CALCITRATE   Take 950 mg by mouth every day.  Dose: 950 mg     cyanocobalamin 1000 MCG Tabs  Commonly known as: VITAMIN B12   Take 1,000 mcg by mouth every day.  Dose: 1,000 mcg     fluticasone 50 MCG/ACT nasal spray  Commonly known as: FLONASE   Administer 2 Sprays into affected nostril(S) every day.  Dose: 2 Spray     Fluticasone Furoate-Vilanterol 100-25 MCG/INH Aepb  Commonly known as: Breo Ellipta   Inhale 1 Puff every day. Rinse mouth after use.  Dose: 1 Puff     guaiFENesin  MG Tb12  Commonly known as: MUCINEX   Take 600 mg by mouth every 12 hours.  Dose: 600 mg     ipratropium-albuterol 0.5-2.5 (3) MG/3ML nebulizer solution  Commonly known as: DUONEB   Take 3 mL by nebulization 4  times a day.  Dose: 3 mL     loratadine 10 MG Tabs  Commonly known as: CLARITIN   Take 10 mg by mouth every day.  Dose: 10 mg     multivitamin Tabs   Take 1 Tablet by mouth every day.  Dose: 1 Tablet     Ventolin  (90 Base) MCG/ACT Aers inhalation aerosol  Generic drug: albuterol   INHALE 2 PUFFS BY MOUTH 4 TIMES DAILY FOR 30 DAYS AS NEEDED FOR WHEEZING     Vitamin C 1000 MG Tabs   Take  by mouth.     vitamin D3 1000 Unit (25 mcg) Tabs  Commonly known as: cholecalciferol   Take 1,000 Units by mouth every day.  Dose: 1,000 Units     vitamin e 400 UNIT Caps  Commonly known as: VITAMIN E   Take 400 Units by mouth every day.  Dose: 400 Units            Allergies  No Known Allergies    DIET  Orders Placed This Encounter   Procedures   • Diet Order Diet: 2 Gram Sodium (GLUTEN FREE)     Standing Status:   Standing     Number of Occurrences:   1     Order Specific Question:   Diet:     Answer:   2 Gram Sodium [7]     Comments:   GLUTEN FREE       ACTIVITY  Avoid heavy lifting or strenuous activity    CONSULTATIONS      PROCEDURES  CT-CHEST (THORAX) W/O    Result Date: 7/15/2022  7/15/2022 1:26 PM HISTORY/REASON FOR EXAM:  PE suspected, low prob, unknown D-dimer; clear CXR, continues to have 5LO2 need, echocardiogram pending. TECHNIQUE/EXAM DESCRIPTION: CT scan of the chest without contrast. Noncontrast helical scanning of the chest was obtained from the lung apices through the adrenal glands. Low dose optimization technique was utilized for this CT exam including automated exposure control and adjustment of the mA and/or kV according to patient size. COMPARISON: None. FINDINGS: Lungs: No nodules or airspace process. Mediastinum/Nora: No significant adenopathy. Pleura: No pleural effusion. Cardiac: Heart normal in size without pericardial effusion. There is diffuse coronary artery calcification. Vascular: Unremarkable. Soft tissues: Unremarkable. Bones: No acute or destructive process.     1.  Diffuse coronary artery  calcification 2.  No evidence of pneumonic infiltrate or lung nodularity. 3.  Fleischner Society pulmonary nodule recommendations: Not Applicable     DX-CHEST-PORTABLE (1 VIEW)    Result Date: 2022 6:10 PM HISTORY/REASON FOR EXAM:  Shortness of Breath TECHNIQUE/EXAM DESCRIPTION AND NUMBER OF VIEWS: Single portable view of the chest. COMPARISON: 2022 FINDINGS: Heart size is within normal limits. No focal infiltrates or consolidations are identified in the lungs. No pleural fluid collections are identified. No pneumothorax is appreciated.     No acute cardiac or pulmonary abnormality is identified.    EC-ECHOCARDIOGRAM COMPLETE W/O CONT    Result Date: 2022  Transthoracic Echo Report Echocardiography Laboratory CONCLUSIONS Normal left ventricular size, thickness, and systolic function. The left ventricular ejection fraction is visually estimated to be 55%. Trace mitral regurgitation. Mild pulmonic insufficiency. NICOLA CAST Exam Date:         2022                    09:33 Exam Location:     Inpatient Priority:          Routine Ordering Physician:        SPENSER FARRIS Referring Physician:       [id number] De Jesus Sonographer:               David Alegria HAWA Age:    76     Gender:    F MRN:    [id number] :    1945 BSA:    1.9    Ht (in):    66     Wt (lb):    177 Exam Type:     Complete Indications:     Shortness of breath ICD Codes:       R06.02 CPT Codes:       94853 BP:   112    /   60     HR:   72 Technical Quality:       Technically difficult study -                          adequate information is obtained MEASUREMENTS  (Male / Female) Normal Values 2D ECHO LV Diastolic Diameter PLAX        4.8 cm                4.2 - 5.9 / 3.9 - 5.3 cm LV Systolic Diameter PLAX         3.5 cm                2.1 - 4.0 cm LV Fractional Shortening PLAX     26.5 %                25 - 46  % IVS Diastolic Thickness           1.1 cm                LVPW Diastolic Thickness          1.1 cm                 Estimated LV Ejection Fraction    55 %                  M-MODE Aortic Root Diameter MM           3.1 cm                DOPPLER AV Peak Velocity                  1.2 m/s               AV Peak Gradient                  5.6 mmHg              AV Mean Gradient                  2.9 mmHg              LVOT Peak Velocity                0.87 m/s              Mitral E Point Velocity           0.66 m/s              Mitral E to A Ratio               0.8                   Mitral A Duration                 149 ms                MV Pressure Half Time             58.3 ms               MV Area PHT                       3.8 cm2               MV Deceleration Time              201 ms                * Indicates values subject to auto-interpretation LV EF:  55    % FINDINGS Left Ventricle Normal left ventricular size, thickness, and systolic function. The left ventricular ejection fraction is visually estimated to be 55%. Right Ventricle Normal right ventricular size and systolic function. Right Atrium The right atrium is not well visualized. Left Atrium Normal left atrial size. Mitral Valve Mild mitral annular calcification. No mitral stenosis. Trace mitral regurgitation. Aortic Valve Structurally normal aortic valve without significant stenosis. Tricuspid Valve Structurally normal tricuspid valve without significant stenosis or regurgitation. Pulmonic Valve Structurally normal pulmonic valve. No pulmonic stenosis. Mild pulmonic insufficiency. Pericardium Normal pericardium without effusion. Aorta Normal aortic root for body surface area. Curtis Hartley M.D. (Electronically Signed) Final Date:     16 July 2022                 13:55      LABORATORY  Lab Results   Component Value Date    SODIUM 138 07/12/2022    POTASSIUM 4.5 07/12/2022    CHLORIDE 104 07/12/2022    CO2 23 07/12/2022    GLUCOSE 98 07/12/2022    BUN 15 07/12/2022    CREATININE 0.78 07/12/2022        Lab Results   Component Value Date    WBC 7.6 07/13/2022     HEMOGLOBIN 12.4 07/13/2022    HEMATOCRIT 37.7 07/13/2022    PLATELETCT 336 07/13/2022        Total time of the discharge process exceeds 35 minutes.

## 2022-07-16 ENCOUNTER — APPOINTMENT (OUTPATIENT)
Dept: CARDIOLOGY | Facility: MEDICAL CENTER | Age: 77
DRG: 202 | End: 2022-07-16
Attending: INTERNAL MEDICINE
Payer: MEDICARE

## 2022-07-16 VITALS
TEMPERATURE: 97.2 F | RESPIRATION RATE: 18 BRPM | SYSTOLIC BLOOD PRESSURE: 106 MMHG | OXYGEN SATURATION: 90 % | HEART RATE: 69 BPM | DIASTOLIC BLOOD PRESSURE: 57 MMHG | WEIGHT: 177.91 LBS | BODY MASS INDEX: 29.61 KG/M2

## 2022-07-16 LAB — LV EJECT FRACT  99904: 55

## 2022-07-16 PROCEDURE — 93306 TTE W/DOPPLER COMPLETE: CPT | Mod: 26 | Performed by: INTERNAL MEDICINE

## 2022-07-16 PROCEDURE — 99239 HOSP IP/OBS DSCHRG MGMT >30: CPT | Performed by: INTERNAL MEDICINE

## 2022-07-16 PROCEDURE — A9270 NON-COVERED ITEM OR SERVICE: HCPCS | Performed by: STUDENT IN AN ORGANIZED HEALTH CARE EDUCATION/TRAINING PROGRAM

## 2022-07-16 PROCEDURE — 700102 HCHG RX REV CODE 250 W/ 637 OVERRIDE(OP): Performed by: STUDENT IN AN ORGANIZED HEALTH CARE EDUCATION/TRAINING PROGRAM

## 2022-07-16 PROCEDURE — 93306 TTE W/DOPPLER COMPLETE: CPT

## 2022-07-16 RX ORDER — GUAIFENESIN/DEXTROMETHORPHAN 100-10MG/5
10 SYRUP ORAL EVERY 6 HOURS PRN
Qty: 840 ML | COMMUNITY
Start: 2022-07-16 | End: 2023-02-09

## 2022-07-16 RX ADMIN — CYANOCOBALAMIN TAB 500 MCG 1000 MCG: 500 TAB at 05:48

## 2022-07-16 RX ADMIN — FLUTICASONE PROPIONATE 100 MCG: 50 SPRAY, METERED NASAL at 05:47

## 2022-07-16 RX ADMIN — Medication 950 MG: at 05:48

## 2022-07-16 RX ADMIN — THERA TABS 1 TABLET: TAB at 05:48

## 2022-07-16 RX ADMIN — SENNOSIDES AND DOCUSATE SODIUM 2 TABLET: 50; 8.6 TABLET ORAL at 05:48

## 2022-07-16 RX ADMIN — OXYCODONE HYDROCHLORIDE AND ACETAMINOPHEN 1000 MG: 500 TABLET ORAL at 05:48

## 2022-07-16 RX ADMIN — BUDESONIDE AND FORMOTEROL FUMARATE DIHYDRATE 2 PUFF: 160; 4.5 AEROSOL RESPIRATORY (INHALATION) at 08:20

## 2022-07-16 RX ADMIN — LORATADINE 10 MG: 10 TABLET ORAL at 05:48

## 2022-07-16 RX ADMIN — Medication 1000 UNITS: at 05:47

## 2022-07-16 NOTE — DISCHARGE PLANNING
Case Management Discharge Planning    Admission Date: 7/12/2022  GMLOS: 3  ALOS: 4    6-Clicks ADL Score: 23  6-Clicks Mobility Score: 21      Anticipated Discharge Dispo: Discharge Disposition: Discharged to home/self care (01) (Possible dc to daughter's home)    DME Needed: Yes    DME Ordered: Yes    Action(s) Taken: PC to [employer]. Spoke with customer service and was informed that Jose will be calling me back.    6067 - PC to [employer] again. Spoke with an  and let them know that I did not get a call back. Spoke with Jose who states that he is not aware of this order. He called his supervisor Nilson, but no answer from Nilson. I let Jose know that we spoke with Harish yesterday and provided all the details. He states he will call Harish and will let me know the outcome.     8642 - PC from [employer] who states that O2 has been delivered to pt's room last night and pt signed for it. They delivered portable O2 and concentrator.     No other DC needs identified at this time.     Escalations Completed: DME Company    Medically Clear: Yes    Next Steps: f/u with DME company on O2 delivery    Barriers to Discharge: Oxygen Delivery    Is the patient up for discharge tomorrow:

## 2022-07-16 NOTE — PROGRESS NOTES
Assumed care of patient this shift. Patient is alert and oriented x 4. Able to make her needs known. Denied complaints of pain this shift when asked. Up with assist in room and to bathroom, patient short of breath with exertion. O2 sats remain stable on 3L oxygen. Fall prevention tactics in place, bed locked and in lowest position and bed alarm on for safety.

## 2022-07-16 NOTE — CARE PLAN
The patient is Stable - Low risk of patient condition declining or worsening    Shift Goals  Clinical Goals: Oxygen saturation will remain stable    Progress made toward(s) clinical / shift goals:   Patient's oxygen saturation has remained stable on 3L Oxygen via nasal cannula. Home oxygen was delivered last evening to bedside.     Problem: Knowledge Deficit - Standard  Goal: Patient and family/care givers will demonstrate understanding of plan of care, disease process/condition, diagnostic tests and medications  Outcome: Progressing     Problem: Skin Integrity  Goal: Skin integrity is maintained or improved  Outcome: Progressing     Problem: Respiratory  Goal: Patient will achieve/maintain optimum respiratory ventilation and gas exchange  Outcome: Progressing

## 2022-07-16 NOTE — CARE PLAN
The patient is Stable - Low risk of patient condition declining or worsening    Shift Goals  Clinical Goals: DC am after echo, manage o2  Patient Goals: go home  Family Goals: n/a    Progress made toward(s) clinical / shift goals:    Problem: Knowledge Deficit - Standard  Goal: Patient and family/care givers will demonstrate understanding of plan of care, disease process/condition, diagnostic tests and medications  Outcome: Progressing     Problem: Skin Integrity  Goal: Skin integrity is maintained or improved  Outcome: Progressing     Problem: Respiratory  Goal: Patient will achieve/maintain optimum respiratory ventilation and gas exchange  Outcome: Progressing     Pt updated on plan of care. Pt encouraged to ask questions and questions were answered. Call light within reach. Pt reminded to use call light whenever needing assistance.   Pt to DC home in am after echo, maintaining in mid 90s for O2 sats on 3L O2 via NC. Medicated for coughing spell, will continue to monitor coughing and medicate and assess as needed.

## 2022-07-16 NOTE — DISCHARGE INSTRUCTIONS
Discharge Instructions    Discharged to home with daughter by car with relative. Discharged via wheelchair, hospital escort: Yes.  Special equipment needed: Oxygen    Be sure to schedule a follow-up appointment with your primary care doctor or any specialists as instructed.     Discharge Plan:        I understand that a diet low in cholesterol, fat, and sodium is recommended for good health. Unless I have been given specific instructions below for another diet, I accept this instruction as my diet prescription.   Other diet: Low Sodium, Gluten Free     Special Instructions: None    -Is this patient being discharged with medication to prevent blood clots?  No    Is patient discharged on Warfarin / Coumadin?   No     Discharge Instructions per Avery Bryant M.D.  DIET: Resume previous diet  Healthy diet. Plenty of vegetables.  ACTIVITY: Avoid strenuous activity or heavy lifting.   DIAGNOSIS: Principal Problem:    Acute respiratory failure with hypoxia, asthma exacerbation (HCC) POA: Yes  Active Problems:    Asthma exacerbation attacks POA: Yes    Smoking POA: Yes    Follow up instructions.  Please call [phone] to schedule and/or confirm appointments([phone] for medical group, [phone] for imaging, [phone] for specialty); we will do our part to try calling as well.  Assign and follow up with primary provider or discharge clinic physician in 1  weeks  Establish with pulmonology in 1 week for asthma and hypoxia  Instructions for obesity/overweight:  Recommended weight loss advised, 5% through reduced calorie, low carb diet and 150 mins of exercise a week once better  Recommend bariatric surgery evaluation if morbidly obese  Educated on the increase of morbidity and mortality associated with excess weight including DM, Heart Disease, HTN, stroke, and sleep apnea. Recommended outpatient monitoring  of blood sugars, lipid panel, sleep study evaluation for metabolic syndrome.    I reviewed the echo. Normal EF (55%). Mild pulmonic  insufficiency and trace mitral regurgitation. Recommend risk factor modification with your primary care doctor; weight loss, healthy diet, exercise, montior your blood pressures, check lipids at that visit. NO MORE SMOKING especially since you are on O2.      Return to ER in the event of new or worsening symptoms. Please note importance of compliance and the patient has agreed to proceed with all medical recommendations and follow up plan indicated above. All medications come with benefits and risks. Risks may include permanent injury or death and these risks can be minimized with close reassessment and monitoring. Please make it to your scheduled follow ups with No primary care provider on file., and/or specialists clinic.

## 2022-07-16 NOTE — PROGRESS NOTES
Assumed care of patient at bedside report from day RN. Updated on POC. Patient currently A & O x 4; on 3 L O2 via nc; up stand by assist  Without any complaints of acute pain. Assessment completed. Call light within reach. Whiteboard updated. Fall precautions in place. Bed locked and in lowest position. All questions answered. No other needs indicated at this time.

## 2022-07-16 NOTE — CARE PLAN
The patient is Stable - Low risk of patient condition declining or worsening    Shift Goals  Clinical Goals: Wean Oxygen      Progress made toward(s) clinical / shift goals:    Patient's oxygen weaned from 5L to 3L. Home oxygen delivered to bedside in anticipation of discharge       Problem: Knowledge Deficit - Standard  Goal: Patient and family/care givers will demonstrate understanding of plan of care, disease process/condition, diagnostic tests and medications  Outcome: Progressing     Problem: Skin Integrity  Goal: Skin integrity is maintained or improved  Outcome: Progressing     Problem: Respiratory  Goal: Patient will achieve/maintain optimum respiratory ventilation and gas exchange  Outcome: Progressing

## 2022-08-03 ENCOUNTER — OFFICE VISIT (OUTPATIENT)
Dept: MEDICAL GROUP | Facility: PHYSICIAN GROUP | Age: 77
End: 2022-08-03
Payer: MEDICARE

## 2022-08-03 ENCOUNTER — PATIENT OUTREACH (OUTPATIENT)
Dept: HEALTH INFORMATION MANAGEMENT | Facility: OTHER | Age: 77
End: 2022-08-03

## 2022-08-03 DIAGNOSIS — J44.1 CHRONIC OBSTRUCTIVE PULMONARY DISEASE WITH ACUTE EXACERBATION (HCC): ICD-10-CM

## 2022-08-03 DIAGNOSIS — J45.901 ASTHMA WITH ACUTE EXACERBATION, UNSPECIFIED ASTHMA SEVERITY, UNSPECIFIED WHETHER PERSISTENT: ICD-10-CM

## 2022-08-03 DIAGNOSIS — J44.9 CHRONIC OBSTRUCTIVE PULMONARY DISEASE, UNSPECIFIED COPD TYPE (HCC): ICD-10-CM

## 2022-08-03 DIAGNOSIS — Z87.891 HX OF SMOKING: ICD-10-CM

## 2022-08-03 DIAGNOSIS — Z00.00 HEALTHCARE MAINTENANCE: ICD-10-CM

## 2022-08-03 DIAGNOSIS — Z23 NEED FOR VACCINATION: ICD-10-CM

## 2022-08-03 DIAGNOSIS — Z71.89: ICD-10-CM

## 2022-08-03 DIAGNOSIS — F41.9 ANXIETY: ICD-10-CM

## 2022-08-03 DIAGNOSIS — R73.09 ELEVATED GLUCOSE: ICD-10-CM

## 2022-08-03 PROCEDURE — 99204 OFFICE O/P NEW MOD 45 MIN: CPT | Mod: 25 | Performed by: FAMILY MEDICINE

## 2022-08-03 PROCEDURE — 90471 IMMUNIZATION ADMIN: CPT | Performed by: FAMILY MEDICINE

## 2022-08-03 PROCEDURE — 90677 PCV20 VACCINE IM: CPT | Performed by: FAMILY MEDICINE

## 2022-08-03 PROCEDURE — 90715 TDAP VACCINE 7 YRS/> IM: CPT | Performed by: FAMILY MEDICINE

## 2022-08-03 PROCEDURE — 90472 IMMUNIZATION ADMIN EACH ADD: CPT | Performed by: FAMILY MEDICINE

## 2022-08-03 RX ORDER — BENZONATATE 100 MG/1
CAPSULE ORAL
COMMUNITY
Start: 2022-06-19 | End: 2022-08-17

## 2022-08-03 RX ORDER — IPRATROPIUM BROMIDE AND ALBUTEROL SULFATE 2.5; .5 MG/3ML; MG/3ML
3 SOLUTION RESPIRATORY (INHALATION) 4 TIMES DAILY
Qty: 1 EACH | Refills: 3 | Status: SHIPPED | OUTPATIENT
Start: 2022-08-03 | End: 2023-02-09

## 2022-08-03 RX ORDER — METHYLPREDNISOLONE 4 MG/1
TABLET ORAL
COMMUNITY
Start: 2022-06-19 | End: 2022-08-03

## 2022-08-03 ASSESSMENT — FIBROSIS 4 INDEX: FIB4 SCORE: 1.089724735885168388

## 2022-08-03 NOTE — ASSESSMENT & PLAN NOTE
Chronic, states that she has anxiety related to her COPD and feeling like she cannot breathe typically.  States that she does do breathing exercises (Niko newsome) to assist with this.

## 2022-08-03 NOTE — PROGRESS NOTES
Subjective:     CC:   Chief Complaint   Patient presents with    Establish Care     New Pt     Oxygen Dependency     Portable concentrator A plus        HISTORY OF THE PRESENT ILLNESS: Patient is a 76 y.o. female. This pleasant patient is here today to establish care and discuss her current health conditions, and needing portable concentrator. Her prior PCP was Dr. Sifuentes  She is currently living with her daughter, states that she [place].  She lives with a long-term partner who she has lived with for the last 32 years, he is currently in town staying with her and her daughter for the next week as he also requires care throughout the day.  States that she was up all night with him helping care for him however typically his daughter cares for him at the ranch.  She was recently hospitalized July 12 through the 16th for acute respiratory failure with hypoxia due to an asthma/COPD exacerbation.  States she went into an asthma exacerbation because she could not get to see her PCP regularly due to living outside of town 75 miles an needing to care for her partner so she could not get her inhalers refilled and went to a COPD exacerbation.  She now establishing today, and also is reestablishing with pulmonary later this month.  States in the past she was having side effects from Breo, having brain fog and muscle spasm mostly in thighs and across mid back it was only taking her Breo inhaler once every few days.    She is having mobility limitations due to gait and COPD that is impairing her ADL's. She gets tired quickly due to lung disease and needs walker to assist her for safety, requesting seated walker as when she is out in public she needs to sit down to help prevent falls.     COPD (chronic obstructive pulmonary disease) (HCC)  Chronic, she is reestablishing with pulmonary later this month.  Her current medication regimen is DuoNeb 3 mL 4 times daily, Ventolin  inhaler as needed, Robitussin-DM cough syrup at night as needed for coughing and congestion, Breo elliptica 100-25 mcg elation aerosol 1 puff daily, Flonase 2 sprays daily, and daily over-the-counter antihistamine such as Claritin.  She states that she feels that her COPD is exacerbated by allergies.   Needing a portable concentrator, walking O2 drops below 88% in clinic on walk test w/out oxygen.     Anxiety  Chronic, states that she has anxiety related to her COPD and feeling like she cannot breathe typically.  States that she does do breathing exercises (Niko newsome) to assist with this.     Hx of smoking  States that she is no longer smoking and does not need assistance with this and she is no longer tobacco dependent      Current Outpatient Medications Ordered in Epic   Medication Sig Dispense Refill    benzonatate (TESSALON) 100 MG Cap TAKE 1 CAPSULE BY MOUTH THREE TIMES DAILY AS NEEDED FOR COUGH FOR 10 DAYS      ipratropium-albuterol (DUONEB) 0.5-2.5 (3) MG/3ML nebulizer solution Take 3 mL by nebulization 4 times a day. 1 Each 3    Fluticasone Furoate-Vilanterol (BREO ELLIPTA) 100-25 MCG/INH AEROSOL POWDER, BREATH ACTIVATED Inhale 1 Puff every day. Rinse mouth after use. 1 Each 3    guaiFENesin dextromethorphan (ROBITUSSIN DM) 100-10 MG/5ML Syrup syrup Take 10 mL by mouth every 6 hours as needed for Cough. 840 mL     VENTOLIN  (90 Base) MCG/ACT Aero Soln inhalation aerosol INHALE 2 PUFFS BY MOUTH 4 TIMES DAILY FOR 30 DAYS AS NEEDED FOR WHEEZING 8.5 g 5    Ascorbic Acid (VITAMIN C) 1000 MG Tab Take  by mouth.      multivitamin (THERAGRAN) Tab Take 1 Tablet by mouth every day.      cyanocobalamin (VITAMIN B12) 1000 MCG Tab Take 1,000 mcg by mouth every day.      vitamin D3 (CHOLECALCIFEROL) 1000 Unit (25 mcg) Tab Take 1,000 Units by mouth every day.      vitamin e (VITAMIN E) 400 UNIT Cap Take 400 Units by mouth every day.      calcium citrate (CALCITRATE) 950 (200 Ca) MG Tab Take 950 mg by mouth every day.   "    fluticasone (FLONASE) 50 MCG/ACT nasal spray Administer 2 Sprays into affected nostril(S) every day.      loratadine (CLARITIN) 10 MG Tab Take 10 mg by mouth every day.       No current [facility]-ordered facility-administered medications on file.       Health Maintenance: Completed      Objective:       Exam: /60 (BP Location: Right arm, Patient Position: Sitting, BP Cuff Size: Adult)   Pulse 74   Temp 36.3 °C (97.4 °F) (Temporal)   Resp 18   Ht 1.651 m (5' 5\")   Wt 82.2 kg (181 lb 4.8 oz)   SpO2 96%  Body mass index is 30.17 kg/m².    Physical Exam  Vitals reviewed.   Constitutional:       Appearance: Normal appearance.   Eyes:      Conjunctiva/sclera: Conjunctivae normal.      Pupils: Pupils are equal, round, and reactive to light.   Cardiovascular:      Rate and Rhythm: Normal rate and regular rhythm.      Pulses: Normal pulses.      Heart sounds: Normal heart sounds. No murmur heard.  Pulmonary:      Effort: Pulmonary effort is normal. No respiratory distress.      Breath sounds: No stridor. Wheezing and rhonchi present. No rales.      Comments: Slight rhonchi and wheezing on exam, cleared with coughing.  Chest:      Chest wall: No tenderness.   Abdominal:      General: Abdomen is flat. Bowel sounds are normal.   Musculoskeletal:      Right lower leg: No edema.      Left lower leg: No edema.   Skin:     General: Skin is warm and dry.   Neurological:      Mental Status: She is alert and oriented to person, place, and time.   Psychiatric:         Mood and Affect: Mood normal.         Behavior: Behavior normal.          A chaperone was offered to the patient during today's exam. Chaperone name: daughter was present.        Assessment & Plan:   76 y.o. female with the following -    1. Need for vaccination  - Pneumococcal Conjugate Vaccine 20-Valent (19 yrs+)  - TDAP VACCINE =>8YO IM    2. Chronic obstructive pulmonary disease, unspecified COPD type (HCC)  Chronic, currently stable on medication regimen " that she is now doing regularly.  Level follow-up with pulmonary at the end of the month.  3. Asthma with acute exacerbation, unspecified asthma severity, unspecified whether persistent  - ipratropium-albuterol (DUONEB) 0.5-2.5 (3) MG/3ML nebulizer solution; Take 3 mL by nebulization 4 times a day.  Dispense: 1 Each; Refill: 3  - Fluticasone Furoate-Vilanterol (BREO ELLIPTA) 100-25 MCG/INH AEROSOL POWDER, BREATH ACTIVATED; Inhale 1 Puff every day. Rinse mouth after use.  Dispense: 1 Each; Refill: 3    4. Anxiety  Chronic, stable and improved with breathing exercises.  5. Healthcare maintenance  - CBC WITH DIFFERENTIAL; Future  - Comp Metabolic Panel; Future  - TSH WITH REFLEX TO FT4; Future  - VITAMIN D,25 HYDROXY; Future    6. Elevated glucose  - HEMOGLOBIN A1C; Future    7. Encounter for education of caregiver  - REFERRAL TO CARE MANAGEMENT    8. Hx of Smoking  Patient no longer smoking.  Other orders  - benzonatate (TESSALON) 100 MG Cap; TAKE 1 CAPSULE BY MOUTH THREE TIMES DAILY AS NEEDED FOR COUGH FOR 10 DAYS       I spent a total of 45  minutes with record review, exam, communication with the patient, communication with other providers, and documentation of this encounter.    Return in about 6 weeks (around 9/14/2022).    Please note that this dictation was created using voice recognition software. I have made every reasonable attempt to correct obvious errors, but I expect that there are errors of grammar and possibly content that I did not discover before finalizing the note.

## 2022-08-03 NOTE — PROGRESS NOTES
KAR contacted pt's daughter Kadie. She is interested in becoming pt's paid caregiver.  KAR discussed [employer] program and process for becoming caregiver.  She will need to become hired on with contracted home care agency and pt will need to apply for services with [employer].  KAR sent daughter application and FE agency/ provider list.  EVIE Mike will follow up with pt's daughter in two weeks.

## 2022-08-03 NOTE — LETTER
[facility]  SANIYA Justice  [address]  [place] [address]  Fax: [phone]   Authorization for Release/Disclosure of   Protected Health Information   Name: PATRICA QIU : 1945 SSN: [id number]   Address: [facility] [place] [address] Phone:    [phone] (home)    I authorize the entity listed below to release/disclose the PHI below to:   [facility]/SANIYA Justice and SANIYA Justice   Provider or Entity Name:  [facility]    Address   City, State, [facility]   Phone:      Fax:  [phone]   Reason for request: continuity of care   Information to be released:    [  ] LAST COLONOSCOPY,  including any PATH REPORT and follow-up  [  ] LAST FIT/COLOGUARD RESULT [  ] LAST DEXA  [  ] LAST MAMMOGRAM  [  ] LAST PAP  [  ] LAST LABS [  ] RETINA EXAM REPORT  [  ] IMMUNIZATION RECORDS  [x  ] Release all info      [  ] Check here and initial the line next to each item to release ALL health information INCLUDING  _____ Care and treatment for drug and / or alcohol abuse  _____ HIV testing, infection status, or AIDS  _____ Genetic Testing    DATES OF SERVICE OR TIME PERIOD TO BE DISCLOSED: _____________  I understand and acknowledge that:  * This Authorization may be revoked at any time by you in writing, except if your health information has already been used or disclosed.  * Your health information that will be used or disclosed as a result of you signing this authorization could be re-disclosed by the recipient. If this occurs, your re-disclosed health information may no longer be protected by State or Federal laws.  * You may refuse to sign this Authorization. Your refusal will not affect your ability to obtain treatment.  * This Authorization becomes effective upon signing and will  on (date) __________.      If no date is indicated, this Authorization will  one (1) year from the signature date.    Name: Patrica Qiu    Continuity of  Care    Date:     8/9/2022       PLEASE FAX REQUESTED RECORDS BACK TO: [phone]

## 2022-08-03 NOTE — PATIENT INSTRUCTIONS
Take Ayr    Chronic Obstructive Pulmonary Disease  Chronic obstructive pulmonary disease (COPD) is a long-term (chronic) lung problem. When you have COPD, it is hard for air to get in and out of your lungs. Usually the condition gets worse over time, and your lungs will never return to normal. There are things you can do to keep yourself as healthy as possible.  · Your doctor may treat your condition with:  ? Medicines.  ? Oxygen.  ? Lung surgery.  · Your doctor may also recommend:  ? Rehabilitation. This includes steps to make your body work better. It may involve a team of specialists.  ? Quitting smoking, if you smoke.  ? Exercise and changes to your diet.  ? Comfort measures (palliative care).  Follow these instructions at home:  Medicines  · Take over-the-counter and prescription medicines only as told by your doctor.  · Talk to your doctor before taking any cough or allergy medicines. You may need to avoid medicines that cause your lungs to be dry.  Lifestyle  · If you smoke, stop. Smoking makes the problem worse. If you need help quitting, ask your doctor.  · Avoid being around things that make your breathing worse. This may include smoke, chemicals, and fumes.  · Stay active, but remember to rest as well.  · Learn and use tips on how to relax.  · Make sure you get enough sleep. Most adults need at least 7 hours of sleep every night.  · Eat healthy foods. Eat smaller meals more often. Rest before meals.  Controlled breathing  Learn and use tips on how to control your breathing as told by your doctor. Try:  · Breathing in (inhaling) through your nose for 1 second. Then, pucker your lips and breath out (exhale) through your lips for 2 seconds.  · Putting one hand on your belly (abdomen). Breathe in slowly through your nose for 1 second. Your hand on your belly should move out. Pucker your lips and breathe out slowly through your lips. Your hand on your belly should move in as you breathe out.    Controlled  coughing  Learn and use controlled coughing to clear mucus from your lungs. Follow these steps:  1. Lean your head a little forward.  2. Breathe in deeply.  3. Try to hold your breath for 3 seconds.  4. Keep your mouth slightly open while coughing 2 times.  5. Spit any mucus out into a tissue.  6. Rest and do the steps again 1 or 2 times as needed.  General instructions  · Make sure you get all the shots (vaccines) that your doctor recommends. Ask your doctor about a flu shot and a pneumonia shot.  · Use oxygen therapy and pulmonary rehabilitation if told by your doctor. If you need home oxygen therapy, ask your doctor if you should buy a tool to measure your oxygen level (oximeter).  · Make a COPD action plan with your doctor. This helps you to know what to do if you feel worse than usual.  · Manage any other conditions you have as told by your doctor.  · Avoid going outside when it is very hot, cold, or humid.  · Avoid people who have a sickness you can catch (contagious).  · Keep all follow-up visits as told by your doctor. This is important.  Contact a doctor if:  · You cough up more mucus than usual.  · There is a change in the color or thickness of the mucus.  · It is harder to breathe than usual.  · Your breathing is faster than usual.  · You have trouble sleeping.  · You need to use your medicines more often than usual.  · You have trouble doing your normal activities such as getting dressed or walking around the house.  Get help right away if:  · You have shortness of breath while resting.  · You have shortness of breath that stops you from:  ? Being able to talk.  ? Doing normal activities.  · Your chest hurts for longer than 5 minutes.  · Your skin color is more blue than usual.  · Your pulse oximeter shows that you have low oxygen for longer than 5 minutes.  · You have a fever.  · You feel too tired to breathe normally.  Summary  · Chronic obstructive pulmonary disease (COPD) is a long-term lung  problem.  · The way your lungs work will never return to normal. Usually the condition gets worse over time. There are things you can do to keep yourself as healthy as possible.  · Take over-the-counter and prescription medicines only as told by your doctor.  · If you smoke, stop. Smoking makes the problem worse.  This information is not intended to replace advice given to you by your health care provider. Make sure you discuss any questions you have with your health care provider.  Document Released: 06/05/2009 Document Revised: 11/30/2018 Document Reviewed: 01/22/2018  [employer] Patient Education © 2020 [employer]      Chronic Obstructive Pulmonary Disease Exacerbation  Chronic obstructive pulmonary disease (COPD) is a long-term (chronic) lung problem. In COPD, the flow of air from the lungs is limited. COPD exacerbations are times that breathing gets worse and you need more than your normal treatment. Without treatment, they can be life threatening. If they happen often, your lungs can become more damaged. If your COPD gets worse, your doctor may treat you with:  · Medicines.  · Oxygen.  · Different ways to clear your airway, such as using a mask.  Follow these instructions at home:  Medicines  · Take over-the-counter and prescription medicines only as told by your doctor.  · If you take an antibiotic or steroid medicine, do not stop taking the medicine even if you start to feel better.  · Keep up with shots (vaccinations) as told by your doctor. Be sure to get a yearly (annual) flu shot.  Lifestyle  · Do not smoke. If you need help quitting, ask your doctor.  · Eat healthy foods.  · Exercise regularly.  · Get plenty of sleep.  · Avoid tobacco smoke and other things that can bother your lungs.  · Wash your hands often with soap and water. This will help keep you from getting an infection. If you cannot use soap and water, use hand .  · During flu season, avoid areas that are crowded with  people.  General instructions  · Drink enough fluid to keep your pee (urine) clear or pale yellow. Do not do this if your doctor has told you not to.  · Use a cool mist machine (vaporizer).  · If you use oxygen or a machine that turns medicine into a mist (nebulizer), continue to use it as told.  · Follow all instructions for rehabilitation. These are steps you can take to make your body work better.  · Keep all follow-up visits as told by your doctor. This is important.  Contact a doctor if:  · Your COPD symptoms get worse than normal.  Get help right away if:  · You are short of breath and it gets worse.  · You have trouble talking.  · You have chest pain.  · You cough up blood.  · You have a fever.  · You keep throwing up (vomiting).  · You feel weak or you pass out (faint).  · You feel confused.  · You are not able to sleep because of your symptoms.  · You are not able to do daily activities.  Summary  · COPD exacerbations are times that breathing gets worse and you need more treatment than normal.  · COPD exacerbations can be very serious and may cause your lungs to become more damaged.  · Do not smoke. If you need help quitting, ask your doctor.  · Stay up-to-date on your shots. Get a flu shot every year.  This information is not intended to replace advice given to you by your health care provider. Make sure you discuss any questions you have with your health care provider.  Document Released: 12/06/2012 Document Revised: 11/30/2018 Document Reviewed: 01/22/2018  [employer] Patient Education © 2020 [employer]

## 2022-08-03 NOTE — ASSESSMENT & PLAN NOTE
States that she is no longer smoking and does not need assistance with this and she is no longer tobacco dependent

## 2022-08-03 NOTE — ASSESSMENT & PLAN NOTE
Chronic, she is reestablishing with pulmonary later this month.  Her current medication regimen is DuoNeb 3 mL 4 times daily, Ventolin inhaler as needed, Robitussin-DM cough syrup at night as needed for coughing and congestion, Breo elliptica 100-25 mcg elation aerosol 1 puff daily, Flonase 2 sprays daily, and daily over-the-counter antihistamine such as Claritin.  She states that she feels that her COPD is exacerbated by allergies.   Needing a portable concentrator, walking O2 drops below 88% in clinic on walk test w/out oxygen.

## 2022-08-03 NOTE — PROGRESS NOTES
Nurse LYDIA met with patient today after PCP appointment. Provided information on CCM program. Pt here today to establish care with PCP. Daughter present at appointment. Dtr states she is primary caregiver and patient lives with daughter. Pt would be interested in enrollment in CCM program. Nurse provided patient with program brochure. Pt agreeable to nurse outreach call on Friday 8/5 to complete enrollment.    INITIAL CARE MANAGEMENT CARE PLAN and Assessment    CM outreach call to patient and daughter to complete intake assessment. Nurse spoke to daughter and completed intake. Pt currently lives with her daughter in [place]. Residence has stairs. Dtr reports there is a stair lift on the stairs. Pt relies on assistance from daughter for care. Dtr provides transportation and assists with ADL's as needed. Pt was recently hospitalized for acute respiratory failure and hypoxia, asthma exacerbation. Per daughter pt quit smoking several months ago. Per daughter pt has smoked for past 38 years. Daughter reports patient wears oxygen 24/7 at 3 liters. They are working with DME to get additional portable tanks and a portable oxygen concentrator. Daughter reports she is going to complete paperwork to apply for being a paid caregiver. Dtr has been in contact with . Intake completed and care plan discussed. Dtr has CM contact number to call if any questions or concerns.     Medication Self-Management Goals:     Reviewed medications listed below with patient.      Current Outpatient Medications:   •  benzonatate (TESSALON) 100 MG Cap, TAKE 1 CAPSULE BY MOUTH THREE TIMES DAILY AS NEEDED FOR COUGH FOR 10 DAYS, Disp: , Rfl:   •  ipratropium-albuterol (DUONEB) 0.5-2.5 (3) MG/3ML nebulizer solution, Take 3 mL by nebulization 4 times a day., Disp: 1 Each, Rfl: 3  •  Fluticasone Furoate-Vilanterol (BREO ELLIPTA) 100-25 MCG/INH AEROSOL POWDER, BREATH ACTIVATED, Inhale 1 Puff every day. Rinse mouth after use., Disp: 1 Each, Rfl:  3  •  guaiFENesin dextromethorphan (ROBITUSSIN DM) 100-10 MG/5ML Syrup syrup, Take 10 mL by mouth every 6 hours as needed for Cough., Disp: 840 mL, Rfl:   •  VENTOLIN  (90 Base) MCG/ACT Aero Soln inhalation aerosol, INHALE 2 PUFFS BY MOUTH 4 TIMES DAILY FOR 30 DAYS AS NEEDED FOR WHEEZING, Disp: 8.5 g, Rfl: 5  •  Ascorbic Acid (VITAMIN C) 1000 MG Tab, Take  by mouth., Disp: , Rfl:   •  multivitamin (THERAGRAN) Tab, Take 1 Tablet by mouth every day., Disp: , Rfl:   •  cyanocobalamin (VITAMIN B12) 1000 MCG Tab, Take 1,000 mcg by mouth every day., Disp: , Rfl:   •  vitamin D3 (CHOLECALCIFEROL) 1000 Unit (25 mcg) Tab, Take 1,000 Units by mouth every day., Disp: , Rfl:   •  vitamin e (VITAMIN E) 400 UNIT Cap, Take 400 Units by mouth every day., Disp: , Rfl:   •  calcium citrate (CALCITRATE) 950 (200 Ca) MG Tab, Take 950 mg by mouth every day., Disp: , Rfl:   •  fluticasone (FLONASE) 50 MCG/ACT nasal spray, Administer 2 Sprays into affected nostril(S) every day., Disp: , Rfl:   •  loratadine (CLARITIN) 10 MG Tab, Take 10 mg by mouth every day., Disp: , Rfl:          Goal: Continue to take medications as directed.       Physical/Functional/Environmental Status:        One or more falls in the last year: No  Advised to use a cane or walker to get around safely: Yes  Feels unsteady when walking: No  Steadies self on furniture while walking at home: No  Worried about falling: Yes  Needs to push with hands when rising from a chair: Yes  Has trouble stepping up onto a curb / using stairs: Yes  Often has to rush to the toilet: No  Has lost some feeling in feet: No  Takes medicine that makes him/her feel lightheaded or more tired than usual: No  Takes medicine to sleep or improve mood: No  Often feels sad or depressed: No  STEADI Risk for Falling Score: 5       Goal: Prevent falls, increase strength     Financial Status: Pt currently on social security. Dtr reports approximately $1600 a month that pt receives in SS.         Transportation Status: Dtr assists with transportation.    Goal: N/A    Mental/Behavioral/Psychosocial Status:    Depression Screen (PHQ-2/PHQ-9) 11/21/2019 7/13/2022 8/3/2022   PHQ-2 Total Score - 0 -   PHQ-2 Total Score 0 - 0       Interpretation of PHQ-9 Total Score   Score Severity   1-4 No Depression   5-9 Mild Depression   10-14 Moderate Depression   15-19 Moderately Severe Depression   20-27 Severe Depression       Goal:  Dtr reports pt has not reported any depression.      Chronic Care Management Care Plan      Goal:  Pt will have improved understanding of COPD and signs and symptoms of exacerbations.  Barriers:  weakness related to recent asthma exacerbation and COPD.  Interventions:  Continue taking medications as directed, watch for s/s of COPD exacerbations, including worsening  Shortness of breath, increase in secretions, fever or chills. Follow closely with pulmonary specialists. Follow COPD  Action plan. Note if in green zone, yellow zone or red zone. Notify PCP or pulmonary provider if any worsening symptoms.  Per daughter, pt received COPD booklet at hospital.    Start Date: 8/5/22  End date:      Personal  Care Management  Melissa Vargas RN, Care Coordinator  [phone]     Next Scheduled patient outreach:  One month

## 2022-08-04 VITALS
DIASTOLIC BLOOD PRESSURE: 60 MMHG | BODY MASS INDEX: 30.21 KG/M2 | SYSTOLIC BLOOD PRESSURE: 114 MMHG | WEIGHT: 181.3 LBS | OXYGEN SATURATION: 96 % | HEIGHT: 65 IN | TEMPERATURE: 97.4 F | RESPIRATION RATE: 18 BRPM | HEART RATE: 74 BPM

## 2022-08-05 SDOH — ECONOMIC STABILITY: FOOD INSECURITY: WITHIN THE PAST 12 MONTHS, THE FOOD YOU BOUGHT JUST DIDN'T LAST AND YOU DIDN'T HAVE MONEY TO GET MORE.: NEVER TRUE

## 2022-08-05 SDOH — ECONOMIC STABILITY: TRANSPORTATION INSECURITY
IN THE PAST 12 MONTHS, HAS LACK OF TRANSPORTATION KEPT YOU FROM MEETINGS, WORK, OR FROM GETTING THINGS NEEDED FOR DAILY LIVING?: NO

## 2022-08-05 SDOH — ECONOMIC STABILITY: FOOD INSECURITY: WITHIN THE PAST 12 MONTHS, YOU WORRIED THAT YOUR FOOD WOULD RUN OUT BEFORE YOU GOT MONEY TO BUY MORE.: NEVER TRUE

## 2022-08-05 SDOH — ECONOMIC STABILITY: INCOME INSECURITY: IN THE LAST 12 MONTHS, WAS THERE A TIME WHEN YOU WERE NOT ABLE TO PAY THE MORTGAGE OR RENT ON TIME?: NO

## 2022-08-05 SDOH — ECONOMIC STABILITY: HOUSING INSECURITY
IN THE LAST 12 MONTHS, WAS THERE A TIME WHEN YOU DID NOT HAVE A STEADY PLACE TO SLEEP OR SLEPT IN A SHELTER (INCLUDING NOW)?: NO

## 2022-08-05 SDOH — HEALTH STABILITY: PHYSICAL HEALTH: ON AVERAGE, HOW MANY MINUTES DO YOU ENGAGE IN EXERCISE AT THIS LEVEL?: 0 MIN

## 2022-08-05 SDOH — ECONOMIC STABILITY: TRANSPORTATION INSECURITY
IN THE PAST 12 MONTHS, HAS THE LACK OF TRANSPORTATION KEPT YOU FROM MEDICAL APPOINTMENTS OR FROM GETTING MEDICATIONS?: NO

## 2022-08-05 SDOH — HEALTH STABILITY: PHYSICAL HEALTH: ON AVERAGE, HOW MANY DAYS PER WEEK DO YOU ENGAGE IN MODERATE TO STRENUOUS EXERCISE (LIKE A BRISK WALK)?: 0 DAYS

## 2022-08-05 ASSESSMENT — SOCIAL DETERMINANTS OF HEALTH (SDOH)
IN A TYPICAL WEEK, HOW MANY TIMES DO YOU TALK ON THE PHONE WITH FAMILY, FRIENDS, OR NEIGHBORS?: MORE THAN THREE TIMES A WEEK
ARE YOU MARRIED, WIDOWED, DIVORCED, SEPARATED, NEVER MARRIED, OR LIVING WITH A PARTNER?: LIVING WITH PARTNER
HOW OFTEN DO YOU GET TOGETHER WITH FRIENDS OR RELATIVES?: MORE THAN THREE TIMES A WEEK
HOW HARD IS IT FOR YOU TO PAY FOR THE VERY BASICS LIKE FOOD, HOUSING, MEDICAL CARE, AND HEATING?: SOMEWHAT HARD

## 2022-08-05 ASSESSMENT — PATIENT HEALTH QUESTIONNAIRE - PHQ9: CLINICAL INTERPRETATION OF PHQ2 SCORE: 0

## 2022-08-17 ENCOUNTER — OFFICE VISIT (OUTPATIENT)
Dept: SLEEP MEDICINE | Facility: MEDICAL CENTER | Age: 77
End: 2022-08-17
Payer: MEDICARE

## 2022-08-17 VITALS
HEIGHT: 65 IN | WEIGHT: 183 LBS | DIASTOLIC BLOOD PRESSURE: 62 MMHG | BODY MASS INDEX: 30.49 KG/M2 | SYSTOLIC BLOOD PRESSURE: 110 MMHG | OXYGEN SATURATION: 93 % | HEART RATE: 88 BPM

## 2022-08-17 DIAGNOSIS — J96.01 ACUTE RESPIRATORY FAILURE WITH HYPOXIA (HCC): ICD-10-CM

## 2022-08-17 DIAGNOSIS — J45.50 SEVERE PERSISTENT ASTHMA WITHOUT COMPLICATION: ICD-10-CM

## 2022-08-17 DIAGNOSIS — Z87.891 HX OF SMOKING: ICD-10-CM

## 2022-08-17 PROCEDURE — 99215 OFFICE O/P EST HI 40 MIN: CPT | Performed by: INTERNAL MEDICINE

## 2022-08-17 RX ORDER — ALBUTEROL SULFATE 90 UG/1
2 AEROSOL, METERED RESPIRATORY (INHALATION) EVERY 6 HOURS PRN
Qty: 3 EACH | Refills: 3 | Status: SHIPPED | OUTPATIENT
Start: 2022-08-17 | End: 2023-11-27 | Stop reason: SDUPTHER

## 2022-08-17 ASSESSMENT — FIBROSIS 4 INDEX: FIB4 SCORE: 1.089724735885168388

## 2022-08-17 NOTE — PROGRESS NOTES
"Pulmonary Clinic Note    Chief Complaint:  Chief Complaint   Patient presents with    Follow-Up     Last seen 11/17/21 Dr. Mejias      HPI:   Patrica Qiu is a pleasant 76 y.o. female who has been established in our pulmonary clinic.  She has a history of moderate to severe persistent asthma, environmental allergies, peripheral eosinophilia, and obesity.  She quit smoking Father's Day of 2022.  Also has a history of not tolerating medications.    From Dr. Mejias's note in November 2021: \"This patient is a 75 y.o. female whom is followed in our clinic for COPD with RAD component last seen by Dr. Oliver on 5/14/21. Pt is an active tobacco smoker with >20 pk year hx. She was referred to us for cough and tx initially with spiriva which caused adverse side effects. She has mild airflow obstruction with normal FEV post BD at 80% predicted based on updated PFTs today. Normal TLC with air trapping and preserved DLCO. She uses BReo as needed but has not needed this or her rescue inhaler for several months. She does have environmental allergies with associated sinus congesiton and peripheral eosinophilia in the past. Functionally she is quite well, MMRC 1. [facility]. \"    July 2022: Evaluated in the ED for exacerbation of asthma/COPD.  Apparently she was not taking her inhalers due to financial/insurance issues.  She was placed on steroids and discharged with oxygen.    TODAY: Medication list includes Breo Ellipta, DuoNebs, Ventolin inhaler.  She request switching from Ventolin to albuterol for insurance purposes.  She states that she has been in the ED twice in the last 6 months.  The first time was in [place] and the second time was at [facility].  The first time was when it was very windy and the pollen counts were high which caused her to have an asthma exacerbation.  She was treated with prednisone.  She had been an active smoker at the time but quit and has not smoked since.  The more recent " visit, the wind had also picked up but she had also run out of inhalers due to insurance issues.  At that ED visit, she was found to be hypoxic and was started on oxygen.  She was also given prednisone.  She became more compliant with her inhalers.  She is now using Breo as prescribed, whereas before she was using it intermittently.    She has a history of absolute eosinophilia as high as 1480.    I personally reviewed her PFTs from 2021.  No fixed obstruction.  Positive bronchodilator response.  Air trapping.    I personally reviewed her CT scan from 2022.  She has diffuse coronary artery calcification.  No concerning lung findings.    she has 2 ED visits in the past year for respiratory issues.    Her most recent transthoracic echocardiogram in 2022 is unremarkable.    Exacerbations this year:    MMRC Grade:   0: Breathless only during strenuous exercise  1: Short of breath when hurrying or going up a small hill  2: Walks slower than friends due to breathlessness, has to stop at own pace  3: Stops to catch breath on level ground after 100m  4: Breathless with ambulating around house or ADLs    Past Medical History:   Diagnosis Date    Asthma     Chest tightness     Chronic obstructive pulmonary disease (HCC)     Cough     Shortness of breath     Sputum production     Wheezing        Past Surgical History:   Procedure Laterality Date    DILATION AND CURETTAGE      TUBAL LIGATION         Social History     Socioeconomic History    Marital status: Not on file     Spouse name: Not on file    Number of children: Not on file    Years of education: Not on file    Highest education level: Not on file   Occupational History    Not on file   Tobacco Use    Smoking status: Former     Packs/day: 0.25     Years: 32.00     Pack years: 8.00     Types: Cigarettes     Quit date: 2022     Years since quittin.1    Smokeless tobacco: Never   Vaping Use    Vaping Use: Former    Passive vaping  exposure: Yes   Substance and Sexual Activity    Alcohol use: Not Currently     Comment: not for 5 years    Drug use: No    Sexual activity: Not on file   Other Topics Concern    Not on file   Social History Narrative    Has a life partner with 32 years    3 children, 1 lives in [place], son is  a , currently living with daughter Kadie     Retired, worked for [employer] 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     Social Determinants of Health     Financial Resource Strain: Medium Risk    Difficulty of Paying Living Expenses: Somewhat hard   Food Insecurity: No Food Insecurity    Worried About Running Out of Food in the Last Year: Never true    Ran Out of Food in the Last Year: Never true   Transportation Needs: No Transportation Needs    Lack of Transportation (Medical): No    Lack of Transportation (Non-Medical): No   Physical Activity: Inactive    Days of Exercise per Week: 0 days    Minutes of Exercise per Session: 0 min   Stress: Not on file   Social Connections: Unknown    Frequency of Communication with Friends and Family: More than three times a week    Frequency of Social Gatherings with Friends and Family: More than three times a week    Attends Rastafari Services: Not on file    Active Member of Clubs or Organizations: Not on file    Attends Club or Organization Meetings: Not on file    Marital Status: Living with partner   Intimate Partner Violence: Not on file   Housing Stability: Unknown    Unable to Pay for Housing in the Last Year: No    Number of Places Lived in the Last Year: Not on file    Unstable Housing in the Last Year: No          Family History   Problem Relation Age of Onset    Heart Disease Mother     Heart Disease Father     Cancer Father     Asthma Father     Dementia Father     Heart Attack Brother     Heart Attack Brother     Parkinson's Disease Brother     Asthma Brother        Current Outpatient Medications on File Prior to Visit   Medication Sig Dispense  "Refill    benzonatate (TESSALON) 100 MG Cap TAKE 1 CAPSULE BY MOUTH THREE TIMES DAILY AS NEEDED FOR COUGH FOR 10 DAYS      ipratropium-albuterol (DUONEB) 0.5-2.5 (3) MG/3ML nebulizer solution Take 3 mL by nebulization 4 times a day. 1 Each 3    Fluticasone Furoate-Vilanterol (BREO ELLIPTA) 100-25 MCG/INH AEROSOL POWDER, BREATH ACTIVATED Inhale 1 Puff every day. Rinse mouth after use. 1 Each 3    guaiFENesin dextromethorphan (ROBITUSSIN DM) 100-10 MG/5ML Syrup syrup Take 10 mL by mouth every 6 hours as needed for Cough. 840 mL     VENTOLIN  (90 Base) MCG/ACT Aero Soln inhalation aerosol INHALE 2 PUFFS BY MOUTH 4 TIMES DAILY FOR 30 DAYS AS NEEDED FOR WHEEZING 8.5 g 5    Ascorbic Acid (VITAMIN C) 1000 MG Tab Take  by mouth.      multivitamin (THERAGRAN) Tab Take 1 Tablet by mouth every day.      cyanocobalamin (VITAMIN B12) 1000 MCG Tab Take 1,000 mcg by mouth every day.      vitamin D3 (CHOLECALCIFEROL) 1000 Unit (25 mcg) Tab Take 1,000 Units by mouth every day.      vitamin e (VITAMIN E) 400 UNIT Cap Take 400 Units by mouth every day.      calcium citrate (CALCITRATE) 950 (200 Ca) MG Tab Take 950 mg by mouth every day.      fluticasone (FLONASE) 50 MCG/ACT nasal spray Administer 2 Sprays into affected nostril(S) every day.      loratadine (CLARITIN) 10 MG Tab Take 10 mg by mouth every day.       No current facility-administered medications on file prior to visit.       Allergies: Patient has no known allergies.      ROS: A 12 point ROS was performed on intake and during my interview. ROS negative unless specifically noted in HPI.     Vitals:  /62 (BP Location: Right arm, Patient Position: Sitting, BP Cuff Size: Adult)   Pulse 88   Ht 1.651 m (5' 5\")   Wt 83 kg (183 lb)   SpO2 93%     Physical Exam:  Physical Exam  Vitals reviewed. Exam conducted with a chaperone present.   Constitutional:       Appearance: Normal appearance. She is obese.   Eyes:      Pupils: Pupils are equal, round, and reactive to " light.   Cardiovascular:      Rate and Rhythm: Normal rate and regular rhythm.      Heart sounds: No murmur heard.    No gallop.   Pulmonary:      Effort: Pulmonary effort is normal. No respiratory distress.      Breath sounds: Normal breath sounds. No wheezing or rales.   Musculoskeletal:      Right lower leg: Edema present.      Left lower leg: Edema present.   Skin:     General: Skin is warm and dry.   Neurological:      General: No focal deficit present.      Mental Status: She is alert and oriented to person, place, and time.   Psychiatric:         Mood and Affect: Mood normal.         Behavior: Behavior normal.       Laboratory Data: I personally reviewed labs including historical lab trends.       Assessment/Plan:    Problem List Items Addressed This Visit       Acute respiratory failure with hypoxia, asthma exacerbation (HCC)    Hx of smoking    Severe persistent asthma without complication    Relevant Medications    albuterol 108 (90 Base) MCG/ACT Aero Soln inhalation aerosol    Other Relevant Orders    Referral to Pulmonary Rehab     I reviewed her CT scan with her.  Her lungs appear healthy but she does have coronary calcifications.  I advised her to follow-up with Ayla Castelan to discuss cardiac risk factors.  The most important risk factor is that she quit smoking on Father's Day.  We discussed that quitting is difficult and that at this point truly about craving control.  She is still on 2 to 3 L of oxygen.  She likely had a bronchitis in July that is still taking time to improve.  Her echocardiogram was unremarkable and the mild edema she has on exam today is likely attributable to venous stasis.     Plan:  -Continue current inhalers.  Reminded to rinse her mouth after using Breo.  Give her inhaler instructions including diligently using Breo every day despite whether or not she feels well.  -I encouraged her to use her nebulizer and rescue inhalers more liberally.  She has been thinking that her  shortness of breath was due to hypoxia and has been under utilizing her inhalers.  -I discussed pulmonary rehab with her.  She is interested in attending.  Referral was placed.  -Discuss vaccines at next appointment.  Ran out of time today.  -RTC in 3 months      Return in about 3 months (around 11/17/2022), or if symptoms worsen or fail to improve.       Total consult time was 45 min. This includes reviewing previous provider notes, personally reviewing previous imaging and spirometry, educating the patient about their disease process, and inhaler education.   __________  Sumanth Smith, DO  Pulmonary and Critical Care Medicine  [facility]    Please note that this dictation was created using voice recognition software. The accuracy of the dictation is limited to the abilities of the software. I have made every reasonable attempt to correct obvious errors, but I expect that there are errors of grammar and possibly content that I did not discover before finalizing the note.

## 2022-08-17 NOTE — PATIENT INSTRUCTIONS
I have sent your inhalers to [facility]  Keep an eye out for your pulmonary rehab referral  It is ok if your oxygen level is 88% or higher.  Follow up with us in 3 months

## 2022-08-18 ENCOUNTER — PATIENT OUTREACH (OUTPATIENT)
Dept: HEALTH INFORMATION MANAGEMENT | Facility: OTHER | Age: 77
End: 2022-08-18
Payer: MEDICARE

## 2022-08-18 DIAGNOSIS — J44.1 CHRONIC OBSTRUCTIVE PULMONARY DISEASE WITH ACUTE EXACERBATION (HCC): ICD-10-CM

## 2022-08-18 NOTE — PROGRESS NOTES
8/18/2022  CHW called patient's daughter, Kadie, in regards to SW Ila emailed caregiver options. Kadie stated that patient was getting better and not in need of additional care at the moment. CHW informed Kadie to reach out should they be needing assistance.   Kadie inquired about obtaining a seated walker with big wheels for patient through insurance. CHW will reach out to RN and provider.

## 2022-08-22 PROBLEM — J44.9 CHRONIC OBSTRUCTIVE PULMONARY DISEASE (HCC): Status: ACTIVE | Noted: 2022-08-22

## 2022-09-08 ENCOUNTER — PATIENT OUTREACH (OUTPATIENT)
Dept: HEALTH INFORMATION MANAGEMENT | Facility: OTHER | Age: 77
End: 2022-09-08
Payer: MEDICARE

## 2022-09-08 DIAGNOSIS — J44.9 CHRONIC OBSTRUCTIVE PULMONARY DISEASE, UNSPECIFIED COPD TYPE (HCC): ICD-10-CM

## 2022-09-08 NOTE — PROGRESS NOTES
Nurse  outreach call for monthly CCM check. Nurse spoke to patient and reviewed care plan.  Pt reports she has been feeling good. Denies any shortness of breath or respiratory symptoms today. Pt states she is wearing oxygen at night. Reports wearing oxygen at 3 liters at night. Pt had follow-up with pulmonary provider. States she currently has all of her medications. Pt does get short of breath with exertion. Pt has her inhalers and nebulizer. States she is waiting to hear from pulmonary rehab. Pt has good family support. Denies needing any resources. Nurse reviewed s/s of COPD exacerbation. Pt is able to teach back symptoms to watch for.

## 2022-09-19 ENCOUNTER — HOSPITAL ENCOUNTER (OUTPATIENT)
Dept: LAB | Facility: MEDICAL CENTER | Age: 77
End: 2022-09-19
Attending: FAMILY MEDICINE
Payer: MEDICARE

## 2022-09-19 DIAGNOSIS — Z00.00 HEALTHCARE MAINTENANCE: ICD-10-CM

## 2022-09-19 DIAGNOSIS — R73.09 ELEVATED GLUCOSE: ICD-10-CM

## 2022-09-19 LAB
25(OH)D3 SERPL-MCNC: 55 NG/ML (ref 30–100)
ALBUMIN SERPL BCP-MCNC: 4.5 G/DL (ref 3.2–4.9)
ALBUMIN/GLOB SERPL: 2 G/DL
ALP SERPL-CCNC: 84 U/L (ref 30–99)
ALT SERPL-CCNC: 19 U/L (ref 2–50)
ANION GAP SERPL CALC-SCNC: 10 MMOL/L (ref 7–16)
AST SERPL-CCNC: 20 U/L (ref 12–45)
BASOPHILS # BLD AUTO: 1.5 % (ref 0–1.8)
BASOPHILS # BLD: 0.08 K/UL (ref 0–0.12)
BILIRUB SERPL-MCNC: 0.4 MG/DL (ref 0.1–1.5)
BUN SERPL-MCNC: 13 MG/DL (ref 8–22)
CALCIUM SERPL-MCNC: 9.5 MG/DL (ref 8.5–10.5)
CHLORIDE SERPL-SCNC: 104 MMOL/L (ref 96–112)
CO2 SERPL-SCNC: 27 MMOL/L (ref 20–33)
CREAT SERPL-MCNC: 0.73 MG/DL (ref 0.5–1.4)
EOSINOPHIL # BLD AUTO: 0.23 K/UL (ref 0–0.51)
EOSINOPHIL NFR BLD: 4.2 % (ref 0–6.9)
ERYTHROCYTE [DISTWIDTH] IN BLOOD BY AUTOMATED COUNT: 45.9 FL (ref 35.9–50)
EST. AVERAGE GLUCOSE BLD GHB EST-MCNC: 131 MG/DL
FASTING STATUS PATIENT QL REPORTED: NORMAL
GFR SERPLBLD CREATININE-BSD FMLA CKD-EPI: 85 ML/MIN/1.73 M 2
GLOBULIN SER CALC-MCNC: 2.3 G/DL (ref 1.9–3.5)
GLUCOSE SERPL-MCNC: 98 MG/DL (ref 65–99)
HBA1C MFR BLD: 6.2 % (ref 4–5.6)
HCT VFR BLD AUTO: 40.8 % (ref 37–47)
HGB BLD-MCNC: 13.1 G/DL (ref 12–16)
IMM GRANULOCYTES # BLD AUTO: 0.02 K/UL (ref 0–0.11)
IMM GRANULOCYTES NFR BLD AUTO: 0.4 % (ref 0–0.9)
LYMPHOCYTES # BLD AUTO: 1.38 K/UL (ref 1–4.8)
LYMPHOCYTES NFR BLD: 25.2 % (ref 22–41)
MCH RBC QN AUTO: 30 PG (ref 27–33)
MCHC RBC AUTO-ENTMCNC: 32.1 G/DL (ref 33.6–35)
MCV RBC AUTO: 93.4 FL (ref 81.4–97.8)
MONOCYTES # BLD AUTO: 0.38 K/UL (ref 0–0.85)
MONOCYTES NFR BLD AUTO: 6.9 % (ref 0–13.4)
NEUTROPHILS # BLD AUTO: 3.38 K/UL (ref 2–7.15)
NEUTROPHILS NFR BLD: 61.8 % (ref 44–72)
NRBC # BLD AUTO: 0 K/UL
NRBC BLD-RTO: 0 /100 WBC
PLATELET # BLD AUTO: 356 K/UL (ref 164–446)
PMV BLD AUTO: 9.3 FL (ref 9–12.9)
POTASSIUM SERPL-SCNC: 4.3 MMOL/L (ref 3.6–5.5)
PROT SERPL-MCNC: 6.8 G/DL (ref 6–8.2)
RBC # BLD AUTO: 4.37 M/UL (ref 4.2–5.4)
SODIUM SERPL-SCNC: 141 MMOL/L (ref 135–145)
TSH SERPL DL<=0.005 MIU/L-ACNC: 3.73 UIU/ML (ref 0.38–5.33)
WBC # BLD AUTO: 5.5 K/UL (ref 4.8–10.8)

## 2022-09-19 PROCEDURE — 83036 HEMOGLOBIN GLYCOSYLATED A1C: CPT

## 2022-09-19 PROCEDURE — 84443 ASSAY THYROID STIM HORMONE: CPT

## 2022-09-19 PROCEDURE — 36415 COLL VENOUS BLD VENIPUNCTURE: CPT

## 2022-09-19 PROCEDURE — 80053 COMPREHEN METABOLIC PANEL: CPT

## 2022-09-19 PROCEDURE — 82306 VITAMIN D 25 HYDROXY: CPT

## 2022-09-19 PROCEDURE — 85025 COMPLETE CBC W/AUTO DIFF WBC: CPT

## 2022-09-21 ENCOUNTER — OFFICE VISIT (OUTPATIENT)
Dept: MEDICAL GROUP | Facility: PHYSICIAN GROUP | Age: 77
End: 2022-09-21
Payer: MEDICARE

## 2022-09-21 VITALS
SYSTOLIC BLOOD PRESSURE: 130 MMHG | BODY MASS INDEX: 30.99 KG/M2 | TEMPERATURE: 97.9 F | RESPIRATION RATE: 16 BRPM | DIASTOLIC BLOOD PRESSURE: 68 MMHG | OXYGEN SATURATION: 94 % | HEART RATE: 84 BPM | WEIGHT: 186 LBS | HEIGHT: 65 IN

## 2022-09-21 DIAGNOSIS — Z12.31 ENCOUNTER FOR SCREENING MAMMOGRAM FOR MALIGNANT NEOPLASM OF BREAST: ICD-10-CM

## 2022-09-21 DIAGNOSIS — K59.09 OTHER CONSTIPATION: ICD-10-CM

## 2022-09-21 DIAGNOSIS — Z13.820 SCREENING FOR OSTEOPOROSIS: ICD-10-CM

## 2022-09-21 DIAGNOSIS — J44.9 CHRONIC OBSTRUCTIVE PULMONARY DISEASE, UNSPECIFIED COPD TYPE (HCC): ICD-10-CM

## 2022-09-21 DIAGNOSIS — R73.09 ELEVATED GLUCOSE: ICD-10-CM

## 2022-09-21 DIAGNOSIS — Z23 NEED FOR VACCINATION: ICD-10-CM

## 2022-09-21 DIAGNOSIS — E78.5 DYSLIPIDEMIA, GOAL LDL BELOW 100: ICD-10-CM

## 2022-09-21 PROBLEM — J96.01 ACUTE RESPIRATORY FAILURE WITH HYPOXIA (HCC): Status: RESOLVED | Noted: 2022-07-13 | Resolved: 2022-09-21

## 2022-09-21 PROCEDURE — 99214 OFFICE O/P EST MOD 30 MIN: CPT | Mod: 25 | Performed by: FAMILY MEDICINE

## 2022-09-21 PROCEDURE — 90662 IIV NO PRSV INCREASED AG IM: CPT | Performed by: FAMILY MEDICINE

## 2022-09-21 PROCEDURE — G0008 ADMIN INFLUENZA VIRUS VAC: HCPCS | Performed by: FAMILY MEDICINE

## 2022-09-21 ASSESSMENT — FIBROSIS 4 INDEX: FIB4 SCORE: 0.98

## 2022-09-21 NOTE — PROGRESS NOTES
Subjective:     CC:   Chief Complaint   Patient presents with    Results     09/19/22     Medication Problem     Albuterol, Neb, feeling nervous, headaches, dry mouth, tremors, dizzy, only after using rx, x 2 years        HPI:   Patrica presents today with lab work follow up and concerns regarding taking too much albuterol. When she takes this she gets leg cramping, nervousness, dizziness, headaches, tremors. She has been taking her Breo daily/regularly, nebulizer treatments 4 x daily and albuterol multiple times daily as she    Discussed her lab work today in clinic, overall her labs were looking good however her A1c was elevated to 6.2 so we did discuss her prediabetes and dietary and lifestyle changes.  Drinking 4 16 ounce bottles of water daily is a struggle for her for her she is having issues with constipation.  Encouraged her to increase her fiber and water intake continue on stool softeners.     Chronic obstructive pulmonary disease (HCC)  Chronic, she is reestablishing with pulmonary later this month.  Her current medication regimen is DuoNeb 3 mL 4 times daily, Ventolin inhaler as needed, Robitussin-DM cough syrup at night as needed for coughing and congestion, Breo elliptica 100-25 mcg elation aerosol 1 puff daily, Flonase 2 sprays daily, and daily over-the-counter antihistamine such as Claritin.  She states that she feels that her COPD is exacerbated by allergies.   Needing a portable concentrator, walking O2 drops below 88% in clinic on walk test w/out oxygen.          Current Outpatient Medications Ordered in Epic   Medication Sig Dispense Refill    albuterol 108 (90 Base) MCG/ACT Aero Soln inhalation aerosol Inhale 2 Puffs every 6 hours as needed for Shortness of Breath. 3 Each 3    ipratropium-albuterol (DUONEB) 0.5-2.5 (3) MG/3ML nebulizer solution Take 3 mL by nebulization 4 times a day. 1 Each 3    Fluticasone Furoate-Vilanterol (BREO ELLIPTA) 100-25 MCG/INH AEROSOL POWDER, BREATH ACTIVATED Inhale  "1 Puff every day. Rinse mouth after use. 1 Each 3    guaiFENesin dextromethorphan (ROBITUSSIN DM) 100-10 MG/5ML Syrup syrup Take 10 mL by mouth every 6 hours as needed for Cough. 840 mL     Ascorbic Acid (VITAMIN C) 1000 MG Tab Take 1,000 mg by mouth.      multivitamin (THERAGRAN) Tab Take 1 Tablet by mouth every day.      cyanocobalamin (VITAMIN B12) 1000 MCG Tab Take 1,000 mcg by mouth every day.      vitamin D3 (CHOLECALCIFEROL) 1000 Unit (25 mcg) Tab Take 1,000 Units by mouth every day.      vitamin e (VITAMIN E) 400 UNIT Cap Take 400 Units by mouth every day.      calcium citrate (CALCITRATE) 950 (200 Ca) MG Tab Take 950 mg by mouth every day.      fluticasone (FLONASE) 50 MCG/ACT nasal spray Administer 2 Sprays into affected nostril(S) every day.      loratadine (CLARITIN) 10 MG Tab Take 10 mg by mouth every day.       No current [facility]-ordered facility-administered medications on file.       Health Maintenance: Completed      Objective:     Exam:  /68 (BP Location: Left arm, Patient Position: Sitting, BP Cuff Size: Adult)   Pulse 84   Temp 36.6 °C (97.9 °F) (Temporal)   Resp 16   Ht 1.651 m (5' 5\")   Wt 84.4 kg (186 lb)   SpO2 94%   BMI 30.95 kg/m²  Body mass index is 30.95 kg/m².    Physical Exam  Vitals reviewed.   Constitutional:       General: She is not in acute distress.     Appearance: Normal appearance.   Eyes:      Conjunctiva/sclera: Conjunctivae normal.      Pupils: Pupils are equal, round, and reactive to light.   Cardiovascular:      Rate and Rhythm: Normal rate and regular rhythm.      Pulses: Normal pulses.      Heart sounds: Normal heart sounds. No murmur heard.  Pulmonary:      Effort: Pulmonary effort is normal. No respiratory distress.      Breath sounds: Normal breath sounds. No stridor. No wheezing, rhonchi or rales.      Comments: Rhonchi heard in upper posterior lobes cleared with coughing  Chest:      Chest wall: No tenderness.   Abdominal:      General: Bowel sounds are " normal.   Musculoskeletal:      Right lower leg: No edema.      Left lower leg: No edema.   Neurological:      Mental Status: She is alert and oriented to person, place, and time.   Psychiatric:         Mood and Affect: Mood normal.         Behavior: Behavior normal.        A chaperone was offered to the patient during today's exam. Chaperone name: daughter was present.      Assessment & Plan:     76 y.o. female with the following -     1. Need for vaccination  - INFLUENZA VACCINE, HIGH DOSE (65+ ONLY)    2. Chronic obstructive pulmonary disease, unspecified COPD type (HCC)  Chronic, stable however she would like to cut back on the amount of albuterol she is using because it is making her feel jittery, headaches, dry mouth and shaky.  She has been using her rescue inhaler multiple times daily as she felt that she was supposed to use it on a more regimented basis.  Discussed doing her Breo daily as well as her albuterol nebulizer treatments at least twice daily and up to 4 if she is wheezing or having shortness of breath.  Discussed her rescue inhaler as if she is having chest pain or shortness of breath on an as-needed basis.  Followed by pulmonary  3. Other constipation  Chronic, continue with stool softeners and increasing her hydration to 60 to 80 ounces of fluid daily.  Also encouraged her to increase her fruit and vegetable intake for fiber.  4. Encounter for screening mammogram for malignant neoplasm of breast  - MA-SCREENING MAMMO BILAT W/TOMOSYNTHESIS W/CAD; Future    5. Elevated glucose  Chronic, will recheck A1c prior to next visit.  Discussed dietary and lifestyle changes to lower her blood sugar levels.  - HEMOGLOBIN A1C; Future    6. Dyslipidemia, goal LDL below 100  Chronic, discussed dietary and lifestyle changes.  - Lipid Profile; Future    7. Screening for osteoporosis  - DS-BONE DENSITY STUDY (DEXA); Future       I spent a total of 36 minutes with record review, exam, communication with the  patient, communication with other providers, and documentation of this encounter.      Return in about 6 months (around 3/21/2023).    Please note that this dictation was created using voice recognition software. I have made every reasonable attempt to correct obvious errors, but I expect that there are errors of grammar and possibly content that I did not discover before finalizing the note.

## 2022-09-21 NOTE — PATIENT INSTRUCTIONS
[facility]  [address], [address]  [place] [address]  Phone: [phone]

## 2022-09-21 NOTE — LETTER
[facility]  SANIYA Justice  [address]  [place] [facility] [address]  Fax: [phone]   Authorization for Release/Disclosure of   Protected Health Information   Name: NICOLA CAST : 1945 SSN: [id number]   Address: [facility] [address] [address]  [place] [address] Phone:    [phone] (home) [phone] (work)   I authorize the entity listed below to release/disclose the PHI below to:   [facility]/SANIYA Justice and SANIYA Justice   Provider or Entity Name:  [facility]   Address   City, [facility], Zip:               [address] [place] [address]   Phone:  [phone]      Fax:      [phone]        Reason for request: continuity of care   Information to be released:    [ X ] LAST COLONOSCOPY,  including any PATH REPORT and follow-up  [ X ] LAST FIT/COLOGUARD RESULT [  ] LAST DEXA  [  ] LAST MAMMOGRAM  [  ] LAST PAP  [  ] LAST LABS [  ] RETINA EXAM REPORT  [  ] IMMUNIZATION RECORDS  [  ] Release all info      [  ] Check here and initial the line next to each item to release ALL health information INCLUDING  _____ Care and treatment for drug and / or alcohol abuse  _____ HIV testing, infection status, or AIDS  _____ Genetic Testing    DATES OF SERVICE OR TIME PERIOD TO BE DISCLOSED: _____________  I understand and acknowledge that:  * This Authorization may be revoked at any time by you in writing, except if your health information has already been used or disclosed.  * Your health information that will be used or disclosed as a result of you signing this authorization could be re-disclosed by the recipient. If this occurs, your re-disclosed health information may no longer be protected by State or Federal laws.  * You may refuse to sign this Authorization. Your refusal will not affect your ability to obtain treatment.  * This Authorization becomes effective upon signing and will  on (date) __________.      If no date is indicated,  this Authorization will  one (1) year from the signature date.    Name: Patrica Candice Uyen    Signature:   Date:     2022       PLEASE FAX REQUESTED RECORDS BACK TO: [phone]

## 2022-10-11 ENCOUNTER — PATIENT OUTREACH (OUTPATIENT)
Dept: HEALTH INFORMATION MANAGEMENT | Facility: OTHER | Age: 77
End: 2022-10-11
Payer: MEDICARE

## 2022-10-11 DIAGNOSIS — J44.9 CHRONIC OBSTRUCTIVE PULMONARY DISEASE, UNSPECIFIED COPD TYPE (HCC): ICD-10-CM

## 2022-10-11 DIAGNOSIS — J45.901 ASTHMA WITH ACUTE EXACERBATION, UNSPECIFIED ASTHMA SEVERITY, UNSPECIFIED WHETHER PERSISTENT: ICD-10-CM

## 2022-10-11 PROCEDURE — 99490 CHRNC CARE MGMT STAFF 1ST 20: CPT | Performed by: FAMILY MEDICINE

## 2022-10-11 NOTE — PROGRESS NOTES
"Nurse CM outreach call for monthly CCM assessment. Pt reports she has been doing fair. Reports she has been having muscle pain in her right side hip area. Reports \"it's muscle pain\". Pt reports the pain is intermittent and will extend down to her right knee at times. Reports sometimes pain is worse if she sits too long, or if she is standing too much. Pt reports pain has been going on for the past 1-2 months. Nurse offered patient appointment to see PCP tomorrow. Pt would like appointment for evaluation. Nurse scheduled pt for 10/12 at 3:20 pm.     Nurse reviewed current care plan for CCM. Pt reports no falls over the past month. Reports she is doing her albuterol treatments if she is feeling short of breath. States the albuterol makes her feel shaky. States she is only doing a treatment if she feels short of breath or wheezing. Pt states she isn't coughing much today. Reports she continues to wear oxygen at 3 liters at night. Patient states she checks her pulse oximetry readings and readings have been good. States she was 94% today before going out shopping.     Patient reports she would like to increase her strength and endurance. Reports she does some stretching activities at night. Pt is trying to keep active and has been doing some gardening. Pt will continue to do activities as tolerated including stretching, walking and gardening. Pt reports she has all of her resources for care. Pt will contact nurse LYDIA if any concerns.   "

## 2022-10-12 ENCOUNTER — OFFICE VISIT (OUTPATIENT)
Dept: MEDICAL GROUP | Facility: PHYSICIAN GROUP | Age: 77
End: 2022-10-12
Payer: MEDICARE

## 2022-10-12 VITALS
WEIGHT: 182 LBS | HEART RATE: 74 BPM | RESPIRATION RATE: 18 BRPM | TEMPERATURE: 98 F | BODY MASS INDEX: 30.32 KG/M2 | HEIGHT: 65 IN | OXYGEN SATURATION: 96 % | DIASTOLIC BLOOD PRESSURE: 68 MMHG | SYSTOLIC BLOOD PRESSURE: 122 MMHG

## 2022-10-12 DIAGNOSIS — M54.16 LUMBAR BACK PAIN WITH RADICULOPATHY AFFECTING RIGHT LOWER EXTREMITY: ICD-10-CM

## 2022-10-12 DIAGNOSIS — R73.03 PRE-DIABETES: ICD-10-CM

## 2022-10-12 PROCEDURE — 99213 OFFICE O/P EST LOW 20 MIN: CPT | Performed by: FAMILY MEDICINE

## 2022-10-12 ASSESSMENT — FIBROSIS 4 INDEX: FIB4 SCORE: 0.98

## 2022-10-12 NOTE — PROGRESS NOTES
Subjective:     CC:   Chief Complaint   Patient presents with    Pain     Between R hip and L, burning feeling, x 3 months        HPI:   Patrica presents today with  c/o pain between her right hip and knee x 3 months. Reports at night if she has been on her feet too long she gets burning pain and numbness in her thigh, mostly near the knee. Says for a year off and on she has had numbness in her bilateral feet. She has been pre diabetic for 40 years. [place] and had an incident with a goat that she feels that may have caused low back issues.   She has tried muscle rub, stretches, and tylenol or ibuprofen at night prior to bedtime. States massages her right lower back and this improves her thigh burning.   Denies fever, chills, recent falls   + for low back pain, foot dropping with ambulation,   Good strength in legs bilaterally and good ROM.   No problem-specific Assessment & Plan notes found for this encounter.      Current Outpatient Medications Ordered in Epic   Medication Sig Dispense Refill    albuterol 108 (90 Base) MCG/ACT Aero Soln inhalation aerosol Inhale 2 Puffs every 6 hours as needed for Shortness of Breath. 3 Each 3    ipratropium-albuterol (DUONEB) 0.5-2.5 (3) MG/3ML nebulizer solution Take 3 mL by nebulization 4 times a day. (Patient taking differently: Take 3 mL by nebulization 2 times a day.) 1 Each 3    Fluticasone Furoate-Vilanterol (BREO ELLIPTA) 100-25 MCG/INH AEROSOL POWDER, BREATH ACTIVATED Inhale 1 Puff every day. Rinse mouth after use. 1 Each 3    guaiFENesin dextromethorphan (ROBITUSSIN DM) 100-10 MG/5ML Syrup syrup Take 10 mL by mouth every 6 hours as needed for Cough. 840 mL     Ascorbic Acid (VITAMIN C) 1000 MG Tab Take 1,000 mg by mouth.      multivitamin (THERAGRAN) Tab Take 1 Tablet by mouth every day.      cyanocobalamin (VITAMIN B12) 1000 MCG Tab Take 1,000 mcg by mouth every day.      vitamin D3 (CHOLECALCIFEROL) 1000 Unit (25 mcg) Tab Take 1,000 Units by mouth  "every day.      vitamin e (VITAMIN E) 400 UNIT Cap Take 400 Units by mouth every day.      calcium citrate (CALCITRATE) 950 (200 Ca) MG Tab Take 950 mg by mouth every day.      fluticasone (FLONASE) 50 MCG/ACT nasal spray Administer 2 Sprays into affected nostril(S) every day.      loratadine (CLARITIN) 10 MG Tab Take 10 mg by mouth every day.       No current [facility]-ordered facility-administered medications on file.       Health Maintenance: Completed    Melaluca muscle rub   Objective:     Exam:  /68 (BP Location: Left arm, Patient Position: Sitting, BP Cuff Size: Adult)   Pulse 74   Temp 36.7 °C (98 °F) (Temporal)   Resp 18   Ht 1.651 m (5' 5\")   Wt 82.6 kg (182 lb)   SpO2 96% Comment: 3l  BMI 30.29 kg/m²  Body mass index is 30.29 kg/m².    Physical Exam  Vitals reviewed.   Constitutional:       General: She is not in acute distress.     Appearance: Normal appearance.   Eyes:      Conjunctiva/sclera: Conjunctivae normal.      Pupils: Pupils are equal, round, and reactive to light.   Cardiovascular:      Rate and Rhythm: Normal rate.      Pulses: Normal pulses.   Pulmonary:      Effort: Pulmonary effort is normal.   Musculoskeletal:      Lumbar back: Tenderness present. No bony tenderness. Normal range of motion.      Comments: Tenderness in right lower lumbar area, near SI joint    Skin:     General: Skin is warm.   Neurological:      Mental Status: She is alert and oriented to person, place, and time.   Psychiatric:         Mood and Affect: Mood normal.         Behavior: Behavior normal.        A chaperone was offered to the patient during today's exam.Dorinda CARRILLO student      Assessment & Plan:     76 y.o. female with the following -     1. Pre-diabetes  Chronic, most recent A1c is 6.2 however her long-term elevated blood sugars may have caused her bilateral neuropathy, however she states that her right thigh burning and nerve pain appears to be a separate issue.  2. Lumbar back pain with radiculopathy " affecting right lower extremity  Chronic, lower lumbar back pain after an accident with ago [place] however now her pain is more on her right side paravertebrally along her SI joint, discussed stretches and exercises that she can do to help with this.  She declines a referral to PT, physiatry or medication management such as gabapentin at this time.  She will obtain x-ray imaging to look for any abnormalities and then try some decompressive exercises  to help relieve nerve pain prior to trying any other modalities at this time.  - DX-SACROILIAC JOINTS 3+; Future  - DX-LUMBAR SPINE-2 OR 3 VIEWS; Future     I spent a total of 25 minutes with record review, exam, communication with the patient, communication with other providers, and documentation of this encounter.      No follow-ups on file.    Please note that this dictation was created using voice recognition software. I have made every reasonable attempt to correct obvious errors, but I expect that there are errors of grammar and possibly content that I did not discover before finalizing the note.

## 2022-10-12 NOTE — PATIENT INSTRUCTIONS
Constipation is a fairly common problem, and fortunately there are many good treatments available.  Listed below are things you can do to help with this issue.  I recommend you start with the first suggestion listed, and if that is not enough to help then keep working your way down the list until you get to the point where you are having normal bowel movements, then try backing off down the list to see if you can maintain normal bowel movements with something less aggressive.    1)  Increasing water intake to about 80 ounces a day (a bit more than a half gallon of water) can help.  In addition, regular exercise can make an enormous difference besides being generally good for you anyway.  Making sure your diet includes plenty of fruits and vegetables is also very helpful.    2)  Adding additional fiber to your diet with products like metamucil, benefiber, citrucel, or psyllium can help by adding bulk to your stool, keeping it from getting quite so packed down.    3) Polyethylene glycol (Miralax or its generic equivalent) is an osmotic laxative, meaning it brings extra water into your colon, helping keep your stool from getting hard and packed down.  One capful a day should give normal soft stool within about 2-3 days.  If not, consider trying 1 capful twice a day.    4) Milk of magnesia (30 ml daily) or magnesium citrate (1/2 to 1 bottle daily), or a bisacodyl (Dulcolax) suppository can be used next to get things moving.    5) If this has been ineffective, using Senna-S, 1-2 tablets one to two times daily can be helpful.    6) If all these measures have been ineffective, you can try a mineral oil enema or a warm tap water enema to see if this helps things.

## 2022-10-18 ENCOUNTER — APPOINTMENT (OUTPATIENT)
Dept: RADIOLOGY | Facility: MEDICAL CENTER | Age: 77
End: 2022-10-18
Attending: FAMILY MEDICINE
Payer: MEDICARE

## 2022-10-18 DIAGNOSIS — M54.16 LUMBAR BACK PAIN WITH RADICULOPATHY AFFECTING RIGHT LOWER EXTREMITY: ICD-10-CM

## 2022-10-18 PROCEDURE — 72202 X-RAY EXAM SI JOINTS 3/> VWS: CPT

## 2022-10-18 PROCEDURE — 72100 X-RAY EXAM L-S SPINE 2/3 VWS: CPT

## 2022-10-26 ENCOUNTER — HOSPITAL ENCOUNTER (OUTPATIENT)
Dept: RADIOLOGY | Facility: MEDICAL CENTER | Age: 77
End: 2022-10-26
Attending: FAMILY MEDICINE
Payer: MEDICARE

## 2022-10-26 DIAGNOSIS — Z13.820 SCREENING FOR OSTEOPOROSIS: ICD-10-CM

## 2022-10-26 PROCEDURE — 77080 DXA BONE DENSITY AXIAL: CPT

## 2022-11-02 ENCOUNTER — DOCUMENTATION (OUTPATIENT)
Dept: HEALTH INFORMATION MANAGEMENT | Facility: OTHER | Age: 77
End: 2022-11-02
Payer: MEDICARE

## 2022-11-02 ENCOUNTER — PATIENT MESSAGE (OUTPATIENT)
Dept: HEALTH INFORMATION MANAGEMENT | Facility: OTHER | Age: 77
End: 2022-11-02

## 2022-11-07 ENCOUNTER — TELEPHONE (OUTPATIENT)
Dept: HEALTH INFORMATION MANAGEMENT | Facility: OTHER | Age: 77
End: 2022-11-07
Payer: MEDICARE

## 2022-11-09 ENCOUNTER — PATIENT MESSAGE (OUTPATIENT)
Dept: HEALTH INFORMATION MANAGEMENT | Facility: OTHER | Age: 77
End: 2022-11-09

## 2022-11-09 ENCOUNTER — PATIENT OUTREACH (OUTPATIENT)
Dept: HEALTH INFORMATION MANAGEMENT | Facility: OTHER | Age: 77
End: 2022-11-09
Payer: MEDICARE

## 2022-11-09 DIAGNOSIS — R73.03 PRE-DIABETES: ICD-10-CM

## 2022-11-09 DIAGNOSIS — J45.901 ASTHMA WITH ACUTE EXACERBATION, UNSPECIFIED ASTHMA SEVERITY, UNSPECIFIED WHETHER PERSISTENT: ICD-10-CM

## 2022-11-09 PROCEDURE — 99999 PR NO CHARGE: CPT | Performed by: FAMILY MEDICINE

## 2022-11-10 PROBLEM — E66.811 OBESITY (BMI 30.0-34.9): Status: ACTIVE | Noted: 2022-11-10

## 2022-11-10 PROBLEM — E66.9 OBESITY (BMI 30.0-34.9): Status: ACTIVE | Noted: 2022-11-10

## 2022-11-10 PROBLEM — I73.9 PAD (PERIPHERAL ARTERY DISEASE) (HCC): Status: ACTIVE | Noted: 2022-11-10

## 2022-11-10 PROBLEM — G62.9 NEUROPATHY: Status: ACTIVE | Noted: 2022-11-10

## 2022-11-10 PROBLEM — M81.0 OSTEOPOROSIS WITHOUT CURRENT PATHOLOGICAL FRACTURE: Status: ACTIVE | Noted: 2022-11-10

## 2022-11-10 PROBLEM — M54.50 CHRONIC LOW BACK PAIN: Status: ACTIVE | Noted: 2022-11-10

## 2022-11-10 PROBLEM — G89.29 CHRONIC LOW BACK PAIN: Status: ACTIVE | Noted: 2022-11-10

## 2022-11-10 PROBLEM — E78.5 DYSLIPIDEMIA: Status: ACTIVE | Noted: 2022-11-10

## 2022-11-10 PROBLEM — J96.10 CHRONIC RESPIRATORY FAILURE (HCC): Status: ACTIVE | Noted: 2022-11-10

## 2022-11-16 ENCOUNTER — HOSPITAL ENCOUNTER (OUTPATIENT)
Dept: RADIOLOGY | Facility: MEDICAL CENTER | Age: 77
End: 2022-11-16
Attending: FAMILY MEDICINE
Payer: MEDICARE

## 2022-11-16 DIAGNOSIS — Z12.31 ENCOUNTER FOR SCREENING MAMMOGRAM FOR MALIGNANT NEOPLASM OF BREAST: ICD-10-CM

## 2022-11-16 PROCEDURE — 77063 BREAST TOMOSYNTHESIS BI: CPT

## 2022-11-17 ENCOUNTER — OFFICE VISIT (OUTPATIENT)
Dept: MEDICAL GROUP | Facility: PHYSICIAN GROUP | Age: 77
End: 2022-11-17
Payer: MEDICARE

## 2022-11-17 VITALS
TEMPERATURE: 98.2 F | WEIGHT: 188.6 LBS | BODY MASS INDEX: 32.2 KG/M2 | HEIGHT: 64 IN | SYSTOLIC BLOOD PRESSURE: 118 MMHG | HEART RATE: 69 BPM | OXYGEN SATURATION: 95 % | DIASTOLIC BLOOD PRESSURE: 68 MMHG

## 2022-11-17 DIAGNOSIS — M81.0 AGE-RELATED OSTEOPOROSIS WITHOUT CURRENT PATHOLOGICAL FRACTURE: ICD-10-CM

## 2022-11-17 DIAGNOSIS — J44.9 CHRONIC OBSTRUCTIVE PULMONARY DISEASE, UNSPECIFIED COPD TYPE (HCC): ICD-10-CM

## 2022-11-17 DIAGNOSIS — M54.50 CHRONIC LOW BACK PAIN, UNSPECIFIED BACK PAIN LATERALITY, UNSPECIFIED WHETHER SCIATICA PRESENT: ICD-10-CM

## 2022-11-17 DIAGNOSIS — J96.11 CHRONIC RESPIRATORY FAILURE WITH HYPOXIA (HCC): ICD-10-CM

## 2022-11-17 DIAGNOSIS — G89.29 CHRONIC LOW BACK PAIN, UNSPECIFIED BACK PAIN LATERALITY, UNSPECIFIED WHETHER SCIATICA PRESENT: ICD-10-CM

## 2022-11-17 PROCEDURE — 99214 OFFICE O/P EST MOD 30 MIN: CPT | Performed by: FAMILY MEDICINE

## 2022-11-17 ASSESSMENT — FIBROSIS 4 INDEX: FIB4 SCORE: 0.98

## 2022-11-17 NOTE — ASSESSMENT & PLAN NOTE
Chronic, had been using her oxygen continuously. She still carries her portable oxygen tanks if she needs it however she is only needing with the activity outside or when walking around stores for prolonged periods of time. She feels it would be more cost effective if she had an [employer]ogen concentrator and palafox snot have a lot of space for tanks in her 32 ft trailer and no space to store them, feels it would be safer for oxygen portable concentrator. Will send updated orders for this .

## 2022-11-17 NOTE — ASSESSMENT & PLAN NOTE
New problem, previous DEXA scan done in 2007 which showed osteopenia in her lumbar spine, which has now progressed to osteoporosis in the lumbar spine.  Her left femur remains normal bone mineral density.  Results below.  She is open to medication options for her osteoporosis, she is needing to get an updated dental exam and x-rays prior to starting treatment.  Discussed getting her updated dental exam and x-rays and following up with her new PCP in February for treatment regarding her osteoporosis.  11/22:   The lumbar spine has a mean bone mineral density of 0.876 g/cm2, with a T score of -2.5 and a Z score of -1.3.     The proximal left femur has a mean bone mineral density of 0.946 g/cm2, with a T score of -0.5 and a Z score of 0.9.     When compared with the most recent study dated 4/9/2007, there has been a 11.8% decrease in the bone mineral density of the proximal left femur.     According to the World Health Organization classification, bone mineral density of this patient is osteoporotic in the spine and normal in the proximal left femur.     10-year Probability of Fracture:  Major Osteoporotic     11.1%  Hip     2.1%  Population      [place] ()     Based on left femur neck BMD

## 2022-11-17 NOTE — ASSESSMENT & PLAN NOTE
Chronic, she is reestablishing with pulmonary later this month.  Her current medication regimen is DuoNeb 3 mL 2 times daily as needed, causing her side effects when she takes it too often, Ventolin inhaler as needed, Robitussin-DM cough syrup at night as needed for coughing and congestion, Breo elliptica 100-25 mcg elation aerosol 1 puff daily, Flonase 2 sprays daily, and daily over-the-counter antihistamine such as Claritin.  She states that she feels that her COPD is exacerbated by allergies. She does not like to take Duoneb 4 times daily due to side effects such as muscle cramps, palpitations, or anxiousness.   Currently not needing oxygen 24/7, off today in clinic and 95% on RA with rest.   She wanting us to try to get an order for an oxygen concentrator that is portable, requesting [employer].

## 2022-11-17 NOTE — PROGRESS NOTES
Subjective:     CC:   Chief Complaint   Patient presents with    Medication Refill    Results     imaging       HPI:   Patrica presents today with follow up on her dexa scan results and imaging of her sacroiliac joints and lower lumbar spine.     Chronic respiratory failure (HCC)  Chronic, had been using her oxygen continuously. She still carries her portable oxygen tanks if she needs it however she is only needing with the activity outside or when walking around stores for prolonged periods of time. She feels it would be more cost effective if she had an inogen concentrator and palafox snot have a lot of space for tanks in her 32 ft trailer and no space to store them, feels it would be safer for oxygen portable concentrator. Will send updated orders for this .    Chronic obstructive pulmonary disease (HCC)  Chronic, she is reestablishing with pulmonary later this month.  Her current medication regimen is DuoNeb 3 mL 2 times daily as needed, causing her side effects when she takes it too often, Ventolin inhaler as needed, Robitussin-DM cough syrup at night as needed for coughing and congestion, Breo elliptica 100-25 mcg elation aerosol 1 puff daily, Flonase 2 sprays daily, and daily over-the-counter antihistamine such as Claritin.  She states that she feels that her COPD is exacerbated by allergies. She does not like to take Duoneb 4 times daily due to side effects such as muscle cramps, palpitations, or anxiousness.   Currently not needing oxygen 24/7, off today in clinic and 95% on RA with rest.   She wanting us to try to get an order for an oxygen concentrator that is portable, requesting [employer].    Osteoporosis without current pathological fracture  New problem, previous DEXA scan done in 2007 which showed osteopenia in her lumbar spine, which has now progressed to osteoporosis in the lumbar spine.  Her left femur remains normal bone mineral density.  Results below.  She is open to medication options for her  osteoporosis, she is needing to get an updated dental exam and x-rays prior to starting treatment.  Discussed getting her updated dental exam and x-rays and following up with her new PCP in February for treatment regarding her osteoporosis.  11/22:   The lumbar spine has a mean bone mineral density of 0.876 g/cm2, with a T score of -2.5 and a Z score of -1.3.     The proximal left femur has a mean bone mineral density of 0.946 g/cm2, with a T score of -0.5 and a Z score of 0.9.     When compared with the most recent study dated 4/9/2007, there has been a 11.8% decrease in the bone mineral density of the proximal left femur.     According to the World Health Organization classification, bone mineral density of this patient is osteoporotic in the spine and normal in the proximal left femur.     10-year Probability of Fracture:  Major Osteoporotic     11.1%  Hip     2.1%  Population      [place] ()     Based on left femur neck BMD      Current Outpatient Medications Ordered in Epic   Medication Sig Dispense Refill    DIGESTIVE AIDS MIXTURE PO Take  by mouth.      albuterol 108 (90 Base) MCG/ACT Aero Soln inhalation aerosol Inhale 2 Puffs every 6 hours as needed for Shortness of Breath. 3 Each 3    ipratropium-albuterol (DUONEB) 0.5-2.5 (3) MG/3ML nebulizer solution Take 3 mL by nebulization 4 times a day. (Patient taking differently: Take 3 mL by nebulization 2 times a day.) 1 Each 3    Fluticasone Furoate-Vilanterol (BREO ELLIPTA) 100-25 MCG/INH AEROSOL POWDER, BREATH ACTIVATED Inhale 1 Puff every day. Rinse mouth after use. 1 Each 3    guaiFENesin dextromethorphan (ROBITUSSIN DM) 100-10 MG/5ML Syrup syrup Take 10 mL by mouth every 6 hours as needed for Cough. 840 mL     Ascorbic Acid (VITAMIN C) 1000 MG Tab Take 1 Tablet by mouth.      multivitamin (THERAGRAN) Tab Take 1 Tablet by mouth every day.      cyanocobalamin (VITAMIN B12) 1000 MCG Tab Take 1 Tablet by mouth every day.      vitamin D3 (CHOLECALCIFEROL)  "1000 Unit (25 mcg) Tab Take 1 Tablet by mouth every day.      vitamin e (VITAMIN E) 400 UNIT Cap Take 1 Capsule by mouth every day.      calcium citrate (CALCITRATE) 950 (200 Ca) MG Tab Take 1 Tablet by mouth every day.      fluticasone (FLONASE) 50 MCG/ACT nasal spray Administer 2 Sprays into affected nostril(S) every day.      loratadine (CLARITIN) 10 MG Tab Take 1 Tablet by mouth every day.       No current [facility]-ordered facility-administered medications on file.       Health Maintenance: Completed      Objective:     Exam:  /68 (BP Location: Left arm, Patient Position: Sitting, BP Cuff Size: Adult)   Pulse 69   Temp 36.8 °C (98.2 °F) (Temporal)   Ht 1.626 m (5' 4\")   Wt 85.5 kg (188 lb 9.6 oz)   SpO2 95%   BMI 32.37 kg/m²  Body mass index is 32.37 kg/m².    Physical Exam  Vitals reviewed.   Constitutional:       General: She is not in acute distress.     Appearance: Normal appearance.   Eyes:      Conjunctiva/sclera: Conjunctivae normal.      Pupils: Pupils are equal, round, and reactive to light.   Cardiovascular:      Rate and Rhythm: Normal rate and regular rhythm.      Pulses: Normal pulses.      Heart sounds: Normal heart sounds. No murmur heard.  Pulmonary:      Effort: Pulmonary effort is normal. No respiratory distress.      Breath sounds: Normal breath sounds. No stridor. No wheezing, rhonchi or rales.   Chest:      Chest wall: No tenderness.   Musculoskeletal:      Right lower leg: No edema.      Left lower leg: No edema.   Skin:     General: Skin is warm.   Neurological:      Mental Status: She is alert and oriented to person, place, and time.   Psychiatric:         Mood and Affect: Mood normal.         Behavior: Behavior normal.        A chaperone was offered to the patient during today's exam. Patient declined chaperone.        Assessment & Plan:     76 y.o. female with the following -     1. Age-related osteoporosis without current pathological fracture  New problem, osteoporosis in " lower lumbar spine.  She also had recent x-rays of the lower lumbar spine that did show slipping forward of the vertebrae and degenerative changes.  Low back pain currently controlled with over-the-counter remedies, declines physiatry or physical therapy at this time.  Follow-up with new PCP next year after she is able to get an updated dental exam and x-rays for further discussion on starting medication management.  2. Chronic respiratory failure with hypoxia (HCC)  Chronic, stable requesting portable concentrator as it would be easier for her to store she lives in a small trailer as well as safer for her.  3. Chronic low back pain, unspecified back pain laterality, unspecified whether sciatica present  Chronic, discussed x-ray images of her lower lumbar spine which did show some degenerative changes and slipping forward of her lower lumbar spine vertebra.  We also discussed she has osteoporosis in her lower lumbar spine which could contribute to her low back pain.  She is interested in medication management after she gets an updated oral exam and x-rays done.  She declines physiatry or physical therapy at this time and continues to do chair yoga which is helpful.  4. Chronic obstructive pulmonary disease, unspecified COPD type (HCC)   Chronic, stable and followed by pulmonary, continue on current medication regimen.    I spent a total of 31 minutes with record review, exam, communication with the patient, communication with other providers, and documentation of this encounter.      No follow-ups on file.    Please note that this dictation was created using voice recognition software. I have made every reasonable attempt to correct obvious errors, but I expect that there are errors of grammar and possibly content that I did not discover before finalizing the note.

## 2022-11-17 NOTE — PATIENT INSTRUCTIONS
Get dental exam done and then when you see Dr. Trujillo in February she can discuss osteoporosis medication options with you

## 2022-12-05 ENCOUNTER — OFFICE VISIT (OUTPATIENT)
Dept: SLEEP MEDICINE | Facility: MEDICAL CENTER | Age: 77
End: 2022-12-05
Payer: MEDICARE

## 2022-12-05 VITALS
HEART RATE: 85 BPM | WEIGHT: 189 LBS | BODY MASS INDEX: 32.44 KG/M2 | SYSTOLIC BLOOD PRESSURE: 122 MMHG | DIASTOLIC BLOOD PRESSURE: 70 MMHG | OXYGEN SATURATION: 95 %

## 2022-12-05 DIAGNOSIS — J45.50 SEVERE PERSISTENT ASTHMA WITHOUT COMPLICATION: ICD-10-CM

## 2022-12-05 DIAGNOSIS — Z87.891 HX OF SMOKING: ICD-10-CM

## 2022-12-05 PROCEDURE — 99215 OFFICE O/P EST HI 40 MIN: CPT | Performed by: INTERNAL MEDICINE

## 2022-12-05 RX ORDER — FLUTICASONE FUROATE AND VILANTEROL 200; 25 UG/1; UG/1
1 POWDER RESPIRATORY (INHALATION) DAILY
Qty: 3 EACH | Refills: 3 | Status: SHIPPED | OUTPATIENT
Start: 2022-12-05 | End: 2024-02-20

## 2022-12-05 ASSESSMENT — FIBROSIS 4 INDEX: FIB4 SCORE: 0.98
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
## 2022-12-05 NOTE — PATIENT INSTRUCTIONS
Please look up [employer] to help limit the cost of your inhalers. The website is: [email or web address]  Your refills for Breo have been sent to [facility].

## 2022-12-12 ENCOUNTER — PATIENT OUTREACH (OUTPATIENT)
Dept: HEALTH INFORMATION MANAGEMENT | Facility: OTHER | Age: 77
End: 2022-12-12
Payer: MEDICARE

## 2022-12-12 DIAGNOSIS — J44.9 CHRONIC OBSTRUCTIVE PULMONARY DISEASE, UNSPECIFIED COPD TYPE (HCC): ICD-10-CM

## 2022-12-12 NOTE — PROGRESS NOTES
Nurse CM outreach call for monthly CCM assessment. Nurse spoke to patient and reviewed care plan. Pt reports she has been doing well. States she had follow-up with pulmonary. Pt is scheduled to go to pulmonary rehab next week. Reports no current problems. Denies cough or respiratory problems. States she does get short of breath with exertion. Pt reports she has all of her medications. Pt scheduled to see new PCP 2/9/23. Pt aware of this appointment date and time. Pt has no additional questions or concerns at this time. Pt will contact nurse LYDIA if needing assistance.

## 2022-12-20 ENCOUNTER — HOSPITAL ENCOUNTER (OUTPATIENT)
Dept: PULMONOLOGY | Facility: MEDICAL CENTER | Age: 77
End: 2022-12-20
Attending: INTERNAL MEDICINE
Payer: MEDICARE

## 2022-12-20 PROCEDURE — G0237 THERAPEUTIC PROCD STRG ENDUR: HCPCS

## 2022-12-20 PROCEDURE — 99211 OFF/OP EST MAY X REQ PHY/QHP: CPT | Mod: 25

## 2023-01-04 ENCOUNTER — HOSPITAL ENCOUNTER (OUTPATIENT)
Dept: PULMONOLOGY | Facility: MEDICAL CENTER | Age: 78
End: 2023-01-04
Attending: INTERNAL MEDICINE
Payer: MEDICARE

## 2023-01-04 PROCEDURE — G0237 THERAPEUTIC PROCD STRG ENDUR: HCPCS | Mod: XU

## 2023-01-04 PROCEDURE — G0238 OTH RESP PROC, INDIV: HCPCS | Mod: XU

## 2023-01-04 PROCEDURE — G0239 OTH RESP PROC, GROUP: HCPCS

## 2023-01-09 ENCOUNTER — HOSPITAL ENCOUNTER (OUTPATIENT)
Dept: PULMONOLOGY | Facility: MEDICAL CENTER | Age: 78
End: 2023-01-09
Attending: INTERNAL MEDICINE
Payer: MEDICARE

## 2023-01-09 PROCEDURE — G0239 OTH RESP PROC, GROUP: HCPCS

## 2023-01-09 PROCEDURE — G0238 OTH RESP PROC, INDIV: HCPCS

## 2023-01-09 PROCEDURE — G0237 THERAPEUTIC PROCD STRG ENDUR: HCPCS

## 2023-01-11 ENCOUNTER — HOSPITAL ENCOUNTER (OUTPATIENT)
Dept: PULMONOLOGY | Facility: MEDICAL CENTER | Age: 78
End: 2023-01-11
Attending: INTERNAL MEDICINE
Payer: MEDICARE

## 2023-01-11 PROCEDURE — G0237 THERAPEUTIC PROCD STRG ENDUR: HCPCS | Mod: XU

## 2023-01-11 PROCEDURE — G0238 OTH RESP PROC, INDIV: HCPCS | Mod: XU

## 2023-01-11 PROCEDURE — G0239 OTH RESP PROC, GROUP: HCPCS

## 2023-01-16 ENCOUNTER — APPOINTMENT (OUTPATIENT)
Dept: PULMONOLOGY | Facility: MEDICAL CENTER | Age: 78
End: 2023-01-16
Attending: INTERNAL MEDICINE
Payer: MEDICARE

## 2023-01-17 ENCOUNTER — PATIENT OUTREACH (OUTPATIENT)
Dept: HEALTH INFORMATION MANAGEMENT | Facility: OTHER | Age: 78
End: 2023-01-17
Payer: MEDICARE

## 2023-01-17 DIAGNOSIS — J44.9 CHRONIC OBSTRUCTIVE PULMONARY DISEASE, UNSPECIFIED COPD TYPE (HCC): ICD-10-CM

## 2023-01-17 NOTE — PROGRESS NOTES
Nurse CM outreach call for monthly CCM assessment. Nurse spoke to patient and reviewed care plan. Pt reports she has been doing well. States she is going to pulmonary rehab. States she does continue to get short of breath at times with exertion. Reports no recent COPD exacerbations. Pt has all of her medications. Reports she had visit with pulmonary provider in December. Pt reports she has good support from her daughter. Pt aware of COPD exacerbation symptoms. Pt will be establishing with a new PCP at [facility] in February. Nurse reviewed upcoming appointments.

## 2023-01-18 ENCOUNTER — HOSPITAL ENCOUNTER (OUTPATIENT)
Dept: PULMONOLOGY | Facility: MEDICAL CENTER | Age: 78
End: 2023-01-18
Attending: INTERNAL MEDICINE
Payer: MEDICARE

## 2023-01-18 PROCEDURE — G0239 OTH RESP PROC, GROUP: HCPCS

## 2023-01-18 PROCEDURE — G0238 OTH RESP PROC, INDIV: HCPCS

## 2023-01-18 PROCEDURE — G0237 THERAPEUTIC PROCD STRG ENDUR: HCPCS

## 2023-01-23 ENCOUNTER — HOSPITAL ENCOUNTER (OUTPATIENT)
Dept: PULMONOLOGY | Facility: MEDICAL CENTER | Age: 78
End: 2023-01-23
Attending: INTERNAL MEDICINE
Payer: MEDICARE

## 2023-01-23 PROCEDURE — G0237 THERAPEUTIC PROCD STRG ENDUR: HCPCS

## 2023-01-23 PROCEDURE — G0239 OTH RESP PROC, GROUP: HCPCS

## 2023-01-23 PROCEDURE — G0238 OTH RESP PROC, INDIV: HCPCS

## 2023-01-25 ENCOUNTER — APPOINTMENT (OUTPATIENT)
Dept: PULMONOLOGY | Facility: MEDICAL CENTER | Age: 78
End: 2023-01-25
Attending: INTERNAL MEDICINE
Payer: MEDICARE

## 2023-01-30 ENCOUNTER — HOSPITAL ENCOUNTER (OUTPATIENT)
Dept: PULMONOLOGY | Facility: MEDICAL CENTER | Age: 78
End: 2023-01-30
Attending: INTERNAL MEDICINE
Payer: MEDICARE

## 2023-01-30 PROCEDURE — G0239 OTH RESP PROC, GROUP: HCPCS

## 2023-01-30 PROCEDURE — G0238 OTH RESP PROC, INDIV: HCPCS

## 2023-01-30 PROCEDURE — G0237 THERAPEUTIC PROCD STRG ENDUR: HCPCS

## 2023-02-01 ENCOUNTER — HOSPITAL ENCOUNTER (OUTPATIENT)
Dept: PULMONOLOGY | Facility: MEDICAL CENTER | Age: 78
End: 2023-02-01
Attending: INTERNAL MEDICINE
Payer: MEDICARE

## 2023-02-01 PROCEDURE — G0238 OTH RESP PROC, INDIV: HCPCS

## 2023-02-01 PROCEDURE — G0237 THERAPEUTIC PROCD STRG ENDUR: HCPCS

## 2023-02-01 PROCEDURE — G0239 OTH RESP PROC, GROUP: HCPCS

## 2023-02-06 ENCOUNTER — HOSPITAL ENCOUNTER (OUTPATIENT)
Dept: PULMONOLOGY | Facility: MEDICAL CENTER | Age: 78
End: 2023-02-06
Attending: INTERNAL MEDICINE
Payer: MEDICARE

## 2023-02-06 PROCEDURE — G0238 OTH RESP PROC, INDIV: HCPCS | Mod: XU

## 2023-02-06 PROCEDURE — G0239 OTH RESP PROC, GROUP: HCPCS

## 2023-02-06 PROCEDURE — G0237 THERAPEUTIC PROCD STRG ENDUR: HCPCS

## 2023-02-08 ENCOUNTER — HOSPITAL ENCOUNTER (OUTPATIENT)
Dept: PULMONOLOGY | Facility: MEDICAL CENTER | Age: 78
End: 2023-02-08
Attending: INTERNAL MEDICINE
Payer: MEDICARE

## 2023-02-08 PROCEDURE — G0237 THERAPEUTIC PROCD STRG ENDUR: HCPCS | Mod: XU

## 2023-02-08 PROCEDURE — G0239 OTH RESP PROC, GROUP: HCPCS

## 2023-02-08 PROCEDURE — G0238 OTH RESP PROC, INDIV: HCPCS

## 2023-02-09 ENCOUNTER — OFFICE VISIT (OUTPATIENT)
Dept: MEDICAL GROUP | Facility: PHYSICIAN GROUP | Age: 78
End: 2023-02-09
Payer: MEDICARE

## 2023-02-09 VITALS
SYSTOLIC BLOOD PRESSURE: 124 MMHG | WEIGHT: 196.5 LBS | OXYGEN SATURATION: 94 % | HEART RATE: 71 BPM | RESPIRATION RATE: 16 BRPM | HEIGHT: 64 IN | DIASTOLIC BLOOD PRESSURE: 64 MMHG | TEMPERATURE: 98.1 F | BODY MASS INDEX: 33.55 KG/M2

## 2023-02-09 DIAGNOSIS — E78.5 DYSLIPIDEMIA: ICD-10-CM

## 2023-02-09 DIAGNOSIS — M81.0 AGE-RELATED OSTEOPOROSIS WITHOUT CURRENT PATHOLOGICAL FRACTURE: ICD-10-CM

## 2023-02-09 DIAGNOSIS — K59.09 OTHER CONSTIPATION: ICD-10-CM

## 2023-02-09 DIAGNOSIS — R73.03 PRE-DIABETES: ICD-10-CM

## 2023-02-09 DIAGNOSIS — J45.50 SEVERE PERSISTENT ASTHMA WITHOUT COMPLICATION: ICD-10-CM

## 2023-02-09 DIAGNOSIS — J43.1 PANLOBULAR EMPHYSEMA (HCC): ICD-10-CM

## 2023-02-09 PROBLEM — F41.9 ANXIETY: Status: RESOLVED | Noted: 2022-08-03 | Resolved: 2023-02-09

## 2023-02-09 PROBLEM — J96.10 CHRONIC RESPIRATORY FAILURE (HCC): Status: RESOLVED | Noted: 2022-11-10 | Resolved: 2023-02-09

## 2023-02-09 PROBLEM — J45.901 ASTHMA EXACERBATION ATTACKS: Status: RESOLVED | Noted: 2022-07-12 | Resolved: 2023-02-09

## 2023-02-09 PROCEDURE — 99215 OFFICE O/P EST HI 40 MIN: CPT | Performed by: INTERNAL MEDICINE

## 2023-02-09 ASSESSMENT — FIBROSIS 4 INDEX: FIB4 SCORE: 0.99

## 2023-02-09 ASSESSMENT — PATIENT HEALTH QUESTIONNAIRE - PHQ9: CLINICAL INTERPRETATION OF PHQ2 SCORE: 0

## 2023-02-09 NOTE — ASSESSMENT & PLAN NOTE
Chronic ongoing problem, continue current regimen with Breo Ellipta 200-25 mcg daily and albuterol inhaler and duonebs as needed.

## 2023-02-09 NOTE — ASSESSMENT & PLAN NOTE
Chronic ongoing problem, prefers not to take medications, reports her primary diabetes has been stable for decades.  Recheck A1c to ensure ongoing stability.

## 2023-02-09 NOTE — ASSESSMENT & PLAN NOTE
Chronic ongoing problem, encouraged her to continue with good oral intake with water so that the fiber does not cause paradoxical constipation. Can continue with multiple modalities including colace, miralax, dulcolax, etc and consider linzess trial if still having only fair results.

## 2023-02-09 NOTE — ASSESSMENT & PLAN NOTE
Chronic ongoing problem, hesitant to try medicines, prefers natural approaches.  Just meets criteria for osteoporosis based off T score in lumbar spine.  No history of fragility fracture.  Continue calcium and vitamin D and will encourage regular weightbearing exercise, currently participating in pulmonary rehab.
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
## 2023-02-09 NOTE — ASSESSMENT & PLAN NOTE
Chronic ongoing problem, she has abnormal cholesterol with low HDL and high LDL.  She was suggested to go on statin therapy in the past but declined.  She prefers to do natural treatment modalities.  Could consider CT cardiac score for additional risk stratification in the future.  Could also consider red yeast rice extract and fish oil as more natural approaches to treating her cholesterol.  We will have her update her lab work and can discuss this in detail at her follow-up.

## 2023-02-09 NOTE — ASSESSMENT & PLAN NOTE
Chronic ongoing problem, slowly improving, now off supplemental oxygen, continue pulmonary rehab program and follow up with pulmonary medicine as recommended. Continue breo, albuterol, and duonebs.

## 2023-02-13 ENCOUNTER — HOSPITAL ENCOUNTER (OUTPATIENT)
Dept: PULMONOLOGY | Facility: MEDICAL CENTER | Age: 78
End: 2023-02-13
Attending: INTERNAL MEDICINE
Payer: MEDICARE

## 2023-02-13 PROCEDURE — G0237 THERAPEUTIC PROCD STRG ENDUR: HCPCS

## 2023-02-13 PROCEDURE — G0238 OTH RESP PROC, INDIV: HCPCS

## 2023-02-13 PROCEDURE — G0239 OTH RESP PROC, GROUP: HCPCS

## 2023-02-15 ENCOUNTER — PATIENT OUTREACH (OUTPATIENT)
Dept: HEALTH INFORMATION MANAGEMENT | Facility: OTHER | Age: 78
End: 2023-02-15
Payer: MEDICARE

## 2023-02-15 ENCOUNTER — APPOINTMENT (OUTPATIENT)
Dept: PULMONOLOGY | Facility: MEDICAL CENTER | Age: 78
End: 2023-02-15
Attending: INTERNAL MEDICINE
Payer: MEDICARE

## 2023-02-15 DIAGNOSIS — J44.9 CHRONIC OBSTRUCTIVE PULMONARY DISEASE, UNSPECIFIED COPD TYPE (HCC): ICD-10-CM

## 2023-02-15 PROCEDURE — 99490 CHRNC CARE MGMT STAFF 1ST 20: CPT | Performed by: INTERNAL MEDICINE

## 2023-02-15 NOTE — PROGRESS NOTES
2/15/23 3:20pm:  Nurse CM outreach call for monthly CCM assessment. VM left with CM contact number.     2/17/23 11:10 AM: Nurse CM outreach call for monthly CCM assessment. Nurse reviewed care plan and completed monthly CCM assessment.      Assessment: Pt reports she is currently doing okay. Reports additional stress over the past 3 weeks as her she reports her spouse passed away. Pt reports her daughter is supportive and checking on her well-being. Pt reports she is following with pulmonary rehab. States she is finding rehab very beneficial. Reports she is doing her pursed lip breathing when necessary. Reports she currently doesn't wear oxygen. Pt reports blood pressure readings have been fluctuating. Pt states it depends on her stress level. Reports reading today was good was 127/75. Reports yesterday reading 163/93. Reports reading on 2/15: 147/85 in the evening. Reports she will continue to monitor and follow-up with PCP if blood pressure readings elevated. Pt has CM contact number to call if needing assistance.       Education: Reviewed COPD action plan. Pt reports no current COPD symptoms. Reviewed medications and inhalers.    Care Plan: Reviewed care plan, no update to care plan at this time. Pt will continue to work on goals.    Progress: Making progress. Pt following with pulmonary rehab. Continue to work towards CCM goals.    Next Outreach:   One month  Nurse Care Coordinator: Melissa Vargas  Personal Care Management:  [phone].

## 2023-02-22 ENCOUNTER — HOSPITAL ENCOUNTER (OUTPATIENT)
Dept: PULMONOLOGY | Facility: MEDICAL CENTER | Age: 78
End: 2023-02-22
Attending: INTERNAL MEDICINE
Payer: MEDICARE

## 2023-02-22 PROCEDURE — G0239 OTH RESP PROC, GROUP: HCPCS

## 2023-02-22 PROCEDURE — G0237 THERAPEUTIC PROCD STRG ENDUR: HCPCS

## 2023-02-22 PROCEDURE — G0238 OTH RESP PROC, INDIV: HCPCS

## 2023-02-27 ENCOUNTER — HOSPITAL ENCOUNTER (OUTPATIENT)
Dept: PULMONOLOGY | Facility: MEDICAL CENTER | Age: 78
End: 2023-02-27
Attending: INTERNAL MEDICINE
Payer: MEDICARE

## 2023-02-27 PROCEDURE — G0239 OTH RESP PROC, GROUP: HCPCS

## 2023-02-27 PROCEDURE — G0238 OTH RESP PROC, INDIV: HCPCS

## 2023-02-27 PROCEDURE — G0237 THERAPEUTIC PROCD STRG ENDUR: HCPCS

## 2023-03-01 ENCOUNTER — APPOINTMENT (OUTPATIENT)
Dept: PULMONOLOGY | Facility: MEDICAL CENTER | Age: 78
End: 2023-03-01
Attending: INTERNAL MEDICINE
Payer: MEDICARE

## 2023-03-06 ENCOUNTER — HOSPITAL ENCOUNTER (OUTPATIENT)
Dept: PULMONOLOGY | Facility: MEDICAL CENTER | Age: 78
End: 2023-03-06
Attending: INTERNAL MEDICINE
Payer: MEDICARE

## 2023-03-06 PROCEDURE — G0237 THERAPEUTIC PROCD STRG ENDUR: HCPCS | Mod: XU

## 2023-03-06 PROCEDURE — G0239 OTH RESP PROC, GROUP: HCPCS

## 2023-03-06 PROCEDURE — G0238 OTH RESP PROC, INDIV: HCPCS | Mod: XU

## 2023-03-08 ENCOUNTER — HOSPITAL ENCOUNTER (OUTPATIENT)
Dept: PULMONOLOGY | Facility: MEDICAL CENTER | Age: 78
End: 2023-03-08
Attending: INTERNAL MEDICINE
Payer: MEDICARE

## 2023-03-08 PROCEDURE — G0238 OTH RESP PROC, INDIV: HCPCS | Mod: XU

## 2023-03-08 PROCEDURE — G0237 THERAPEUTIC PROCD STRG ENDUR: HCPCS | Mod: XU

## 2023-03-08 PROCEDURE — G0239 OTH RESP PROC, GROUP: HCPCS

## 2023-03-13 ENCOUNTER — HOSPITAL ENCOUNTER (OUTPATIENT)
Dept: PULMONOLOGY | Facility: MEDICAL CENTER | Age: 78
End: 2023-03-13
Attending: INTERNAL MEDICINE
Payer: MEDICARE

## 2023-03-13 PROCEDURE — G0239 OTH RESP PROC, GROUP: HCPCS

## 2023-03-13 PROCEDURE — G0237 THERAPEUTIC PROCD STRG ENDUR: HCPCS

## 2023-03-13 PROCEDURE — G0238 OTH RESP PROC, INDIV: HCPCS

## 2023-03-15 ENCOUNTER — HOSPITAL ENCOUNTER (OUTPATIENT)
Dept: PULMONOLOGY | Facility: MEDICAL CENTER | Age: 78
End: 2023-03-15
Attending: INTERNAL MEDICINE
Payer: MEDICARE

## 2023-03-15 PROCEDURE — G0239 OTH RESP PROC, GROUP: HCPCS

## 2023-03-15 PROCEDURE — G0238 OTH RESP PROC, INDIV: HCPCS

## 2023-03-15 PROCEDURE — G0237 THERAPEUTIC PROCD STRG ENDUR: HCPCS | Mod: XU

## 2023-03-17 ENCOUNTER — PATIENT OUTREACH (OUTPATIENT)
Dept: HEALTH INFORMATION MANAGEMENT | Facility: OTHER | Age: 78
End: 2023-03-17
Payer: MEDICARE

## 2023-03-17 DIAGNOSIS — J44.9 CHRONIC OBSTRUCTIVE PULMONARY DISEASE, UNSPECIFIED COPD TYPE (HCC): ICD-10-CM

## 2023-03-17 DIAGNOSIS — J45.901 ASTHMA WITH ACUTE EXACERBATION, UNSPECIFIED ASTHMA SEVERITY, UNSPECIFIED WHETHER PERSISTENT: ICD-10-CM

## 2023-03-17 PROCEDURE — 99999 PR NO CHARGE: CPT | Performed by: INTERNAL MEDICINE

## 2023-03-17 NOTE — PROGRESS NOTES
Nurse CM outreach call for monthly CCM assessment.      Assessment:  Pt reports doing well. States she continues to go to  pulmonary rehab. Reports she is feeling good today with no respiratory problems. Pt has been doing exercises at pulmonary rehab. Reports she does get short of breath with exertion. States she is deconditioned. Pt reports her stress levels have improved. Reports no current problems. States she has all of her medications.    Education: Reviewed COPD action plan    Care Plan: reviewed with patient      Progress: Making progress towards goals.    Next Outreach:  One month  Nurse Care Coordinator:  Melissa Vargas  [phone]

## 2023-03-20 ENCOUNTER — HOSPITAL ENCOUNTER (OUTPATIENT)
Dept: PULMONOLOGY | Facility: MEDICAL CENTER | Age: 78
End: 2023-03-20
Attending: INTERNAL MEDICINE
Payer: MEDICARE

## 2023-03-20 PROCEDURE — G0239 OTH RESP PROC, GROUP: HCPCS

## 2023-03-20 PROCEDURE — G0238 OTH RESP PROC, INDIV: HCPCS | Mod: XU

## 2023-03-20 PROCEDURE — G0237 THERAPEUTIC PROCD STRG ENDUR: HCPCS | Mod: XU

## 2023-04-06 ENCOUNTER — HOSPITAL ENCOUNTER (OUTPATIENT)
Dept: LAB | Facility: MEDICAL CENTER | Age: 78
End: 2023-04-06
Attending: INTERNAL MEDICINE
Payer: MEDICARE

## 2023-04-06 DIAGNOSIS — E78.5 DYSLIPIDEMIA: ICD-10-CM

## 2023-04-06 DIAGNOSIS — R73.03 PRE-DIABETES: ICD-10-CM

## 2023-04-06 LAB
25(OH)D3 SERPL-MCNC: 48 NG/ML (ref 30–100)
ALBUMIN SERPL BCP-MCNC: 4.3 G/DL (ref 3.2–4.9)
ALBUMIN/GLOB SERPL: 1.5 G/DL
ALP SERPL-CCNC: 84 U/L (ref 30–99)
ALT SERPL-CCNC: 39 U/L (ref 2–50)
ANION GAP SERPL CALC-SCNC: 13 MMOL/L (ref 7–16)
AST SERPL-CCNC: 40 U/L (ref 12–45)
BASOPHILS # BLD AUTO: 1.1 % (ref 0–1.8)
BASOPHILS # BLD: 0.06 K/UL (ref 0–0.12)
BILIRUB SERPL-MCNC: 0.4 MG/DL (ref 0.1–1.5)
BUN SERPL-MCNC: 14 MG/DL (ref 8–22)
CALCIUM ALBUM COR SERPL-MCNC: 9.3 MG/DL (ref 8.5–10.5)
CALCIUM SERPL-MCNC: 9.5 MG/DL (ref 8.5–10.5)
CHLORIDE SERPL-SCNC: 106 MMOL/L (ref 96–112)
CHOLEST SERPL-MCNC: 177 MG/DL (ref 100–199)
CO2 SERPL-SCNC: 24 MMOL/L (ref 20–33)
CREAT SERPL-MCNC: 0.86 MG/DL (ref 0.5–1.4)
EOSINOPHIL # BLD AUTO: 0.14 K/UL (ref 0–0.51)
EOSINOPHIL NFR BLD: 2.5 % (ref 0–6.9)
ERYTHROCYTE [DISTWIDTH] IN BLOOD BY AUTOMATED COUNT: 46.4 FL (ref 35.9–50)
EST. AVERAGE GLUCOSE BLD GHB EST-MCNC: 134 MG/DL
FASTING STATUS PATIENT QL REPORTED: NORMAL
GFR SERPLBLD CREATININE-BSD FMLA CKD-EPI: 69 ML/MIN/1.73 M 2
GLOBULIN SER CALC-MCNC: 2.9 G/DL (ref 1.9–3.5)
GLUCOSE SERPL-MCNC: 102 MG/DL (ref 65–99)
HBA1C MFR BLD: 6.3 % (ref 4–5.6)
HCT VFR BLD AUTO: 41.3 % (ref 37–47)
HDLC SERPL-MCNC: 36 MG/DL
HGB BLD-MCNC: 13.4 G/DL (ref 12–16)
IMM GRANULOCYTES # BLD AUTO: 0.02 K/UL (ref 0–0.11)
IMM GRANULOCYTES NFR BLD AUTO: 0.4 % (ref 0–0.9)
LDLC SERPL CALC-MCNC: 109 MG/DL
LYMPHOCYTES # BLD AUTO: 1.55 K/UL (ref 1–4.8)
LYMPHOCYTES NFR BLD: 27.6 % (ref 22–41)
MCH RBC QN AUTO: 30.2 PG (ref 27–33)
MCHC RBC AUTO-ENTMCNC: 32.4 G/DL (ref 33.6–35)
MCV RBC AUTO: 93 FL (ref 81.4–97.8)
MONOCYTES # BLD AUTO: 0.43 K/UL (ref 0–0.85)
MONOCYTES NFR BLD AUTO: 7.7 % (ref 0–13.4)
NEUTROPHILS # BLD AUTO: 3.42 K/UL (ref 2–7.15)
NEUTROPHILS NFR BLD: 60.7 % (ref 44–72)
NRBC # BLD AUTO: 0 K/UL
NRBC BLD-RTO: 0 /100 WBC
PLATELET # BLD AUTO: 293 K/UL (ref 164–446)
PMV BLD AUTO: 10.1 FL (ref 9–12.9)
POTASSIUM SERPL-SCNC: 4.5 MMOL/L (ref 3.6–5.5)
PROT SERPL-MCNC: 7.2 G/DL (ref 6–8.2)
RBC # BLD AUTO: 4.44 M/UL (ref 4.2–5.4)
SODIUM SERPL-SCNC: 143 MMOL/L (ref 135–145)
T4 FREE SERPL-MCNC: 0.72 NG/DL (ref 0.93–1.7)
TRIGL SERPL-MCNC: 160 MG/DL (ref 0–149)
TSH SERPL DL<=0.005 MIU/L-ACNC: 21.2 UIU/ML (ref 0.38–5.33)
VIT B12 SERPL-MCNC: 1006 PG/ML (ref 211–911)
WBC # BLD AUTO: 5.6 K/UL (ref 4.8–10.8)

## 2023-04-06 PROCEDURE — 36415 COLL VENOUS BLD VENIPUNCTURE: CPT

## 2023-04-06 PROCEDURE — 80061 LIPID PANEL: CPT

## 2023-04-06 PROCEDURE — 84443 ASSAY THYROID STIM HORMONE: CPT

## 2023-04-06 PROCEDURE — 82306 VITAMIN D 25 HYDROXY: CPT

## 2023-04-06 PROCEDURE — 84439 ASSAY OF FREE THYROXINE: CPT

## 2023-04-06 PROCEDURE — 83036 HEMOGLOBIN GLYCOSYLATED A1C: CPT

## 2023-04-06 PROCEDURE — 82607 VITAMIN B-12: CPT

## 2023-04-06 PROCEDURE — 85025 COMPLETE CBC W/AUTO DIFF WBC: CPT

## 2023-04-06 PROCEDURE — 80053 COMPREHEN METABOLIC PANEL: CPT

## 2023-04-12 ENCOUNTER — TELEPHONE (OUTPATIENT)
Dept: URGENT CARE | Facility: PHYSICIAN GROUP | Age: 78
End: 2023-04-12
Payer: MEDICARE

## 2023-04-12 NOTE — RESULT ENCOUNTER NOTE
Spoke with patient  She will try low dose thyroid replacement   Called her back to let her know take first thing in the morning and don't drink coffee or eat or take any vitamins for an hour after

## 2023-04-12 NOTE — TELEPHONE ENCOUNTER
----- Message from Jackelyn Trujillo D.O. sent at 4/11/2023  6:36 PM PDT -----  Please call patient, TSH is pretty high suggesting hypothyroidism- any symptoms of low thyroid like low heart rate, weight gain, fatigue, constipation, hair/skin changes? Other labs were fairly stable and we can review next month

## 2023-04-13 DIAGNOSIS — E03.9 ACQUIRED HYPOTHYROIDISM: ICD-10-CM

## 2023-04-13 RX ORDER — LEVOTHYROXINE SODIUM 0.03 MG/1
25 TABLET ORAL
Qty: 100 TABLET | Refills: 3 | Status: SHIPPED | OUTPATIENT
Start: 2023-04-13 | End: 2023-05-25

## 2023-04-13 RX ORDER — LEVOTHYROXINE SODIUM 0.03 MG/1
25 TABLET ORAL
Qty: 90 TABLET | Refills: 3 | Status: SHIPPED | OUTPATIENT
Start: 2023-04-13 | End: 2023-04-13 | Stop reason: SDUPTHER

## 2023-04-18 ENCOUNTER — PATIENT OUTREACH (OUTPATIENT)
Dept: HEALTH INFORMATION MANAGEMENT | Facility: OTHER | Age: 78
End: 2023-04-18
Payer: MEDICARE

## 2023-04-18 DIAGNOSIS — Z63.4 BEREAVEMENT: ICD-10-CM

## 2023-04-18 DIAGNOSIS — J44.9 CHRONIC OBSTRUCTIVE PULMONARY DISEASE, UNSPECIFIED COPD TYPE (HCC): ICD-10-CM

## 2023-04-18 DIAGNOSIS — F43.9 STRESS: ICD-10-CM

## 2023-04-18 DIAGNOSIS — J45.901 ASTHMA WITH ACUTE EXACERBATION, UNSPECIFIED ASTHMA SEVERITY, UNSPECIFIED WHETHER PERSISTENT: ICD-10-CM

## 2023-04-18 PROCEDURE — 99490 CHRNC CARE MGMT STAFF 1ST 20: CPT | Performed by: INTERNAL MEDICINE

## 2023-04-18 SDOH — SOCIAL STABILITY - SOCIAL INSECURITY: DISSAPEARANCE AND DEATH OF FAMILY MEMBER: Z63.4

## 2023-04-18 NOTE — PROGRESS NOTES
Patient called nurse LYDIA to report concern with some blood pressure readings from earlier today. Reports she checked her blood pressure at 7:15 am today and reading was 167/85. Reports she has been under stress and didn't sleep well last night. Reports she has also had a headache for several days. Reports headache may be related to the wind and her sinuses. Pt reports she checked another reading several hours later at 9:45 and reading was 173/104. Pt reports she checked a reading about 15 minutes ago and now reading is 144/91 at 10:40 am. Pt denies any chest pains or shortness of breath. Pt reports she doesn't have a history of hypertension. Reports she has noted that her blood pressure has fluctuated over the past several years.     Pt reports stress related to the death of her spouse and family issues related to her property. Pt has a supportive daughter that has been assisting her with her property and concerns. Patient is requesting a referral to Behavioral Health for stress management and grief.     Plan: Nurse discussed ER precautions if elevated blood pressure with any symptoms including worsening headache, chest pain or shortness of breath go to ER for evaluation. Nurse offered pt appointment at [facility] this week for follow-up. Pt declining to be seen this week. Pt will continue to monitor blood pressure readings two times daily and will bring log of blood pressure readings to next PCP appointment. Pt will contact nurse LYDIA if readings remain elevated. Pt requesting referral to Behavioral Health. Nurse will route update of blood pressure readings and pt request to PCP.

## 2023-05-09 ENCOUNTER — OFFICE VISIT (OUTPATIENT)
Dept: SLEEP MEDICINE | Facility: MEDICAL CENTER | Age: 78
End: 2023-05-09
Attending: INTERNAL MEDICINE
Payer: MEDICARE

## 2023-05-09 VITALS
DIASTOLIC BLOOD PRESSURE: 78 MMHG | BODY MASS INDEX: 33.8 KG/M2 | HEIGHT: 64 IN | OXYGEN SATURATION: 94 % | SYSTOLIC BLOOD PRESSURE: 128 MMHG | HEART RATE: 67 BPM | WEIGHT: 198 LBS

## 2023-05-09 DIAGNOSIS — J45.40 MODERATE PERSISTENT ASTHMA WITHOUT COMPLICATION: ICD-10-CM

## 2023-05-09 PROCEDURE — 94761 N-INVAS EAR/PLS OXIMETRY MLT: CPT | Performed by: INTERNAL MEDICINE

## 2023-05-09 PROCEDURE — 99214 OFFICE O/P EST MOD 30 MIN: CPT | Performed by: INTERNAL MEDICINE

## 2023-05-09 PROCEDURE — 99213 OFFICE O/P EST LOW 20 MIN: CPT | Performed by: INTERNAL MEDICINE

## 2023-05-09 ASSESSMENT — FIBROSIS 4 INDEX: FIB4 SCORE: 1.68

## 2023-05-09 NOTE — PROGRESS NOTES
"Pulmonary Clinic Note    Chief Complaint:  Chief Complaint   Patient presents with    Follow-Up     Last seen 12/5/22      HPI:   Patrica Qiu is a pleasant 77 y.o. female who has been established in our pulmonary clinic.  She has a history of moderate to severe persistent asthma, environmental allergies, peripheral eosinophilia, and obesity.  She quit smoking Father's Day of 2022.  Also has a history of not tolerating medications.    Today: Here today for routine follow-up.  Continues to use Breo.  Not requiring rescue inhalers.  She is checking her pulse oximetry throughout the night and is ranging from 94 to 97%.  She is wearing her oxygen for comfort.  She does note that she has severe seasonal allergies and that it gets bad this time of year.     Additional history:   From Dr. Mejias's note in November 2021: \"This patient is a 75 y.o. female whom is followed in our clinic for COPD with RAD component last seen by Dr. Oliver on 5/14/21. Pt is an active tobacco smoker with >20 pk year hx. She was referred to us for cough and tx initially with spiriva which caused adverse side effects. She has mild airflow obstruction with normal FEV post BD at 80% predicted based on updated PFTs today. Normal TLC with air trapping and preserved DLCO. She uses BReo as needed but has not needed this or her rescue inhaler for several months. She does have environmental allergies with associated sinus congesiton and peripheral eosinophilia in the past. Functionally she is quite well, MMRC 1. [facility]. \"    July 2022: Evaluated in the ED for exacerbation of asthma/COPD.  Apparently she was not taking her inhalers due to financial/insurance issues.  She was placed on steroids and discharged with oxygen.    Aug 2022: Medication list includes Breo Ellipta, DuoNebs, Ventolin inhaler.  She request switching from Ventolin to albuterol for insurance purposes.  She states that she has been in the ED twice in " the last 6 months.  The first time was in [place] and the second time was at [facility].  The first time was when it was very windy and the pollen counts were high which caused her to have an asthma exacerbation.  She was treated with prednisone.  She had been an active smoker at the time but quit and has not smoked since.  The more recent visit, the wind had also picked up but she had also run out of inhalers due to insurance issues.  At that ED visit, she was found to be hypoxic and was started on oxygen.  She was also given prednisone.  She became more compliant with her inhalers.  She is now using Breo as prescribed, whereas before she was using it intermittently.    She has a history of absolute eosinophilia as high as 1480.    I personally reviewed her PFTs from November 2021.  No fixed obstruction.  Positive bronchodilator response.  Air trapping.    I personally reviewed her CT scan from July 2022.  She has diffuse coronary artery calcification.  No concerning lung findings.    she has 2 ED visits in the past year for respiratory issues.    Her most recent transthoracic echocardiogram in July 2022 is unremarkable.    Exacerbations this year:    MMRC Grade:   0: Breathless only during strenuous exercise  1: Short of breath when hurrying or going up a small hill  2: Walks slower than friends due to breathlessness, has to stop at own pace  3: Stops to catch breath on level ground after 100m  4: Breathless with ambulating around house or ADLs    Dec 2022: She is doing much better.  She not using her oxygen and stated she has not needed it for about a month.  Oxygen saturation on room air is 95%.  She has an evaluation for pulmonary rehab next week.  She does not have any ER or urgent care visits since I last saw her.  She requests a refill of Breo.    Past Medical History:   Diagnosis Date    Acute respiratory failure with hypoxia, asthma exacerbation (HCC) 07/13/2022    Anxiety 08/03/2022    Chronic, states that she  has anxiety related to her COPD and feeling like she cannot breathe typically.  States that she does do breathing exercises (Niko newsome) to assist with this.     Asthma     Asthma exacerbation attacks 2022    BMI 32.0-32.9,adult 11/10/2022    Chest tightness     Chronic obstructive pulmonary disease (HCC)     Chronic respiratory failure (HCC) 11/10/2022    Chronic, had been using her oxygen continuously. She still carries her portable oxygen tanks if she needs it however she is only needing with the activity outside or when walking around stores for prolonged periods of time. She feels it would be more cost effective if she had an inogen concentrator and palafox snot have a lot of space for tanks in her 32 ft trailer and no space to store them, feels it    Shortness of breath        Past Surgical History:   Procedure Laterality Date    DILATION AND CURETTAGE      TUBAL LIGATION         Social History     Socioeconomic History    Marital status: Single     Spouse name: Not on file    Number of children: Not on file    Years of education: Not on file    Highest education level: Not on file   Occupational History    Not on file   Tobacco Use    Smoking status: Former     Packs/day: 0.25     Years: 32.00     Pack years: 8.00     Types: Cigarettes     Quit date: 2022     Years since quittin.8    Smokeless tobacco: Never   Vaping Use    Vaping Use: Former    Passive vaping exposure: Yes   Substance and Sexual Activity    Alcohol use: Not Currently     Comment: not for 5 years    Drug use: No    Sexual activity: Not on file   Other Topics Concern    Not on file   Social History Narrative    Has a life partner with 32 years    3 children, 1 lives in [place], son is  a , currently living with daughter Kadie     Retired, worked for [employer]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     Social Determinants of Health     Financial Resource Strain: Medium Risk    Difficulty of Paying  Living Expenses: Somewhat hard   Food Insecurity: No Food Insecurity    Worried About Running Out of Food in the Last Year: Never true    Ran Out of Food in the Last Year: Never true   Transportation Needs: No Transportation Needs    Lack of Transportation (Medical): No    Lack of Transportation (Non-Medical): No   Physical Activity: Inactive    Days of Exercise per Week: 0 days    Minutes of Exercise per Session: 0 min   Stress: Not on file   Social Connections: Unknown    Frequency of Communication with Friends and Family: More than three times a week    Frequency of Social Gatherings with Friends and Family: More than three times a week    Attends [employer] Services: Not on file    Active Member of Clubs or Organizations: Not on file    Attends Club or Organization Meetings: Not on file    Marital Status: Living with partner   Intimate Partner Violence: Not on file   Housing Stability: Unknown    Unable to Pay for Housing in the Last Year: No    Number of Places Lived in the Last Year: Not on file    Unstable Housing in the Last Year: No          Family History   Problem Relation Age of Onset    Heart Disease Mother     Heart Disease Father     Cancer Father     Asthma Father     Dementia Father     Heart Attack Brother     Heart Attack Brother     Parkinson's Disease Brother     Asthma Brother        Current Outpatient Medications on File Prior to Visit   Medication Sig Dispense Refill    levothyroxine (SYNTHROID) 25 MCG Tab Take 1 Tablet by mouth every morning on an empty stomach. 100 Tablet 3    fluticasone furoate-vilanterol (BREO ELLIPTA) 200-25 MCG/ACT AEROSOL POWDER, BREATH ACTIVATED Inhale 1 Puff every day. 3 Each 3    DIGESTIVE AIDS MIXTURE PO Take  by mouth.      albuterol 108 (90 Base) MCG/ACT Aero Soln inhalation aerosol Inhale 2 Puffs every 6 hours as needed for Shortness of Breath. 3 Each 3    Ascorbic Acid (VITAMIN C) 1000 MG Tab Take 1 Tablet by mouth.      multivitamin (THERAGRAN) Tab Take 1  "Tablet by mouth every day.      cyanocobalamin (VITAMIN B12) 1000 MCG Tab Take 1 Tablet by mouth every day.      vitamin D3 (CHOLECALCIFEROL) 1000 Unit (25 mcg) Tab Take 1 Tablet by mouth every day.      vitamin e (VITAMIN E) 400 UNIT Cap Take 1 Capsule by mouth every day.      calcium citrate (CALCITRATE) 950 (200 Ca) MG Tab Take 1 Tablet by mouth every day.      fluticasone (FLONASE) 50 MCG/ACT nasal spray Administer 2 Sprays into affected nostril(S) every day.      loratadine (CLARITIN) 10 MG Tab Take 1 Tablet by mouth every day.       No current facility-administered medications on file prior to visit.       Allergies: Patient has no known allergies.      ROS: A 12 point ROS was performed on intake and during my interview. ROS negative unless specifically noted in HPI.     Vitals:  /78 (BP Location: Right arm, Patient Position: Sitting, BP Cuff Size: Adult)   Pulse 67   Ht 1.626 m (5' 4\")   Wt 89.8 kg (198 lb)   SpO2 94%     Physical Exam:  Physical Exam  Vitals reviewed. Exam conducted with a chaperone present.   Constitutional:       Appearance: Normal appearance. She is obese.   Eyes:      Pupils: Pupils are equal, round, and reactive to light.   Cardiovascular:      Rate and Rhythm: Normal rate and regular rhythm.      Heart sounds: No murmur heard.    No gallop.   Pulmonary:      Effort: Pulmonary effort is normal. No respiratory distress.      Breath sounds: Normal breath sounds. No wheezing or rales.   Musculoskeletal:      Right lower leg: No edema.      Left lower leg: No edema.   Skin:     General: Skin is warm and dry.   Neurological:      General: No focal deficit present.      Mental Status: She is alert and oriented to person, place, and time.   Psychiatric:         Mood and Affect: Mood normal.         Behavior: Behavior normal.       Laboratory Data: I personally reviewed labs including historical lab trends.       Assessment/Plan:    #moderate persistent asthma  #environmental " allergies  #peripheral eosinophilia  #former smoker  #obesity    Plan:    -Continue current inhalers and nebulizers  -completed pulmonary rehab  -vaccines UTD    Follow up q6m. Sooner if needed.     Total consult time was 35 min. This includes reviewing previous provider notes, personally reviewing previous imaging and spirometry, educating the patient about their disease process, and inhaler education.   __________  Sumanth Smith, DO  Pulmonary and Critical Care Medicine  [facility]    Please note that this dictation was created using voice recognition software. The accuracy of the dictation is limited to the abilities of the software. I have made every reasonable attempt to correct obvious errors, but I expect that there are errors of grammar and possibly content that I did not discover before finalizing the note.

## 2023-05-09 NOTE — PROCEDURES
Multi-Ox Readings  Multi Ox #1 Room air   O2 sat % at rest 92   O2 sat % on exertion 91   O2 sat average on exertion     Multi Ox #2     O2 sat % at rest     O2 sat % on exertion     O2 sat average on exertion       Oxygen Use     Oxygen Frequency     Duration of need     Is the patient mobile within the home?     CPAP Use?     BIPAP Use?     Servo Titration

## 2023-05-17 ENCOUNTER — PATIENT OUTREACH (OUTPATIENT)
Dept: HEALTH INFORMATION MANAGEMENT | Facility: OTHER | Age: 78
End: 2023-05-17

## 2023-05-17 ENCOUNTER — OFFICE VISIT (OUTPATIENT)
Dept: MEDICAL GROUP | Facility: PHYSICIAN GROUP | Age: 78
End: 2023-05-17
Payer: MEDICARE

## 2023-05-17 VITALS
DIASTOLIC BLOOD PRESSURE: 68 MMHG | TEMPERATURE: 97.4 F | RESPIRATION RATE: 16 BRPM | SYSTOLIC BLOOD PRESSURE: 126 MMHG | OXYGEN SATURATION: 95 % | HEIGHT: 64 IN | BODY MASS INDEX: 34.3 KG/M2 | HEART RATE: 69 BPM | WEIGHT: 200.9 LBS

## 2023-05-17 DIAGNOSIS — E03.9 HYPOTHYROIDISM, UNSPECIFIED TYPE: ICD-10-CM

## 2023-05-17 DIAGNOSIS — E61.1 IRON DEFICIENCY: ICD-10-CM

## 2023-05-17 DIAGNOSIS — J44.9 CHRONIC OBSTRUCTIVE PULMONARY DISEASE, UNSPECIFIED COPD TYPE (HCC): ICD-10-CM

## 2023-05-17 DIAGNOSIS — R73.03 PRE-DIABETES: ICD-10-CM

## 2023-05-17 DIAGNOSIS — E78.5 DYSLIPIDEMIA: ICD-10-CM

## 2023-05-17 PROCEDURE — 3074F SYST BP LT 130 MM HG: CPT | Performed by: INTERNAL MEDICINE

## 2023-05-17 PROCEDURE — 99999 PR NO CHARGE: CPT | Performed by: INTERNAL MEDICINE

## 2023-05-17 PROCEDURE — 3078F DIAST BP <80 MM HG: CPT | Performed by: INTERNAL MEDICINE

## 2023-05-17 PROCEDURE — 99214 OFFICE O/P EST MOD 30 MIN: CPT | Performed by: INTERNAL MEDICINE

## 2023-05-17 ASSESSMENT — FIBROSIS 4 INDEX: FIB4 SCORE: 1.68

## 2023-05-17 NOTE — ASSESSMENT & PLAN NOTE
New problem, check ferritin and iron levels to ensure no contributing iron deficiency.  She has a daughter with heterozygous hemochromatosis.

## 2023-05-17 NOTE — ASSESSMENT & PLAN NOTE
Chronic ongoing problem, discussed slight worsening of A1c, she is going through a lot of stressors right now, she will try to focus back on her diet again and will get her thyroid under better control and can recheck again in a few months.

## 2023-05-17 NOTE — ASSESSMENT & PLAN NOTE
New decompensated problem, fairly abrupt onset of hypothyroidism.  We will check antibodies and ultrasound and consider referral to endocrinology if needed.  Update thyroid levels 6 to 8 weeks after initiation of levothyroxine and adjust dose as indicated.  In the meantime continue levothyroxine 25 mcg daily.

## 2023-05-17 NOTE — PROGRESS NOTES
Subjective:   Chief Complaint/History of Present Illness:  Patrica Qiu is a 77 y.o. female established patient who presents today to discuss medical problems as listed below. Patrica is accompanied by her daughter, Kadie.    Problem   Iron Deficiency    He has low running MCHC.  History of heterozygous hemochromatosis in her daughter.  No previous iron levels evaluated.  She reports easy fatigability and breathlessness.       Hypothyroidism     Latest Reference Range & Units 09/19/22 09:18 04/06/23 08:42   TSH 0.380 - 5.330 uIU/mL 3.730 21.200 (H)   Free T-4 0.93 - 1.70 ng/dL  0.72 (L)     New finding in April 2023.  She has associated depression and lethargy.  She has a daughter with Hashimoto's.  Normal testing in September 2022.  No preceding viral infections that she recalls.  Feels better since we initiated levothyroxine.    Current regimen: Levothyroxine 25 mcg daily     Dyslipidemia     Latest Reference Range & Units 04/06/23 08:42   Cholesterol,Tot 100 - 199 mg/dL 177   Triglycerides 0 - 149 mg/dL 160 (H)   HDL >=40 mg/dL 36 !   LDL <100 mg/dL 109 (H)     The 10-year ASCVD risk score (Tam DK, et al., 2019) is: 18.4%     Suggested to takes statins in the past and declined, prefers natural or lifestyle treatments to her health.     Pre-Diabetes     Latest Reference Range & Units 04/06/23 08:42   Glycohemoglobin 4.0 - 5.6 % 6.3 (H)   Estim. Avg Glu mg/dL 134     She reports diagnosis of prediabetes dating back to her 30s or 40s.  She is never required glucose lowering medications.          Current Medications:  Current Outpatient Medications Ordered in Epic   Medication Sig Dispense Refill    levothyroxine (SYNTHROID) 25 MCG Tab Take 1 Tablet by mouth every morning on an empty stomach. 100 Tablet 3    fluticasone furoate-vilanterol (BREO ELLIPTA) 200-25 MCG/ACT AEROSOL POWDER, BREATH ACTIVATED Inhale 1 Puff every day. 3 Each 3    DIGESTIVE AIDS MIXTURE PO Take  by mouth.      albuterol 108 (90  "Base) MCG/ACT Aero Soln inhalation aerosol Inhale 2 Puffs every 6 hours as needed for Shortness of Breath. 3 Each 3    Ascorbic Acid (VITAMIN C) 1000 MG Tab Take 1 Tablet by mouth.      multivitamin (THERAGRAN) Tab Take 1 Tablet by mouth every day.      cyanocobalamin (VITAMIN B12) 1000 MCG Tab Take 1 Tablet by mouth every day.      vitamin D3 (CHOLECALCIFEROL) 1000 Unit (25 mcg) Tab Take 1 Tablet by mouth every day.      vitamin e (VITAMIN E) 400 UNIT Cap Take 1 Capsule by mouth every day.      calcium citrate (CALCITRATE) 950 (200 Ca) MG Tab Take 1 Tablet by mouth every day.      fluticasone (FLONASE) 50 MCG/ACT nasal spray Administer 2 Sprays into affected nostril(S) every day.      loratadine (CLARITIN) 10 MG Tab Take 1 Tablet by mouth every day.       No current [facility]-ordered facility-administered medications on file.          Objective:   Physical Exam:    Vitals: /68 (BP Location: Right arm, Patient Position: Sitting, BP Cuff Size: Adult)   Pulse 69   Temp 36.3 °C (97.4 °F) (Temporal)   Resp 16   Ht 1.626 m (5' 4\")   Wt 91.1 kg (200 lb 14.4 oz)   SpO2 95%    BMI: Body mass index is 34.48 kg/m².  Physical Exam  Constitutional:       General: She is not in acute distress.     Appearance: She is not ill-appearing.   HENT:      Right Ear: There is no impacted cerumen.      Left Ear: Tympanic membrane normal. There is no impacted cerumen.      Ears:      Comments: Opacity of right TM  Eyes:      Conjunctiva/sclera: Conjunctivae normal.   Neck:      Comments: ?nodule on left thyroid lobe  Cardiovascular:      Rate and Rhythm: Normal rate and regular rhythm.      Pulses: Normal pulses.   Pulmonary:      Effort: Pulmonary effort is normal. No respiratory distress.      Comments: Coarse breath sounds bibasilar, mild  Abdominal:      General: Bowel sounds are normal. There is no distension.      Palpations: Abdomen is soft.   Musculoskeletal:      Comments: Puffy b/l lateral malleoli   Lymphadenopathy:      " Cervical: Cervical adenopathy present.   Skin:     General: Skin is warm and dry.      Findings: No rash.   Psychiatric:         Behavior: Behavior normal.         Thought Content: Thought content normal.         Judgment: Judgment normal.      Comments: Reports depressed and stressed mood          Assessment and Plan:   Patrica is a 77 y.o. female with the following:  Problem List Items Addressed This Visit       Dyslipidemia     Chronic ongoing problem, qualifies for statin therapy but declines at this time.  Consider CT cardiac score in the future for additional risk stratification.           Hypothyroidism     New decompensated problem, fairly abrupt onset of hypothyroidism.  We will check antibodies and ultrasound and consider referral to endocrinology if needed.  Update thyroid levels 6 to 8 weeks after initiation of levothyroxine and adjust dose as indicated.  In the meantime continue levothyroxine 25 mcg daily.           Relevant Orders    TSH    FREE THYROXINE    TSI    ANTITHYROGLOBULIN AB    US-THYROID    Iron deficiency     New problem, check ferritin and iron levels to ensure no contributing iron deficiency.  She has a daughter with heterozygous hemochromatosis.           Relevant Orders    FERRITIN    IRON/TOTAL IRON BIND    Pre-diabetes     Chronic ongoing problem, discussed slight worsening of A1c, she is going through a lot of stressors right now, she will try to focus back on her diet again and will get her thyroid under better control and can recheck again in a few months.               RTC: Return in about 3 months (around 8/17/2023).    I spent a total of 34 minutes with record review, exam, communication with the patient, communication with other providers, and documentation of this encounter.    PLEASE NOTE: This dictation was created using voice recognition software. I have made every reasonable attempt to correct obvious errors, but I expect that there are errors of grammar and possibly  content that I did not discover before finalizing the note.      Jackelyn Trujillo, DO  Geriatric and Internal Medicine  [facility]

## 2023-05-17 NOTE — PROGRESS NOTES
Nurse LYDIA met with patient after PCP appointment today. Completed monthly assessment for CCM program.    Assessment    Patient accompanied to appointment today with her daughter, Kadie. Pt here today for follow-up appt with her PCP. Pt reports she continues to have stress in her life related to problems with her property and the death of her spouse. Pt has supportive daughter that has been helping her. Pt has been having fluctuating readings with her home wrist blood pressure monitor. Pt provided with a new blood pressure monitor today to continue to check readings at home. Nurse demonstrated use of monitor. Blood pressure reading was 128/91. Pt will continue to monitor readings at home and bring log to next PCP appointment. Pt will notify nurse LYDIA if ongoing high readings. Pt questioning if Behavior Health referral was completed. Nurse updated pt that PCP did a referral to Behavioral Health on 4/16. Nurse provided patient with telephone number for Behavioral Health scheduling.     Education    Reviewed blood pressure monitor. Keeping log of readings. Pt to bring log of reading to next appointment. Follow low sodium diet.  Stress management. Pt to follow-up with Behavioral Health.    Plan of Care and Goals    Reviewed care plan. Continue to work on goals    Barriers:    Ongoing stress    Progress:    Continue to work on stress management.     Next outreach:  One month  Nurse Care Coordinator: Melissa Vargas  [phone]

## 2023-05-17 NOTE — ASSESSMENT & PLAN NOTE
Chronic ongoing problem, qualifies for statin therapy but declines at this time.  Consider CT cardiac score in the future for additional risk stratification.

## 2023-05-23 ENCOUNTER — APPOINTMENT (OUTPATIENT)
Dept: RADIOLOGY | Facility: MEDICAL CENTER | Age: 78
End: 2023-05-23
Attending: INTERNAL MEDICINE
Payer: MEDICARE

## 2023-05-23 ENCOUNTER — HOSPITAL ENCOUNTER (OUTPATIENT)
Dept: LAB | Facility: MEDICAL CENTER | Age: 78
End: 2023-05-23
Attending: INTERNAL MEDICINE
Payer: MEDICARE

## 2023-05-23 DIAGNOSIS — E61.1 IRON DEFICIENCY: ICD-10-CM

## 2023-05-23 DIAGNOSIS — E03.9 HYPOTHYROIDISM, UNSPECIFIED TYPE: ICD-10-CM

## 2023-05-23 LAB
FERRITIN SERPL-MCNC: 254 NG/ML (ref 10–291)
IRON SATN MFR SERPL: 33 % (ref 15–55)
IRON SERPL-MCNC: 102 UG/DL (ref 40–170)
T4 FREE SERPL-MCNC: 0.95 NG/DL (ref 0.93–1.7)
TIBC SERPL-MCNC: 309 UG/DL (ref 250–450)
TSH SERPL DL<=0.005 MIU/L-ACNC: 18.2 UIU/ML (ref 0.38–5.33)
UIBC SERPL-MCNC: 207 UG/DL (ref 110–370)

## 2023-05-23 PROCEDURE — 76536 US EXAM OF HEAD AND NECK: CPT

## 2023-05-23 PROCEDURE — 83540 ASSAY OF IRON: CPT

## 2023-05-23 PROCEDURE — 82728 ASSAY OF FERRITIN: CPT

## 2023-05-23 PROCEDURE — 86800 THYROGLOBULIN ANTIBODY: CPT

## 2023-05-23 PROCEDURE — 84443 ASSAY THYROID STIM HORMONE: CPT

## 2023-05-23 PROCEDURE — 84439 ASSAY OF FREE THYROXINE: CPT

## 2023-05-23 PROCEDURE — 36415 COLL VENOUS BLD VENIPUNCTURE: CPT

## 2023-05-23 PROCEDURE — 84445 ASSAY OF TSI GLOBULIN: CPT

## 2023-05-23 PROCEDURE — 83550 IRON BINDING TEST: CPT

## 2023-05-24 LAB — THYROGLOB AB SERPL-ACNC: 3.6 IU/ML (ref 0–4)

## 2023-05-25 ENCOUNTER — TELEPHONE (OUTPATIENT)
Dept: MEDICAL GROUP | Facility: PHYSICIAN GROUP | Age: 78
End: 2023-05-25
Payer: MEDICARE

## 2023-05-25 LAB — TSI SER-ACNC: 0.36 IU/L

## 2023-05-25 RX ORDER — LEVOTHYROXINE SODIUM 0.07 MG/1
75 TABLET ORAL
Qty: 100 TABLET | Refills: 3 | Status: SHIPPED | OUTPATIENT
Start: 2023-05-25 | End: 2023-08-18

## 2023-05-26 NOTE — TELEPHONE ENCOUNTER
1. Caller Name: Patrica Qiu                          Call Back Number: [phone] (home) [phone] (work)        How would the patient prefer to be contacted with a response: Phone call OK to leave a detailed message    Called and spoke to patient about upping dose of levothyroxine from 25mcg to 75mcg.  She can take 3 of the pills 25mcg a day to use those up and then take new dose 1 a day in am

## 2023-06-20 ENCOUNTER — PATIENT OUTREACH (OUTPATIENT)
Dept: HEALTH INFORMATION MANAGEMENT | Facility: OTHER | Age: 78
End: 2023-06-20
Payer: MEDICARE

## 2023-06-20 DIAGNOSIS — J44.9 CHRONIC OBSTRUCTIVE PULMONARY DISEASE, UNSPECIFIED COPD TYPE (HCC): ICD-10-CM

## 2023-06-20 PROCEDURE — 99490 CHRNC CARE MGMT STAFF 1ST 20: CPT | Performed by: INTERNAL MEDICINE

## 2023-06-20 NOTE — PROGRESS NOTES
6/20/23 10:30 am: Nurse CM outreach call for monthly CCM assessment. VM left with CM contact number requesting a return call to nurse at [phone].    6/21/23 11:35 am:  Nurse CM second outreach attempt for monthly CCM assessment. VM left with CM contact number requesting a call back to nurse at [phone].    6/21/23 11: 45 am: Pt returned nurse call.     Assessment    Reports not  having much energy. Reports allergies have been bothering her. States when it is windy she has more respiratory problems. Denies feeling short of breath. Reports she is now living with her daughter. Pt states she has all of her medications. Pt reports she will get labs done before next appointment.     Education    Fall prevention, follow-up with specialists. Get labs completed before next appointment.     Plan of Care and Goals    Reviewed care plan and goals    Barriers:    Reports not having a lot of energy    Progress:    Continue to work on progress    Next outreach:  One month  Nurse Care Coordinator: Melissa Vargas  [phone]

## 2023-07-12 ENCOUNTER — TELEPHONE (OUTPATIENT)
Dept: HEALTH INFORMATION MANAGEMENT | Facility: OTHER | Age: 78
End: 2023-07-12
Payer: MEDICARE

## 2023-07-13 PROBLEM — F39 MOOD DISORDER (HCC): Status: ACTIVE | Noted: 2023-07-13

## 2023-07-24 ENCOUNTER — PATIENT OUTREACH (OUTPATIENT)
Dept: HEALTH INFORMATION MANAGEMENT | Facility: OTHER | Age: 78
End: 2023-07-24
Payer: MEDICARE

## 2023-07-24 DIAGNOSIS — J44.9 CHRONIC OBSTRUCTIVE PULMONARY DISEASE, UNSPECIFIED COPD TYPE (HCC): ICD-10-CM

## 2023-07-24 NOTE — PROGRESS NOTES
Nurse CM outreach call for monthly CCM assessment. Pt reports she is in [place] visiting her daughter. States she will return home in August before her scheduled appt with PCP. Nurse LYDIA will follow-up with pt when she returns to [place]. Pt agreeable with plan.

## 2023-07-26 ENCOUNTER — DOCUMENTATION (OUTPATIENT)
Dept: HEALTH INFORMATION MANAGEMENT | Facility: OTHER | Age: 78
End: 2023-07-26
Payer: MEDICARE

## 2023-08-15 ENCOUNTER — OFFICE VISIT (OUTPATIENT)
Dept: MEDICAL GROUP | Facility: PHYSICIAN GROUP | Age: 78
End: 2023-08-15
Payer: MEDICARE

## 2023-08-15 VITALS
BODY MASS INDEX: 35.4 KG/M2 | WEIGHT: 203 LBS | OXYGEN SATURATION: 96 % | TEMPERATURE: 97.6 F | RESPIRATION RATE: 16 BRPM | DIASTOLIC BLOOD PRESSURE: 62 MMHG | SYSTOLIC BLOOD PRESSURE: 124 MMHG | HEART RATE: 77 BPM

## 2023-08-15 DIAGNOSIS — Z11.59 ENCOUNTER FOR HEPATITIS C SCREENING TEST FOR LOW RISK PATIENT: ICD-10-CM

## 2023-08-15 DIAGNOSIS — E78.5 DYSLIPIDEMIA: ICD-10-CM

## 2023-08-15 DIAGNOSIS — J43.1 PANLOBULAR EMPHYSEMA (HCC): ICD-10-CM

## 2023-08-15 DIAGNOSIS — R73.03 PRE-DIABETES: ICD-10-CM

## 2023-08-15 DIAGNOSIS — E03.9 HYPOTHYROIDISM, UNSPECIFIED TYPE: ICD-10-CM

## 2023-08-15 DIAGNOSIS — Z12.31 BREAST CANCER SCREENING BY MAMMOGRAM: ICD-10-CM

## 2023-08-15 DIAGNOSIS — E66.01 SEVERE OBESITY (BMI 35.0-35.9 WITH COMORBIDITY) (HCC): ICD-10-CM

## 2023-08-15 PROCEDURE — 99214 OFFICE O/P EST MOD 30 MIN: CPT | Performed by: INTERNAL MEDICINE

## 2023-08-15 PROCEDURE — 3074F SYST BP LT 130 MM HG: CPT | Performed by: INTERNAL MEDICINE

## 2023-08-15 PROCEDURE — 3078F DIAST BP <80 MM HG: CPT | Performed by: INTERNAL MEDICINE

## 2023-08-15 ASSESSMENT — FIBROSIS 4 INDEX: FIB4 SCORE: 1.68

## 2023-08-15 NOTE — ASSESSMENT & PLAN NOTE
Chronic improving problem, TSH trended down with initiation of levothyroxine, dose increased in May 2023.  She has not yet completed follow-up lab work, she will plan to go do this tomorrow.  Continue levothyroxine 75 mcg daily in the interim.  I will check thyroid peroxidase to screen for Hashimoto's as her daughter also has history of this.
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
## 2023-08-15 NOTE — ASSESSMENT & PLAN NOTE
Chronic decompensated problem.  Weight has increased from 196 pounds to 203 pounds.  Likely related to recent family union for 3 weeks in [place].  Now that she is back home she has more control over what she is eating.  Also likely low thyroid was contributing, will update labs to ensure dosing remains appropriate.

## 2023-08-15 NOTE — ASSESSMENT & PLAN NOTE
Chronic ongoing problem, recheck A1c to ensure stability.  She was up in [place] for 3 weeks her family reunion and notes it was difficult to follow a healthy diet due to the potluck style of the get together.

## 2023-08-15 NOTE — ASSESSMENT & PLAN NOTE
Chronic ongoing issue, has declined statin therapy in the past, may have been falsely increased due to thyroid disease. Recheck and can discuss additional treatment options pending results.

## 2023-08-15 NOTE — ASSESSMENT & PLAN NOTE
Chronic ongoing issue, recheck PFT's, continue Breo and as needed rescue inhaler.  She will follow-up with pulmonary medicine in November and I will see her shortly after.

## 2023-08-17 ENCOUNTER — HOSPITAL ENCOUNTER (OUTPATIENT)
Dept: LAB | Facility: MEDICAL CENTER | Age: 78
End: 2023-08-17
Attending: INTERNAL MEDICINE
Payer: MEDICARE

## 2023-08-17 DIAGNOSIS — E78.5 DYSLIPIDEMIA: ICD-10-CM

## 2023-08-17 DIAGNOSIS — R73.03 PRE-DIABETES: ICD-10-CM

## 2023-08-17 DIAGNOSIS — E03.9 HYPOTHYROIDISM, UNSPECIFIED TYPE: ICD-10-CM

## 2023-08-17 DIAGNOSIS — Z11.59 ENCOUNTER FOR HEPATITIS C SCREENING TEST FOR LOW RISK PATIENT: ICD-10-CM

## 2023-08-17 LAB
ALBUMIN SERPL BCP-MCNC: 4.3 G/DL (ref 3.2–4.9)
ALBUMIN/GLOB SERPL: 1.7 G/DL
ALP SERPL-CCNC: 73 U/L (ref 30–99)
ALT SERPL-CCNC: 39 U/L (ref 2–50)
ANION GAP SERPL CALC-SCNC: 11 MMOL/L (ref 7–16)
AST SERPL-CCNC: 29 U/L (ref 12–45)
BASOPHILS # BLD AUTO: 0.8 % (ref 0–1.8)
BASOPHILS # BLD: 0.06 K/UL (ref 0–0.12)
BILIRUB SERPL-MCNC: 0.4 MG/DL (ref 0.1–1.5)
BUN SERPL-MCNC: 26 MG/DL (ref 8–22)
CALCIUM ALBUM COR SERPL-MCNC: 9.2 MG/DL (ref 8.5–10.5)
CALCIUM SERPL-MCNC: 9.4 MG/DL (ref 8.5–10.5)
CHLORIDE SERPL-SCNC: 100 MMOL/L (ref 96–112)
CHOLEST SERPL-MCNC: 214 MG/DL (ref 100–199)
CO2 SERPL-SCNC: 26 MMOL/L (ref 20–33)
CREAT SERPL-MCNC: 1.02 MG/DL (ref 0.5–1.4)
EOSINOPHIL # BLD AUTO: 0.2 K/UL (ref 0–0.51)
EOSINOPHIL NFR BLD: 2.7 % (ref 0–6.9)
ERYTHROCYTE [DISTWIDTH] IN BLOOD BY AUTOMATED COUNT: 47.7 FL (ref 35.9–50)
EST. AVERAGE GLUCOSE BLD GHB EST-MCNC: 143 MG/DL
FASTING STATUS PATIENT QL REPORTED: NORMAL
GFR SERPLBLD CREATININE-BSD FMLA CKD-EPI: 56 ML/MIN/1.73 M 2
GLOBULIN SER CALC-MCNC: 2.6 G/DL (ref 1.9–3.5)
GLUCOSE SERPL-MCNC: 98 MG/DL (ref 65–99)
HBA1C MFR BLD: 6.6 % (ref 4–5.6)
HCT VFR BLD AUTO: 39 % (ref 37–47)
HCV AB SER QL: NORMAL
HDLC SERPL-MCNC: 37 MG/DL
HGB BLD-MCNC: 12.6 G/DL (ref 12–16)
IMM GRANULOCYTES # BLD AUTO: 0.03 K/UL (ref 0–0.11)
IMM GRANULOCYTES NFR BLD AUTO: 0.4 % (ref 0–0.9)
LDLC SERPL CALC-MCNC: 148 MG/DL
LYMPHOCYTES # BLD AUTO: 2.69 K/UL (ref 1–4.8)
LYMPHOCYTES NFR BLD: 36 % (ref 22–41)
MCH RBC QN AUTO: 30.3 PG (ref 27–33)
MCHC RBC AUTO-ENTMCNC: 32.3 G/DL (ref 32.2–35.5)
MCV RBC AUTO: 93.8 FL (ref 81.4–97.8)
MONOCYTES # BLD AUTO: 0.56 K/UL (ref 0–0.85)
MONOCYTES NFR BLD AUTO: 7.5 % (ref 0–13.4)
NEUTROPHILS # BLD AUTO: 3.93 K/UL (ref 1.82–7.42)
NEUTROPHILS NFR BLD: 52.6 % (ref 44–72)
NRBC # BLD AUTO: 0 K/UL
NRBC BLD-RTO: 0 /100 WBC (ref 0–0.2)
PLATELET # BLD AUTO: 275 K/UL (ref 164–446)
PMV BLD AUTO: 10.6 FL (ref 9–12.9)
POTASSIUM SERPL-SCNC: 4.4 MMOL/L (ref 3.6–5.5)
PROT SERPL-MCNC: 6.9 G/DL (ref 6–8.2)
RBC # BLD AUTO: 4.16 M/UL (ref 4.2–5.4)
SODIUM SERPL-SCNC: 137 MMOL/L (ref 135–145)
T4 FREE SERPL-MCNC: 1.27 NG/DL (ref 0.93–1.7)
THYROPEROXIDASE AB SERPL-ACNC: 268 IU/ML (ref 0–9)
TRIGL SERPL-MCNC: 144 MG/DL (ref 0–149)
TSH SERPL DL<=0.005 MIU/L-ACNC: 6.5 UIU/ML (ref 0.38–5.33)
WBC # BLD AUTO: 7.5 K/UL (ref 4.8–10.8)

## 2023-08-17 PROCEDURE — 84439 ASSAY OF FREE THYROXINE: CPT

## 2023-08-17 PROCEDURE — 86803 HEPATITIS C AB TEST: CPT

## 2023-08-17 PROCEDURE — 80053 COMPREHEN METABOLIC PANEL: CPT

## 2023-08-17 PROCEDURE — 85025 COMPLETE CBC W/AUTO DIFF WBC: CPT

## 2023-08-17 PROCEDURE — 36415 COLL VENOUS BLD VENIPUNCTURE: CPT

## 2023-08-17 PROCEDURE — 86376 MICROSOMAL ANTIBODY EACH: CPT

## 2023-08-17 PROCEDURE — 80061 LIPID PANEL: CPT

## 2023-08-17 PROCEDURE — 83036 HEMOGLOBIN GLYCOSYLATED A1C: CPT

## 2023-08-17 PROCEDURE — 84443 ASSAY THYROID STIM HORMONE: CPT

## 2023-08-18 DIAGNOSIS — E03.8 HYPOTHYROIDISM DUE TO HASHIMOTO'S THYROIDITIS: ICD-10-CM

## 2023-08-18 DIAGNOSIS — E06.3 HYPOTHYROIDISM DUE TO HASHIMOTO'S THYROIDITIS: ICD-10-CM

## 2023-08-18 RX ORDER — LEVOTHYROXINE SODIUM 88 UG/1
88 TABLET ORAL
Qty: 100 TABLET | Refills: 3 | Status: SHIPPED | OUTPATIENT
Start: 2023-08-18

## 2023-08-21 NOTE — RESULT ENCOUNTER NOTE
Called and left a message with details and to go over her My Chart message as you lined out what I verbally left on the message.  Call me if you have any questions.

## 2023-08-24 ENCOUNTER — PATIENT OUTREACH (OUTPATIENT)
Dept: HEALTH INFORMATION MANAGEMENT | Facility: OTHER | Age: 78
End: 2023-08-24
Payer: MEDICARE

## 2023-08-24 DIAGNOSIS — J45.901 ASTHMA WITH ACUTE EXACERBATION, UNSPECIFIED ASTHMA SEVERITY, UNSPECIFIED WHETHER PERSISTENT: ICD-10-CM

## 2023-08-24 DIAGNOSIS — J44.9 CHRONIC OBSTRUCTIVE PULMONARY DISEASE, UNSPECIFIED COPD TYPE (HCC): ICD-10-CM

## 2023-08-24 DIAGNOSIS — J44.1 CHRONIC OBSTRUCTIVE PULMONARY DISEASE WITH ACUTE EXACERBATION (HCC): ICD-10-CM

## 2023-08-24 PROCEDURE — 99490 CHRNC CARE MGMT STAFF 1ST 20: CPT | Performed by: INTERNAL MEDICINE

## 2023-08-24 NOTE — PROGRESS NOTES
Nurse CM outreach call for monthly CCM assessment.    Assessment    Pt reports she is doing better. States she had recent problem with having leg cramps. Reports PCP prescribed muscle relaxer, cyclobenzaprine. Pt reports she is taking 5 mg tablet as needed. States she has been drinking plenty of water and also taking magnesium. Pt reports no respiratory problems. Reports she is taking medications as directed. Pt is scheduled to have PFT's in September. Annual case review completed. Pt would like to continue in CCM program. Reports she does find it beneficial receiving monthly call from nurse.     Education    Fall precautions. Monitor respiratory symptoms. Notify pulmonary provider if any worsening COPD symptoms.    Plan of Care and Goals    Reviewed care plan and goals    Barriers:    Chronic health conditions.    Progress:    Continue to work on progress    Next outreach:  One month  Nurse Care Coordinator:  Melissa Vargas  [phone]

## 2023-09-13 ENCOUNTER — HOSPITAL ENCOUNTER (OUTPATIENT)
Dept: PULMONOLOGY | Facility: MEDICAL CENTER | Age: 78
End: 2023-09-13
Attending: INTERNAL MEDICINE
Payer: MEDICARE

## 2023-09-13 DIAGNOSIS — J43.1 PANLOBULAR EMPHYSEMA (HCC): ICD-10-CM

## 2023-09-13 PROCEDURE — 94729 DIFFUSING CAPACITY: CPT

## 2023-09-13 PROCEDURE — 94060 EVALUATION OF WHEEZING: CPT

## 2023-09-13 PROCEDURE — 94726 PLETHYSMOGRAPHY LUNG VOLUMES: CPT

## 2023-09-13 RX ADMIN — Medication 2.5 MG: at 10:04

## 2023-09-13 ASSESSMENT — PULMONARY FUNCTION TESTS
FEV1/FVC: 71
FVC_PERCENT_PREDICTED: 90
FEV1/FVC: 72
FEV1_LLN: 1.62
FEV1/FVC_PERCENT_PREDICTED: 92
FEV1/FVC_PREDICTED: 77
FEV1/FVC_PERCENT_CHANGE: -1
FVC_LLN: 2.12
FEV1/FVC_PERCENT_LLN: 65
FVC_PREDICTED: 2.54
FEV1: 1.63
FEV1/FVC_PERCENT_PREDICTED: 91
FEV1_PERCENT_PREDICTED: 84
FVC: 2.3
FEV1/FVC: 72
FEV1_PERCENT_CHANGE: 0
FEV1_LLN: 1.62
FVC: 2.29
FEV1: 1.64
FEV1_PERCENT_CHANGE: 0
FEV1/FVC_PERCENT_PREDICTED: 77
FEV1/FVC_PERCENT_PREDICTED: 92
FEV1/FVC_PERCENT_PREDICTED: 93
FVC_LLN: 2.12
FEV1_PREDICTED: 1.95
FEV1_PERCENT_PREDICTED: 83
FEV1/FVC: 70.87
FEV1/FVC_PERCENT_LLN: 65
FVC_PERCENT_PREDICTED: 90

## 2023-09-14 PROCEDURE — 94729 DIFFUSING CAPACITY: CPT | Mod: 26 | Performed by: INTERNAL MEDICINE

## 2023-09-14 PROCEDURE — 94726 PLETHYSMOGRAPHY LUNG VOLUMES: CPT | Mod: 26 | Performed by: INTERNAL MEDICINE

## 2023-09-14 PROCEDURE — 94060 EVALUATION OF WHEEZING: CPT | Mod: 26 | Performed by: INTERNAL MEDICINE

## 2023-09-15 NOTE — PROCEDURES
DATE OF SERVICE:  09/13/2023     PULMONARY FUNCTION TEST INTERPRETATION     Normal baseline spirometry with a negative bronchodilator response.  Flow volume loops demonstrate mild scooping of the expiratory loop.  Full lung volumes demonstrate air trapping.  There is also significant reduction in ERV secondary to BMI of 36.3.  The DLCO is normal.    Summary:  Findings of air trapping in addition to chronic atelectasis from obesity.  Correlate clinically and with imaging.  Weight loss should be advised        ______________________________  DO SUE MARTIN/LASHELL    DD:  09/14/2023 16:36  DT:  09/14/2023 17:14    Job#:  [id number]

## 2023-09-22 ENCOUNTER — PATIENT OUTREACH (OUTPATIENT)
Dept: HEALTH INFORMATION MANAGEMENT | Facility: OTHER | Age: 78
End: 2023-09-22
Payer: MEDICARE

## 2023-09-22 DIAGNOSIS — J44.9 CHRONIC OBSTRUCTIVE PULMONARY DISEASE, UNSPECIFIED COPD TYPE (HCC): ICD-10-CM

## 2023-09-22 DIAGNOSIS — J45.901 ASTHMA WITH ACUTE EXACERBATION, UNSPECIFIED ASTHMA SEVERITY, UNSPECIFIED WHETHER PERSISTENT: ICD-10-CM

## 2023-09-22 PROCEDURE — 99999 PR NO CHARGE: CPT | Performed by: INTERNAL MEDICINE

## 2023-09-22 NOTE — PROGRESS NOTES
Nurse CM outreach call for monthly CCM assessment.     Assessment    Pt reports she has been doing well.  Pt reports she has been working on improving her diet. States her daughter has been cooking  keto diet. Pt trying to reduce carbohydrates and sugar in diet. Pt reports she is trying to keep active. Reports she continues to have intermittent leg cramps at times. Reports PCP did order some flexeril as needed. Pt states she is taking melatonin and magnesium at night if leg cramps. Denies any current care management needs. Pt completed PFT's and is aware of interpretation from PCP.    Education    Continue to follow with specialists.  Continue to work on following healthy meal plan.     Plan of Care and Goals    Reviewed care plan and goals    Barriers:    Chronic health conditions    Progress:    Continue to work on progress    Next outreach:  One month  Nurse Care Coordinator:  Melissa Vargas  [phone]

## 2023-10-26 ENCOUNTER — PATIENT OUTREACH (OUTPATIENT)
Dept: HEALTH INFORMATION MANAGEMENT | Facility: OTHER | Age: 78
End: 2023-10-26
Payer: MEDICARE

## 2023-10-26 DIAGNOSIS — J45.901 ASTHMA WITH ACUTE EXACERBATION, UNSPECIFIED ASTHMA SEVERITY, UNSPECIFIED WHETHER PERSISTENT: ICD-10-CM

## 2023-10-26 DIAGNOSIS — J44.1 CHRONIC OBSTRUCTIVE PULMONARY DISEASE WITH ACUTE EXACERBATION (HCC): ICD-10-CM

## 2023-10-26 DIAGNOSIS — J44.9 CHRONIC OBSTRUCTIVE PULMONARY DISEASE, UNSPECIFIED COPD TYPE (HCC): ICD-10-CM

## 2023-10-26 PROCEDURE — 99490 CHRNC CARE MGMT STAFF 1ST 20: CPT | Performed by: INTERNAL MEDICINE

## 2023-10-26 NOTE — PROGRESS NOTES
Nurse CM outreach call to patient for monthly CCM assessment.  Pt answered telephone.    Assessment    Pt reports she is doing okay. Reports she continues to have some leg cramping at night. Reports she will occasionally take flexeril as ordered by PCP. States it does help with sleeping. Reports leg cramps may be related to not drinking enough fluids. States she is trying to increase her fluid intakes. Pt reports she is using her albuterol inhaler two time a day if she is feeling short of breath. States usually shortness of breath is with exertion.     Education    Stay well hydrated and increase water intake if exercising. Continue to monitor respiratory symptoms. Follow-up with pulmonary provider as scheduled. Continue to take medications as recommended.     Plan of Care and Goals    Reviewed care plan and goals    Barriers:    Feeling short of breath with exertion    Progress:    Continue to work on progress    Next outreach:  One month  Nurse Care Coordinator:  Melissa Vargas  [phone]

## 2023-11-09 ENCOUNTER — OFFICE VISIT (OUTPATIENT)
Dept: SLEEP MEDICINE | Facility: MEDICAL CENTER | Age: 78
End: 2023-11-09
Attending: INTERNAL MEDICINE
Payer: MEDICARE

## 2023-11-09 VITALS
OXYGEN SATURATION: 97 % | BODY MASS INDEX: 35.68 KG/M2 | DIASTOLIC BLOOD PRESSURE: 72 MMHG | SYSTOLIC BLOOD PRESSURE: 126 MMHG | HEART RATE: 72 BPM | HEIGHT: 64 IN | WEIGHT: 209 LBS

## 2023-11-09 DIAGNOSIS — Z23 ENCOUNTER FOR IMMUNIZATION: ICD-10-CM

## 2023-11-09 DIAGNOSIS — J45.40 MODERATE PERSISTENT ASTHMA WITHOUT COMPLICATION: ICD-10-CM

## 2023-11-09 DIAGNOSIS — Z87.891 HX OF SMOKING: ICD-10-CM

## 2023-11-09 PROCEDURE — 99215 OFFICE O/P EST HI 40 MIN: CPT | Performed by: INTERNAL MEDICINE

## 2023-11-09 PROCEDURE — 99212 OFFICE O/P EST SF 10 MIN: CPT | Mod: 25 | Performed by: INTERNAL MEDICINE

## 2023-11-09 PROCEDURE — 90662 IIV NO PRSV INCREASED AG IM: CPT

## 2023-11-09 PROCEDURE — 3074F SYST BP LT 130 MM HG: CPT | Performed by: INTERNAL MEDICINE

## 2023-11-09 PROCEDURE — 3078F DIAST BP <80 MM HG: CPT | Performed by: INTERNAL MEDICINE

## 2023-11-09 ASSESSMENT — FIBROSIS 4 INDEX: FIB4 SCORE: 1.3
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
## 2023-11-09 NOTE — PATIENT INSTRUCTIONS
I have referred you to Dr. Catherine Tavera for lung cancer screening.   Please get your RSV. This may not prevent you from getting RSV, but it reduces your risk of hospitalization or death from double pneumonia caused by RSV.  You are a high risk patient.  Please get your COVID booster vaccine as soon as possible for your protection.  You are a high risk patient.

## 2023-11-16 ENCOUNTER — OFFICE VISIT (OUTPATIENT)
Dept: MEDICAL GROUP | Facility: PHYSICIAN GROUP | Age: 78
End: 2023-11-16
Payer: MEDICARE

## 2023-11-16 VITALS
HEIGHT: 64 IN | BODY MASS INDEX: 35.9 KG/M2 | DIASTOLIC BLOOD PRESSURE: 78 MMHG | OXYGEN SATURATION: 96 % | HEART RATE: 76 BPM | WEIGHT: 210.3 LBS | TEMPERATURE: 97.9 F | SYSTOLIC BLOOD PRESSURE: 138 MMHG

## 2023-11-16 DIAGNOSIS — M81.0 AGE-RELATED OSTEOPOROSIS WITHOUT CURRENT PATHOLOGICAL FRACTURE: ICD-10-CM

## 2023-11-16 DIAGNOSIS — M54.50 CHRONIC BILATERAL LOW BACK PAIN WITHOUT SCIATICA: ICD-10-CM

## 2023-11-16 DIAGNOSIS — E78.5 DYSLIPIDEMIA: ICD-10-CM

## 2023-11-16 DIAGNOSIS — G89.29 CHRONIC BILATERAL LOW BACK PAIN WITHOUT SCIATICA: ICD-10-CM

## 2023-11-16 DIAGNOSIS — E03.9 HYPOTHYROIDISM, UNSPECIFIED TYPE: ICD-10-CM

## 2023-11-16 DIAGNOSIS — R73.03 PRE-DIABETES: ICD-10-CM

## 2023-11-16 PROCEDURE — 99214 OFFICE O/P EST MOD 30 MIN: CPT | Performed by: INTERNAL MEDICINE

## 2023-11-16 PROCEDURE — 3078F DIAST BP <80 MM HG: CPT | Performed by: INTERNAL MEDICINE

## 2023-11-16 PROCEDURE — 3075F SYST BP GE 130 - 139MM HG: CPT | Performed by: INTERNAL MEDICINE

## 2023-11-16 RX ORDER — ALENDRONATE SODIUM 70 MG/1
70 TABLET ORAL
Qty: 12 TABLET | Refills: 3 | Status: SHIPPED | OUTPATIENT
Start: 2023-11-16 | End: 2024-02-12

## 2023-11-16 ASSESSMENT — FIBROSIS 4 INDEX: FIB4 SCORE: 1.3

## 2023-11-16 NOTE — ASSESSMENT & PLAN NOTE
Chronic decompensated problem, will refer her for aquatic physical therapy to help with osteoporosis as well as low back pain and improve her endurance.  She will also look into gyms covered by [facility] so she can start getting into an exercise routine.

## 2023-11-16 NOTE — ASSESSMENT & PLAN NOTE
Patient has had a recent elevation of A1c.  Has been eating a higher protein diet and being more strict with carbohydrates and sugars.  Recheck A1c before next visit and then can discuss pharmacotherapy at that time.

## 2023-11-16 NOTE — ASSESSMENT & PLAN NOTE
Chronic ongoing problem, has declined statins in the past, will recheck lipids before next visit and can can consider CT cardiac score and discuss treatment recommendations.

## 2023-11-16 NOTE — ASSESSMENT & PLAN NOTE
Chronic improving problem, free T4 in the normal range and TSH continues to improve, continue levothyroxine 88 mcg daily and recheck thyroid labs before next visit.

## 2023-11-16 NOTE — PROGRESS NOTES
Subjective:   Chief Complaint/History of Present Illness:  Patrica Qiu is a 77 y.o. female established patient who presents today to discuss medical problems as listed below. Patrica is unaccompanied for today's visit.  Problem   Hypothyroidism     Latest Reference Range & Units 08/17/23 07:22   TSH 0.380 - 5.330 uIU/mL 6.500 (H)   Free T-4 0.93 - 1.70 ng/dL 1.27       New finding in April 2023.  She has associated depression and lethargy.  She has a daughter with Hashimoto's.  Normal testing in September 2022.  No preceding viral infections that she recalls.  Feels better since we initiated levothyroxine.    Current regimen: Levothyroxine 88 mcg daily  Previous regimen: Levothyroxine 25-75 mcg daily     Dyslipidemia     Latest Reference Range & Units 08/17/23 07:22   Cholesterol,Tot 100 - 199 mg/dL 214 (H)   Triglycerides 0 - 149 mg/dL 144   HDL >=40 mg/dL 37 !   LDL <100 mg/dL 148 (H)       The 10-year ASCVD risk score (Tam DK, et al., 2019) is: 21.7%     Suggested to takes statins in the past and declined, prefers natural or lifestyle treatments to her health.     Chronic Low Back Pain    She has longstanding history of low back pain.  She worked as a  for over 30 years and has been very physically active.  X-ray imaging of the low back demonstrate retrolisthesis of L5 and degeneration from L4-L5-S1.  She uses a her a cane for stability.  Did not find physical therapy when she lasted in [place] very helpful full but open to trial with aquatic PT.     Osteoporosis Without Current Pathological Fracture    Bone Density (11/2022):   lumbar spine T score of -2.5   proximal left femur T score of -0.5      When compared with the most recent study dated 4/9/2007, there has been a 11.8% decrease in the bone mineral density of the proximal left femur.    10-year Probability of Fracture:  Major Osteoporotic     11.1%  Hip     2.1%    She is taking calcium vitamin D.  No known history of fragility  fractures.      As of mid November 2023 she is agreeable to trial with alendronate 70 mg weekly.     Pre-Diabetes     Latest Reference Range & Units 08/17/23 07:22   Glycohemoglobin 4.0 - 5.6 % 6.6 (H)   Estim. Avg Glu mg/dL 143       She reports diagnosis of prediabetes dating back to her 30s or 40s.  She is never required glucose lowering medications.          Current Medications:  Current Outpatient Medications Ordered in Epic   Medication Sig Dispense Refill    alendronate (FOSAMAX) 70 MG Tab Take 1 Tablet by mouth every 7 days. 12 Tablet 3    cyclobenzaprine (FLEXERIL) 5 mg tablet Take 1-2 Tablets by mouth every evening. 200 Tablet 3    levothyroxine (SYNTHROID) 88 MCG Tab Take 1 Tablet by mouth every morning on an empty stomach. 100 Tablet 3    Sodium Hyaluronate, oral, (HYALURONIC ACID PO) Take 1 Tablet by mouth every day.      NON SPECIFIED Take 1 Tablet by mouth 2 times a day. S-equol + Calcium, menopause seupplemnt Equelle      fluticasone furoate-vilanterol (BREO ELLIPTA) 200-25 MCG/ACT AEROSOL POWDER, BREATH ACTIVATED Inhale 1 Puff every day. 3 Each 3    DIGESTIVE AIDS MIXTURE PO Take  by mouth.      albuterol 108 (90 Base) MCG/ACT Aero Soln inhalation aerosol Inhale 2 Puffs every 6 hours as needed for Shortness of Breath. 3 Each 3    Ascorbic Acid (VITAMIN C) 1000 MG Tab Take 1 Tablet by mouth.      multivitamin (THERAGRAN) Tab Take 1 Tablet by mouth every day.      cyanocobalamin (VITAMIN B12) 1000 MCG Tab Take 1,000 mcg by mouth every day. Only taking 500 MCG currently      vitamin D3 (CHOLECALCIFEROL) 1000 Unit (25 mcg) Tab Take 1 Tablet by mouth every day.      vitamin e (VITAMIN E) 400 UNIT Cap Take 1 Capsule by mouth every day.      calcium citrate (CALCITRATE) 950 (200 Ca) MG Tab Take 1 Tablet by mouth every day.      loratadine (CLARITIN) 10 MG Tab Take 1 Tablet by mouth every day.       No current [facility]-ordered facility-administered medications on file.          Objective:   Physical Exam:  "   Vitals: /78 (BP Location: Right arm, Patient Position: Sitting)   Pulse 76   Temp 36.6 °C (97.9 °F) (Temporal)   Ht 1.626 m (5' 4\")   Wt 95.4 kg (210 lb 4.8 oz)   SpO2 96%    BMI: Body mass index is 36.1 kg/m².  Physical Exam  Constitutional:       General: She is not in acute distress.     Appearance: Normal appearance. She is not ill-appearing.   HENT:      Right Ear: External ear normal.      Left Ear: External ear normal.   Eyes:      General: No scleral icterus.     Conjunctiva/sclera: Conjunctivae normal.   Cardiovascular:      Rate and Rhythm: Normal rate and regular rhythm.      Pulses: Normal pulses.   Pulmonary:      Effort: Pulmonary effort is normal. No respiratory distress.      Breath sounds: No rhonchi.   Abdominal:      General: Bowel sounds are normal.      Palpations: Abdomen is soft.   Lymphadenopathy:      Cervical: No cervical adenopathy.   Skin:     General: Skin is warm and dry.      Findings: No rash.   Neurological:      Gait: Gait abnormal.      Comments: Using a cane   Psychiatric:         Mood and Affect: Mood normal.         Behavior: Behavior normal.         Thought Content: Thought content normal.         Judgment: Judgment normal.            Assessment and Plan:   Patrica is a 77 y.o. female with the following:  Problem List Items Addressed This Visit       Chronic low back pain     Chronic decompensated problem, will refer her for aquatic physical therapy to help with osteoporosis as well as low back pain and improve her endurance.  She will also look into gyms covered by [facility] so she can start getting into an exercise routine.         Relevant Orders    Referral to Physical Therapy    Dyslipidemia     Chronic ongoing problem, has declined statins in the past, will recheck lipids before next visit and can can consider CT cardiac score and discuss treatment recommendations.         Relevant Orders    VITAMIN B12    VITAMIN D,25 HYDROXY (DEFICIENCY)    Lipid " Profile    MICROALBUMIN CREAT RATIO URINE    Comp Metabolic Panel    CBC WITH DIFFERENTIAL    Hypothyroidism     Chronic improving problem, free T4 in the normal range and TSH continues to improve, continue levothyroxine 88 mcg daily and recheck thyroid labs before next visit.         Relevant Orders    FREE THYROXINE    TSH    MICROALBUMIN CREAT RATIO URINE    Osteoporosis without current pathological fracture     Chronic decompensated problem, agreeable to trial of alendronate 70 mg weekly, discussed side effects in detail.  Continue calcium and vitamin D.  Next bone density due in November 2024.         Relevant Medications    alendronate (FOSAMAX) 70 MG Tab    Pre-diabetes     Patient has had a recent elevation of A1c.  Has been eating a higher protein diet and being more strict with carbohydrates and sugars.  Recheck A1c before next visit and then can discuss pharmacotherapy at that time.         Relevant Orders    HEMOGLOBIN A1C    MICROALBUMIN CREAT RATIO URINE          RTC: Return in about 3 months (around 2/16/2024).    I spent a total of 32 minutes with record review, exam, communication with the patient, communication with other providers, and documentation of this encounter.    PLEASE NOTE: This dictation was created using voice recognition software. I have made every reasonable attempt to correct obvious errors, but I expect that there are errors of grammar and possibly content that I did not discover before finalizing the note.      Jackelyn Trujillo, DO  Geriatric and Internal Medicine  [facility]

## 2023-11-16 NOTE — ASSESSMENT & PLAN NOTE
Chronic decompensated problem, agreeable to trial of alendronate 70 mg weekly, discussed side effects in detail.  Continue calcium and vitamin D.  Next bone density due in November 2024.

## 2023-11-17 ENCOUNTER — APPOINTMENT (OUTPATIENT)
Dept: RADIOLOGY | Facility: MEDICAL CENTER | Age: 78
End: 2023-11-17
Attending: INTERNAL MEDICINE
Payer: MEDICARE

## 2023-11-17 DIAGNOSIS — Z12.31 BREAST CANCER SCREENING BY MAMMOGRAM: ICD-10-CM

## 2023-11-17 PROCEDURE — 77063 BREAST TOMOSYNTHESIS BI: CPT

## 2023-11-21 ENCOUNTER — TELEPHONE (OUTPATIENT)
Dept: SLEEP MEDICINE | Facility: MEDICAL CENTER | Age: 78
End: 2023-11-21
Payer: MEDICARE

## 2023-11-21 NOTE — TELEPHONE ENCOUNTER
1. Age 50-77 yrs of age? 77 yrs    2. Total Years Smoking cigarettes? 40 yrs    3. 20 pack year hx of smoking, or greater (average packs per day)?   20 pk yrs @ 0.5 pk/day     4. Current smoker or if quit, has pt quit within last 15 yrs?  Quit 1.5 yrs     5. Completed Lung Cancer screen CT with [facility] previously?  NONE    6. Anything noted on previous CT involving lungs? ([place]odules, Mass, Etc.)  NONE    7. Any signs or symptoms of lung cancer? ( New / change to Cough, Wheezing, S.O.B., coughing up blood, unexplained weight loss within last year)?   NONE    8. Previous history of lung cancer?   NONE

## 2023-11-22 NOTE — PROGRESS NOTES
Subjective     Patrica Qiu is a 77 y.o. female who presents with Lung Cancer Screening Program Prescreen            HPI  Patient seen today for initial lung cancer screening visit. Patient referred by her pulmonologist, Dr. Smith.  Her PCP is Dr. Jackelyn Trujillo.     The patient meets eligibility criteria including age, smoking history (20+ pack years), if former smoker, quit in the last 15 years, and absence of signs or symptoms of lung cancer.    - Age - 77  - Smoking history - Patient has smoked for 42 years at an average of 0.5 ppd = 21 pack year smoking history.  - Current smoking status - former smoker, quit smoking around 6/2022  - No symptoms of lung cancer and no previous history of lung cancer     No Known Allergies    Current Outpatient Medications on File Prior to Visit   Medication Sig Dispense Refill    alendronate (FOSAMAX) 70 MG Tab Take 1 Tablet by mouth every 7 days. 12 Tablet 3    cyclobenzaprine (FLEXERIL) 5 mg tablet Take 1-2 Tablets by mouth every evening. 200 Tablet 3    levothyroxine (SYNTHROID) 88 MCG Tab Take 1 Tablet by mouth every morning on an empty stomach. 100 Tablet 3    Sodium Hyaluronate, oral, (HYALURONIC ACID PO) Take 1 Tablet by mouth every day.      NON SPECIFIED Take 1 Tablet by mouth 2 times a day. S-equol + Calcium, menopause seupplemnt Equelle      fluticasone furoate-vilanterol (BREO ELLIPTA) 200-25 MCG/ACT AEROSOL POWDER, BREATH ACTIVATED Inhale 1 Puff every day. 3 Each 3    DIGESTIVE AIDS MIXTURE PO Take  by mouth.      albuterol 108 (90 Base) MCG/ACT Aero Soln inhalation aerosol Inhale 2 Puffs every 6 hours as needed for Shortness of Breath. 3 Each 3    Ascorbic Acid (VITAMIN C) 1000 MG Tab Take 1 Tablet by mouth.      multivitamin (THERAGRAN) Tab Take 1 Tablet by mouth every day.      cyanocobalamin (VITAMIN B12) 1000 MCG Tab Take 1,000 mcg by mouth every day. Only taking 500 MCG currently      vitamin D3 (CHOLECALCIFEROL) 1000 Unit (25 mcg) Tab Take 1  "Tablet by mouth every day.      vitamin e (VITAMIN E) 400 UNIT Cap Take 1 Capsule by mouth every day.      calcium citrate (CALCITRATE) 950 (200 Ca) MG Tab Take 1 Tablet by mouth every day.      loratadine (CLARITIN) 10 MG Tab Take 1 Tablet by mouth every day.       No current facility-administered medications on file prior to visit.       Review of Systems   Constitutional:  Negative for chills, fever and weight loss.   Respiratory:  Positive for cough, shortness of breath and wheezing. Negative for hemoptysis and sputum production.    Cardiovascular:  Negative for chest pain and palpitations.   She has a chronic dry cough for more than 1 year without recent changes.  She has asthma with chronic intermittent SOB and wheezing with exertion that improves with using her inhaler and rest.  Currently she only has symptoms when being active and not using her inhaler.  She has been more active than normal recently and avoids using her inhaler, but when she uses her inhaler symptoms improve.       Objective     /70 (BP Location: Right arm, Patient Position: Sitting, BP Cuff Size: Adult)   Pulse 80   Resp 20   Ht 1.6 m (5' 3\")   Wt 95.7 kg (211 lb)   SpO2 97%   BMI 37.38 kg/m²      Physical Exam  Constitutional:       Appearance: Normal appearance.   Cardiovascular:      Rate and Rhythm: Normal rate and regular rhythm.   Pulmonary:      Effort: Pulmonary effort is normal. No respiratory distress.      Breath sounds: No stridor. Wheezing present. No rhonchi or rales.   Musculoskeletal:         General: No swelling.   Neurological:      Mental Status: She is alert.     Faint RUL wheezing            Assessment & Plan        1. Personal history of tobacco use, presenting hazards to health  CT-LUNG CANCER-SCREENING             We conducted a shared decision-making process using a decision aid. We reviewed benefits and harms of screening, including false positives and potential need for additional diagnostic testing, " the possibility of over diagnosis, and total radiation exposure.    We discussed the importance of adhering to annual LDCT screening. We also discussed the impact of comorbities on the patient's the ability or willingness to undergo diagnostic procedure(s) and treatment.    Counseling on the importance of maintaining cigarette smoking abstinence if former smoker; or the importance of smoking cessation if current smoker and, if appropriate, furnishing of information about tobacco cessation interventions.    Based on our discussion, we have decided to begin annual lung cancer screening starting now.    No follow-up needed unless imaging is abnormal

## 2023-11-27 ENCOUNTER — OFFICE VISIT (OUTPATIENT)
Dept: SLEEP MEDICINE | Facility: MEDICAL CENTER | Age: 78
End: 2023-11-27
Attending: FAMILY MEDICINE
Payer: MEDICARE

## 2023-11-27 ENCOUNTER — PATIENT OUTREACH (OUTPATIENT)
Dept: HEALTH INFORMATION MANAGEMENT | Facility: OTHER | Age: 78
End: 2023-11-27

## 2023-11-27 VITALS
DIASTOLIC BLOOD PRESSURE: 70 MMHG | BODY MASS INDEX: 37.39 KG/M2 | HEIGHT: 63 IN | SYSTOLIC BLOOD PRESSURE: 122 MMHG | OXYGEN SATURATION: 97 % | WEIGHT: 211 LBS | RESPIRATION RATE: 20 BRPM | HEART RATE: 80 BPM

## 2023-11-27 DIAGNOSIS — Z87.891 PERSONAL HISTORY OF TOBACCO USE, PRESENTING HAZARDS TO HEALTH: ICD-10-CM

## 2023-11-27 DIAGNOSIS — J44.9 CHRONIC OBSTRUCTIVE PULMONARY DISEASE, UNSPECIFIED COPD TYPE (HCC): ICD-10-CM

## 2023-11-27 DIAGNOSIS — J44.1 CHRONIC OBSTRUCTIVE PULMONARY DISEASE WITH ACUTE EXACERBATION (HCC): ICD-10-CM

## 2023-11-27 PROCEDURE — 3078F DIAST BP <80 MM HG: CPT | Performed by: FAMILY MEDICINE

## 2023-11-27 PROCEDURE — G0296 VISIT TO DETERM LDCT ELIG: HCPCS | Performed by: FAMILY MEDICINE

## 2023-11-27 PROCEDURE — 99999 PR NO CHARGE: CPT | Performed by: INTERNAL MEDICINE

## 2023-11-27 PROCEDURE — 3074F SYST BP LT 130 MM HG: CPT | Performed by: FAMILY MEDICINE

## 2023-11-27 RX ORDER — ALBUTEROL SULFATE 90 UG/1
2 AEROSOL, METERED RESPIRATORY (INHALATION) EVERY 6 HOURS PRN
Qty: 3 EACH | Refills: 3 | Status: SHIPPED | OUTPATIENT
Start: 2023-11-27 | End: 2024-03-18 | Stop reason: SDUPTHER

## 2023-11-27 ASSESSMENT — ENCOUNTER SYMPTOMS
PALPITATIONS: 0
SHORTNESS OF BREATH: 1
FEVER: 0
CHILLS: 0
WHEEZING: 1
COUGH: 1
HEMOPTYSIS: 0
SPUTUM PRODUCTION: 0
WEIGHT LOSS: 0

## 2023-11-27 ASSESSMENT — FIBROSIS 4 INDEX: FIB4 SCORE: 1.3

## 2023-11-27 NOTE — PROGRESS NOTES
Nurse CM outreach call for monthly CCM assessment. Pt answered telephone.     Assessment    Pt reports she is doing well. States recent follow-up with pulmonary. Pt reports she currently has all of her medications. Denies any current respiratory problems. Pt states she is getting a CT-lung cancer screening this week.     Education    Continue to follow with specialists. Take medications as directed. Follow heart healthy meal plan.    Plan of Care and Goals    Reviewed care plan and goals. Pt has met goals. Current risk score is now 2. Pt in agreement to discontinue CCM program at this time.     Barriers:    Reports no current barriers    Progress:    Pt will continue to work on progress    Next outreach: Program completed

## 2023-11-27 NOTE — Clinical Note
Thank you for referring Patrica to the Lung Cancer Screening program.  I enrolled her today. I will update you re: abnormal findings. -Dr. Nayeli Tavera

## 2023-11-30 ENCOUNTER — HOSPITAL ENCOUNTER (OUTPATIENT)
Dept: RADIOLOGY | Facility: MEDICAL CENTER | Age: 78
End: 2023-11-30
Attending: FAMILY MEDICINE
Payer: MEDICARE

## 2023-11-30 DIAGNOSIS — Z87.891 PERSONAL HISTORY OF TOBACCO USE, PRESENTING HAZARDS TO HEALTH: ICD-10-CM

## 2023-11-30 PROCEDURE — 71271 CT THORAX LUNG CANCER SCR C-: CPT

## 2023-12-04 ENCOUNTER — TELEPHONE (OUTPATIENT)
Dept: SLEEP MEDICINE | Facility: MEDICAL CENTER | Age: 78
End: 2023-12-04
Payer: MEDICARE

## 2023-12-04 NOTE — TELEPHONE ENCOUNTER
Please let patient know that her CT scan had a few incidental findings including fatty liver, emphysema, and calcifications in the coronary arteries. These can all be followed up with me but if she wants to review recommendations sooner we can move up our next scheduled visit.

## 2023-12-04 NOTE — TELEPHONE ENCOUNTER
Phoned patient with results of LDCT exam performed 11/30/23.  She did not answer, so I sent her a [employer] message.    Notified her that the results showed no concerning lung nodules  Recommend follow up CT in 12 months.    Informed patient of incidental findings of hepatic steatosis, emphysematous changes and coronary artery calcifications with recommendation to follow up with PCP.    Referring provider Dr. Trujillo notified of results and incidental findings via this communication.    Health maintenance updated and patient sent lung cancer screening result letter.        CT-LUNG CANCER-SCREENING    Result Date: 12/1/2023 11/30/2023 10:42 AM HISTORY/REASON FOR EXAM:  77-year-old former smoker with 21 pack-year history of smoking. TECHNIQUE/EXAM DESCRIPTION AND NUMBER OF VIEWS: Lung cancer screening without contrast. Low dose noncontrast helical images were obtained of the chest from the lung apices through the costophrenic sulci utilizing thin collimation and intervals with reconstructed images sent to PACS in axial, coronal and sagittal planes. Low dose optimization technique was utilized for this CT exam including automated exposure control and adjustment of the mA and/or kV according to patient size. COMPARISON: 7/15/2022 chest CT FINDINGS: Lungs: No nodule or airspace process. Emphysema is noted.. Mediastinum/Nora: No adenopathy.. Pleura: No pleural effusion.. Cardiac: Heart normal in size. There is coronary artery calcification. Vascular: Aorta is nonaneurysmal.. Soft tissues: Unremarkable.. Upper abdomen: Limited visualized portion of the upper abdomen demonstrates no acute finding. Hepatic steatosis noted. This study was performed without contrast and with lower than standard radiation-induced. These factors reduce the sensitivity for detection of small lesions in these locations.. Bones: No acute process or concerning osseous lesion..     1.  No concerning pulmonary nodule. 2.  Hepatic steatosis is  incidentally noted. 3. Emphysema. Lung RADS: 1: Negative Recommendation: Continued annual low-dose screening CT follow-up in 1 year.

## 2023-12-06 ENCOUNTER — RESEARCH ENCOUNTER (OUTPATIENT)
Dept: RESEARCH | Facility: MEDICAL CENTER | Age: 78
End: 2023-12-06
Payer: MEDICARE

## 2023-12-06 DIAGNOSIS — Z00.6 RESEARCH STUDY PATIENT: ICD-10-CM

## 2023-12-06 NOTE — RESEARCH NOTE
Spoke to pt. Sending consent forms and instructions on how to find them. Will follow up in a couple days to confirm signatures and schedule appt.

## 2023-12-19 DIAGNOSIS — Z00.6 RESEARCH STUDY PATIENT: ICD-10-CM

## 2023-12-19 NOTE — RESEARCH NOTE
Confirmed with the participant which designated provider they would like study results shared with. Patient will have an opportunity to share the results with any providers of their choosing in the future by accessing their results from [employer].

## 2023-12-21 ENCOUNTER — HOSPITAL ENCOUNTER (OUTPATIENT)
Dept: LAB | Facility: MEDICAL CENTER | Age: 78
End: 2023-12-21
Attending: INTERNAL MEDICINE
Payer: MEDICARE

## 2023-12-21 DIAGNOSIS — Z00.6 RESEARCH STUDY PATIENT: ICD-10-CM

## 2023-12-29 LAB
ELF SCORE: 10.76 PPM (ref 9.8–11.3)
RELATIVE RISK: ABNORMAL
RISK GROUP: ABNORMAL
RISK: 23.6 %

## 2024-01-25 LAB
APOB+LDLR+PCSK9 GENE MUT ANL BLD/T: NOT DETECTED
BRCA1+BRCA2 DEL+DUP + FULL MUT ANL BLD/T: NOT DETECTED
MLH1+MSH2+MSH6+PMS2 GN DEL+DUP+FUL M: NOT DETECTED

## 2024-02-08 ENCOUNTER — HOSPITAL ENCOUNTER (OUTPATIENT)
Dept: LAB | Facility: MEDICAL CENTER | Age: 79
End: 2024-02-08
Attending: INTERNAL MEDICINE
Payer: MEDICARE

## 2024-02-08 DIAGNOSIS — R73.03 PRE-DIABETES: ICD-10-CM

## 2024-02-08 DIAGNOSIS — E78.5 DYSLIPIDEMIA: ICD-10-CM

## 2024-02-08 DIAGNOSIS — E03.9 HYPOTHYROIDISM, UNSPECIFIED TYPE: ICD-10-CM

## 2024-02-08 LAB
25(OH)D3 SERPL-MCNC: 42 NG/ML (ref 30–100)
ALBUMIN SERPL BCP-MCNC: 4 G/DL (ref 3.2–4.9)
ALBUMIN/GLOB SERPL: 1.3 G/DL
ALP SERPL-CCNC: 59 U/L (ref 30–99)
ALT SERPL-CCNC: 29 U/L (ref 2–50)
ANION GAP SERPL CALC-SCNC: 13 MMOL/L (ref 7–16)
AST SERPL-CCNC: 23 U/L (ref 12–45)
BASOPHILS # BLD AUTO: 1.5 % (ref 0–1.8)
BASOPHILS # BLD: 0.1 K/UL (ref 0–0.12)
BILIRUB SERPL-MCNC: 0.3 MG/DL (ref 0.1–1.5)
BUN SERPL-MCNC: 24 MG/DL (ref 8–22)
CALCIUM ALBUM COR SERPL-MCNC: 9.7 MG/DL (ref 8.5–10.5)
CALCIUM SERPL-MCNC: 9.7 MG/DL (ref 8.5–10.5)
CHLORIDE SERPL-SCNC: 102 MMOL/L (ref 96–112)
CHOLEST SERPL-MCNC: 171 MG/DL (ref 100–199)
CO2 SERPL-SCNC: 24 MMOL/L (ref 20–33)
CREAT SERPL-MCNC: 1 MG/DL (ref 0.5–1.4)
CREAT UR-MCNC: 116.83 MG/DL
EOSINOPHIL # BLD AUTO: 0.22 K/UL (ref 0–0.51)
EOSINOPHIL NFR BLD: 3.3 % (ref 0–6.9)
ERYTHROCYTE [DISTWIDTH] IN BLOOD BY AUTOMATED COUNT: 46.7 FL (ref 35.9–50)
EST. AVERAGE GLUCOSE BLD GHB EST-MCNC: 154 MG/DL
FASTING STATUS PATIENT QL REPORTED: NORMAL
GFR SERPLBLD CREATININE-BSD FMLA CKD-EPI: 58 ML/MIN/1.73 M 2
GLOBULIN SER CALC-MCNC: 3.2 G/DL (ref 1.9–3.5)
GLUCOSE SERPL-MCNC: 99 MG/DL (ref 65–99)
HBA1C MFR BLD: 7 % (ref 4–5.6)
HCT VFR BLD AUTO: 38.4 % (ref 37–47)
HDLC SERPL-MCNC: 34 MG/DL
HGB BLD-MCNC: 12.6 G/DL (ref 12–16)
IMM GRANULOCYTES # BLD AUTO: 0.09 K/UL (ref 0–0.11)
IMM GRANULOCYTES NFR BLD AUTO: 1.4 % (ref 0–0.9)
LDLC SERPL CALC-MCNC: 103 MG/DL
LYMPHOCYTES # BLD AUTO: 1.73 K/UL (ref 1–4.8)
LYMPHOCYTES NFR BLD: 26.3 % (ref 22–41)
MCH RBC QN AUTO: 30.1 PG (ref 27–33)
MCHC RBC AUTO-ENTMCNC: 32.8 G/DL (ref 32.2–35.5)
MCV RBC AUTO: 91.9 FL (ref 81.4–97.8)
MICROALBUMIN UR-MCNC: <1.2 MG/DL
MICROALBUMIN/CREAT UR: NORMAL MG/G (ref 0–30)
MONOCYTES # BLD AUTO: 0.55 K/UL (ref 0–0.85)
MONOCYTES NFR BLD AUTO: 8.3 % (ref 0–13.4)
NEUTROPHILS # BLD AUTO: 3.9 K/UL (ref 1.82–7.42)
NEUTROPHILS NFR BLD: 59.2 % (ref 44–72)
NRBC # BLD AUTO: 0 K/UL
NRBC BLD-RTO: 0 /100 WBC (ref 0–0.2)
PLATELET # BLD AUTO: 290 K/UL (ref 164–446)
PMV BLD AUTO: 9.4 FL (ref 9–12.9)
POTASSIUM SERPL-SCNC: 4.3 MMOL/L (ref 3.6–5.5)
PROT SERPL-MCNC: 7.2 G/DL (ref 6–8.2)
RBC # BLD AUTO: 4.18 M/UL (ref 4.2–5.4)
SODIUM SERPL-SCNC: 139 MMOL/L (ref 135–145)
T4 FREE SERPL-MCNC: 1.4 NG/DL (ref 0.93–1.7)
TRIGL SERPL-MCNC: 171 MG/DL (ref 0–149)
TSH SERPL DL<=0.005 MIU/L-ACNC: 3.18 UIU/ML (ref 0.38–5.33)
VIT B12 SERPL-MCNC: 1964 PG/ML (ref 211–911)
WBC # BLD AUTO: 6.6 K/UL (ref 4.8–10.8)

## 2024-02-08 PROCEDURE — 84439 ASSAY OF FREE THYROXINE: CPT

## 2024-02-08 PROCEDURE — 82570 ASSAY OF URINE CREATININE: CPT

## 2024-02-08 PROCEDURE — 80061 LIPID PANEL: CPT

## 2024-02-08 PROCEDURE — 83036 HEMOGLOBIN GLYCOSYLATED A1C: CPT

## 2024-02-08 PROCEDURE — 82607 VITAMIN B-12: CPT

## 2024-02-08 PROCEDURE — 36415 COLL VENOUS BLD VENIPUNCTURE: CPT

## 2024-02-08 PROCEDURE — 82306 VITAMIN D 25 HYDROXY: CPT

## 2024-02-08 PROCEDURE — 82043 UR ALBUMIN QUANTITATIVE: CPT

## 2024-02-08 PROCEDURE — 84443 ASSAY THYROID STIM HORMONE: CPT

## 2024-02-08 PROCEDURE — 85025 COMPLETE CBC W/AUTO DIFF WBC: CPT

## 2024-02-08 PROCEDURE — 80053 COMPREHEN METABOLIC PANEL: CPT

## 2024-02-12 ENCOUNTER — OFFICE VISIT (OUTPATIENT)
Dept: MEDICAL GROUP | Facility: PHYSICIAN GROUP | Age: 79
End: 2024-02-12
Payer: MEDICARE

## 2024-02-12 VITALS
WEIGHT: 221 LBS | TEMPERATURE: 97 F | HEART RATE: 71 BPM | SYSTOLIC BLOOD PRESSURE: 112 MMHG | BODY MASS INDEX: 39.16 KG/M2 | RESPIRATION RATE: 16 BRPM | HEIGHT: 63 IN | OXYGEN SATURATION: 95 % | DIASTOLIC BLOOD PRESSURE: 62 MMHG

## 2024-02-12 DIAGNOSIS — L30.9 DERMATITIS: ICD-10-CM

## 2024-02-12 DIAGNOSIS — N18.31 STAGE 3A CHRONIC KIDNEY DISEASE: ICD-10-CM

## 2024-02-12 DIAGNOSIS — E66.01 SEVERE OBESITY (BMI 35.0-39.9) WITH COMORBIDITY (HCC): ICD-10-CM

## 2024-02-12 DIAGNOSIS — F33.0 MILD EPISODE OF RECURRENT MAJOR DEPRESSIVE DISORDER (HCC): ICD-10-CM

## 2024-02-12 DIAGNOSIS — J43.1 PANLOBULAR EMPHYSEMA (HCC): ICD-10-CM

## 2024-02-12 DIAGNOSIS — E11.9 TYPE 2 DIABETES MELLITUS WITHOUT COMPLICATION, WITHOUT LONG-TERM CURRENT USE OF INSULIN (HCC): ICD-10-CM

## 2024-02-12 PROBLEM — J96.10 CHRONIC RESPIRATORY FAILURE (HCC): Status: RESOLVED | Noted: 2022-11-10 | Resolved: 2024-02-12

## 2024-02-12 PROCEDURE — 99215 OFFICE O/P EST HI 40 MIN: CPT | Performed by: INTERNAL MEDICINE

## 2024-02-12 PROCEDURE — 3078F DIAST BP <80 MM HG: CPT | Performed by: INTERNAL MEDICINE

## 2024-02-12 PROCEDURE — 3074F SYST BP LT 130 MM HG: CPT | Performed by: INTERNAL MEDICINE

## 2024-02-12 RX ORDER — TRIAMCINOLONE ACETONIDE 1 MG/G
1 CREAM TOPICAL 2 TIMES DAILY
Qty: 45 G | Refills: 3 | Status: SHIPPED | OUTPATIENT
Start: 2024-02-12 | End: 2024-02-26

## 2024-02-12 RX ORDER — MAGNESIUM GLUCONATE 27 MG(500)
1000 TABLET ORAL DAILY
COMMUNITY

## 2024-02-12 ASSESSMENT — PATIENT HEALTH QUESTIONNAIRE - PHQ9
SUM OF ALL RESPONSES TO PHQ QUESTIONS 1-9: 6
5. POOR APPETITE OR OVEREATING: 0 - NOT AT ALL
CLINICAL INTERPRETATION OF PHQ2 SCORE: 4

## 2024-02-12 ASSESSMENT — FIBROSIS 4 INDEX: FIB4 SCORE: 1.15

## 2024-02-12 NOTE — ASSESSMENT & PLAN NOTE
Relatively new issue, decompensated, advised that she needs to try to limit NSAIDs is much as possible continue good oral hydration.  Need to get blood sugar down to get the diabetes under better control, no microalbuminuria noted at this time.  Will plan to continue checking every 3 to 6 months to ensure relative stability.

## 2024-02-12 NOTE — ASSESSMENT & PLAN NOTE
Chronic ongoing problem, continues to struggle with her mood with so many adjustments including the passing of her spouse just over a year ago as well as moving into her daughter's residence.  Provided active listening and support, prefers natural remedies so we will avoid any pharmacologic's at this time.

## 2024-02-12 NOTE — ASSESSMENT & PLAN NOTE
Chronic improved problem, no longer smoking which has helped, established with pulmonary medicine follows up every 6 months, continue Breo Ellipta 200-25 mcg daily and rescue inhaler nebulizers as needed.

## 2024-02-12 NOTE — ASSESSMENT & PLAN NOTE
Chronic decompensated problem, sure more longstanding issues with skin irritation, recently worsened more than usual especially under the bilateral breast.  Not consistent with fungal infection at this time.  Looks like us irritant dermatitis may be from friction of the SKs and skin tags, she will try topical acetic acid which has worked in the past and I will give her prescription for triamcinolone to apply twice daily as needed for up to 14 days to help with the associated pruritus.  Has seen dermatology in the past and would like to avoid that moving forward.

## 2024-02-12 NOTE — ASSESSMENT & PLAN NOTE
Decompensated issue, discussed that she certainly qualifies for medicines and there is some that can help with her constipation such as metformin and others that will help with weight loss such as Ozempic however she is very hesitant to pursue those options at this time and would like to try to start with lifestyle approach which I think is reasonable.  She has not seen dietitian for a number of years so we will get her back in to reeducate on approaches to diabetic diet and help her with meal planning.  She has a glucometer from her mom but advised her I can get her new orders at any point that she would be interested.  Will arrange for short-term follow-up in 3 months to recheck A1c.

## 2024-02-12 NOTE — ASSESSMENT & PLAN NOTE
Chronic decompensated issue, justin trended up to 39 with resultant worsening of diabetes.  Encouraged her to find ways to be more active and that we need to try to make some serious changes to her diet.  She is extremely hesitant to add any prescription medicine I told her that would be fine as long as we can start treating this more aggressively before it starts causing issues.  Will place a referral for her to meet with a nutritionist so she can start working on education of how to improve the diabetes, discussed that carbohydrates and sugars as well as processed foods need to be down to a minimum.  Patient voiced understanding and will arrange for short-term follow-up in 3 months with A1c testing.
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
## 2024-02-13 ENCOUNTER — OFFICE VISIT (OUTPATIENT)
Dept: MEDICAL GROUP | Facility: PHYSICIAN GROUP | Age: 79
End: 2024-02-13
Attending: FAMILY MEDICINE
Payer: MEDICARE

## 2024-02-13 VITALS
BODY MASS INDEX: 37.56 KG/M2 | HEIGHT: 64 IN | WEIGHT: 220 LBS | DIASTOLIC BLOOD PRESSURE: 58 MMHG | SYSTOLIC BLOOD PRESSURE: 112 MMHG

## 2024-02-13 DIAGNOSIS — J44.9 CHRONIC OBSTRUCTIVE PULMONARY DISEASE, UNSPECIFIED COPD TYPE (HCC): ICD-10-CM

## 2024-02-13 DIAGNOSIS — E78.5 DYSLIPIDEMIA: ICD-10-CM

## 2024-02-13 DIAGNOSIS — F32.0 CURRENT MILD EPISODE OF MAJOR DEPRESSIVE DISORDER WITHOUT PRIOR EPISODE (HCC): ICD-10-CM

## 2024-02-13 DIAGNOSIS — E66.01 SEVERE OBESITY (BMI 35.0-39.9) WITH COMORBIDITY (HCC): ICD-10-CM

## 2024-02-13 DIAGNOSIS — J45.50 SEVERE PERSISTENT ASTHMA WITHOUT COMPLICATION: ICD-10-CM

## 2024-02-13 DIAGNOSIS — N18.31 STAGE 3A CHRONIC KIDNEY DISEASE: ICD-10-CM

## 2024-02-13 DIAGNOSIS — M81.0 AGE-RELATED OSTEOPOROSIS WITHOUT CURRENT PATHOLOGICAL FRACTURE: ICD-10-CM

## 2024-02-13 PROCEDURE — 3074F SYST BP LT 130 MM HG: CPT

## 2024-02-13 PROCEDURE — 1126F AMNT PAIN NOTED NONE PRSNT: CPT

## 2024-02-13 PROCEDURE — G0439 PPPS, SUBSEQ VISIT: HCPCS

## 2024-02-13 PROCEDURE — 3078F DIAST BP <80 MM HG: CPT

## 2024-02-13 SDOH — ECONOMIC STABILITY: FOOD INSECURITY: WITHIN THE PAST 12 MONTHS, THE FOOD YOU BOUGHT JUST DIDN'T LAST AND YOU DIDN'T HAVE MONEY TO GET MORE.: NEVER TRUE

## 2024-02-13 SDOH — ECONOMIC STABILITY: FOOD INSECURITY: WITHIN THE PAST 12 MONTHS, YOU WORRIED THAT YOUR FOOD WOULD RUN OUT BEFORE YOU GOT MONEY TO BUY MORE.: NEVER TRUE

## 2024-02-13 SDOH — ECONOMIC STABILITY: INCOME INSECURITY: HOW HARD IS IT FOR YOU TO PAY FOR THE VERY BASICS LIKE FOOD, HOUSING, MEDICAL CARE, AND HEATING?: NOT VERY HARD

## 2024-02-13 SDOH — ECONOMIC STABILITY: INCOME INSECURITY: IN THE LAST 12 MONTHS, WAS THERE A TIME WHEN YOU WERE NOT ABLE TO PAY THE MORTGAGE OR RENT ON TIME?: NO

## 2024-02-13 SDOH — ECONOMIC STABILITY: HOUSING INSECURITY: IN THE LAST 12 MONTHS, HOW MANY PLACES HAVE YOU LIVED?: 1

## 2024-02-13 ASSESSMENT — ENCOUNTER SYMPTOMS: GENERAL WELL-BEING: GOOD

## 2024-02-13 ASSESSMENT — PATIENT HEALTH QUESTIONNAIRE - PHQ9
5. POOR APPETITE OR OVEREATING: 0 - NOT AT ALL
CLINICAL INTERPRETATION OF PHQ2 SCORE: 2
SUM OF ALL RESPONSES TO PHQ QUESTIONS 1-9: 8

## 2024-02-13 ASSESSMENT — PAIN SCALES - GENERAL: PAINLEVEL: NO PAIN

## 2024-02-13 ASSESSMENT — FIBROSIS 4 INDEX: FIB4 SCORE: 1.15

## 2024-02-13 ASSESSMENT — ACTIVITIES OF DAILY LIVING (ADL): BATHING_REQUIRES_ASSISTANCE: 0

## 2024-02-13 NOTE — ASSESSMENT & PLAN NOTE
Chronic, stable. Last GFR in Feb 2024 was 58. She states she has been taking ibuprofen recently. We discussed avoiding nephrotoxic medication such as NSAIDs as well as maintaining adequate hydration. Follow up with PCP for continued monitoring.

## 2024-02-13 NOTE — ASSESSMENT & PLAN NOTE
Chronic, stable. Last lipid panel in Feb 2024 results below. She does not currently take any cholesterol lowering medication. She prefers to manage with lifestlye modifications. We discussed her current dietary/lifestyle regimen. Follow up with PCP for continued monitoring and management.  Lab Results   Component Value Date/Time    CHOLSTRLTOT 171 02/08/2024 07:39 AM    TRIGLYCERIDE 171 (H) 02/08/2024 07:39 AM    HDL 34 (A) 02/08/2024 07:39 AM     (H) 02/08/2024 07:39 AM

## 2024-02-13 NOTE — ASSESSMENT & PLAN NOTE
Chronic, stable. Weight in office today is 220 lbs. BMI 37.76 kg/m2. We discussed her current diet/exercise regimen. She is going to meet with a nutritionist. Encouraged to remain physically active as well as monitor intake of excess calories. Comorbidities include dyslipidemia and T2DM. Follow up with PCP for continued monitoring.

## 2024-02-13 NOTE — ASSESSMENT & PLAN NOTE
Chronic, stable. Last DEXA 2022 revealed lumbar spine T score of -2.5 & proximal left femur T score of -0.5. She does take calcium and vitamin D supplementation. She was prescribed alendronate 70 mg weekly in Nov 2023, but states she could not get into that routine of taking it. Not currently engaging in weightbearing exercise. No hx of fragility fractures. Continue to follow up with PCP as previously scheduled.

## 2024-02-13 NOTE — PROGRESS NOTES
Comprehensive Health Assessment Program     Patrica Qiu is a 78 y.o. here for her comprehensive health assessment.    Patient Active Problem List    Diagnosis Date Noted    Current mild episode of major depressive disorder without prior episode ([facility]) 02/13/2024    Type 2 diabetes mellitus without complication, without long-term current use of insulin ([facility]) 02/12/2024    Dermatitis 02/12/2024    Stage 3a chronic kidney disease ([facility]) 02/12/2024    Iron deficiency 05/17/2023    Hypothyroidism 05/17/2023    Severe obesity (BMI 35.0-39.9) with comorbidity ([facility]) 11/10/2022    Dyslipidemia 11/10/2022    Chronic low back pain 11/10/2022    Osteoporosis without current pathological fracture 11/10/2022    Neuropathy 11/10/2022    Pre-diabetes 10/12/2022    Other constipation 09/21/2022    Chronic obstructive pulmonary disease ([facility]) 08/22/2022    Severe persistent asthma without complication 08/17/2022    Hx of smoking 07/13/2022       Current Outpatient Medications   Medication Sig Dispense Refill    magnesium gluconate (MAG-G) 500 MG tablet Take 1,000 mg by mouth every day.      triamcinolone acetonide (KENALOG) 0.1 % Cream Apply 1 Application topically 2 times a day for 14 days. 45 g 3    albuterol 108 (90 Base) MCG/ACT Aero Soln inhalation aerosol Inhale 2 Puffs every 6 hours as needed for Shortness of Breath. 3 Each 3    cyclobenzaprine (FLEXERIL) 5 mg tablet Take 1-2 Tablets by mouth every evening. 200 Tablet 3    levothyroxine (SYNTHROID) 88 MCG Tab Take 1 Tablet by mouth every morning on an empty stomach. 100 Tablet 3    Sodium Hyaluronate, oral, (HYALURONIC ACID PO) Take 1 Tablet by mouth every day.      NON SPECIFIED Take 1 Tablet by mouth 2 times a day. S-equol + Calcium, menopause seupplemnt Equelle      fluticasone furoate-vilanterol (BREO ELLIPTA) 200-25 MCG/ACT AEROSOL POWDER, BREATH ACTIVATED Inhale 1 Puff every day. 3 Each 3    DIGESTIVE AIDS MIXTURE PO Take  by mouth.      Ascorbic Acid  (VITAMIN C) 1000 MG Tab Take 1 Tablet by mouth.      multivitamin (THERAGRAN) Tab Take 1 Tablet by mouth every day.      cyanocobalamin (VITAMIN B12) 1000 MCG Tab Take 1,000 mcg by mouth every day. Only taking 500 MCG currently      vitamin D3 (CHOLECALCIFEROL) 1000 Unit (25 mcg) Tab Take 1 Tablet by mouth every day.      vitamin e (VITAMIN E) 400 UNIT Cap Take 1 Capsule by mouth every day.      calcium citrate (CALCITRATE) 950 (200 Ca) MG Tab Take 1 Tablet by mouth every day.      loratadine (CLARITIN) 10 MG Tab Take 1 Tablet by mouth every day.       No current facility-administered medications for this visit.          Current supplements as per medication list.     Allergies:   Patient has no known allergies.  Social History     Tobacco Use    Smoking status: Former     Current packs/day: 0.00     Average packs/day: 0.5 packs/day for 42.0 years (21.0 ttl pk-yrs)     Types: Cigarettes     Start date: 1980     Quit date: 2022     Years since quittin.6    Smokeless tobacco: Never   Vaping Use    Vaping Use: Former    Passive vaping exposure: Yes   Substance Use Topics    Alcohol use: Not Currently     Comment: not for 5 years    Drug use: Not Currently     Types: Oral, Marijuana     Comment: CBD GUMMIES OCC.     Family History   Problem Relation Age of Onset    Heart Disease Mother     Heart Disease Father     Cancer Father     Asthma Father     Dementia Father     Heart Attack Brother     Heart Attack Brother     Parkinson's Disease Brother     Asthma Brother      Patrica  has a past medical history of Acute respiratory failure with hypoxia, asthma exacerbation (HCC) (2022), Anxiety (2022), Asthma, Asthma exacerbation attacks (2022), BMI 32.0-32.9,adult (11/10/2022), Chest tightness, Chronic obstructive pulmonary disease (HCC), Chronic respiratory failure (HCC) (11/10/2022), Shortness of breath, and Wheezing.    She has no past medical history of Cough, Difficulty breathing,  Fainting, Painful breathing, Palpitations, Sputum production, or Swelling of lower extremity.   Past Surgical History:   Procedure Laterality Date    DILATION AND CURETTAGE  1990    TUBAL LIGATION  1969       Screening:  In the last six months have you experienced any leakage of urine? Yes, states she notes it happens when she waits too long to go the bathroom. Does not wear a pad/depends.    Depression Screening  Little interest or pleasure in doing things?  1 - several days  Feeling down, depressed , or hopeless? 1 - several days  Trouble falling or staying asleep, or sleeping too much?  1 - several days  Feeling tired or having little energy?  3 - nearly every day  Poor appetite or overeating?  0 - not at all  Feeling bad about yourself - or that you are a failure or have let yourself or your family down? 1 - several days  Trouble concentrating on things, such as reading the newspaper or watching television? 1 - several days  Moving or speaking so slowly that other people could have noticed.  Or the opposite - being so fidgety or restless that you have been moving around a lot more than usual?  0 - not at all  Thoughts that you would be better off dead, or of hurting yourself?  0 - not at all  Patient Health Questionnaire Score: 8    If depressive symptoms identified deferred to follow up visit unless specifically addressed in assessment and plan.    Interpretation of PHQ-9 Total Score   Score Severity   1-4 No Depression   5-9 Mild Depression   10-14 Moderate Depression   15-19 Moderately Severe Depression   20-27 Severe Depression    Screening for Cognitive Impairment  Do you or any of your friends or family members have any concern about your memory? Yes, she does not have any concerns. Her daughter has told her that her memory is bad.  Three Minute Recall (Banana, Sunrise, Chair) 3/3    Connor clock face with all 12 numbers and set the hands to show 20 past 8.  Yes    Cognitive concerns identified deferred for  follow up unless specifically addressed in assessment and plan.    Fall Risk Assessment  Has the patient had two or more falls in the last year or any fall with injury in the last year?  No    Safety Assessment  Do you always wear your seatbelt?  Yes  Any changes to home needed to function safely? No  Difficulty hearing.  No  Patient counseled about all safety risks that were identified.    Functional Assessment ADLs  Are there any barriers preventing you from cooking for yourself or meeting nutritional needs?  Yes. Small kitchen   Are there any barriers preventing you from driving safely or obtaining transportation?  No.    Are there any barriers preventing you from using a telephone or calling for help?  No    Are there any barriers preventing you from shopping?  No.    Are there any barriers preventing you from taking care of your own finances?  No    Are there any barriers preventing you from managing your medications?  No    Are there any barriers preventing you from showering, bathing or dressing yourself? No    Are there any barriers preventing you from doing housework or laundry? No    Are there any barriers preventing you from using the toilet?No    Are you currently engaging in any exercise or physical activity?  Yes. Chair exercises and aquatic PT  Was costing her 200$ month so she cannot afford it.    Self-Assessment of Health  What is your perception of your health? Good    Do you sleep more than six hours a night? No Trouble staying, woke up at 1:30AM and did not go back to sleep   In the past 7 days, how much did pain keep you from doing your normal work? None    Do you spend quality time with family or friends (virtually or in person)? Yes    Do you usually eat a heart healthy diet that constists of a variety of fruits, vegetables, whole grains and fiber? Yes Intolerance to Wheat- Gluten Intolerant   Do you eat foods high in fat and/or Fast Food more than three times per week? No    How concerned are  you that your medical conditions are not being well managed? Not at all    Are you worried that in the next 2 months, you may not have stable housing that you own, rent, or stay in as part of a household? No        Advance Care Planning  Do you have an Advance Directive, Living Will, Durable Power of , or POLST? Yes    Living Will Durable Power of    is not on file - instructed patient to bring in a copy to scan into their chart      Health Maintenance Summary            Overdue - Diabetes: Monofilament / LE Exam (Yearly) Overdue - never done      No completion history exists for this topic.              Overdue - Diabetes: Retinopathy Screening (Yearly) Overdue - never done      No completion history exists for this topic.              Overdue - Zoster (Shingles) Vaccines (1 of 2) Overdue - never done      No completion history exists for this topic.              Overdue - Hepatitis B Vaccine (Hep B) (1 of 3 - Risk 3-dose series) Overdue - never done      No completion history exists for this topic.              Postponed - COVID-19 Vaccine (2 - 2023-24 season) Postponed until 2/12/2025 03/04/2021  Imm Admin: [place] SARS-CoV-2 Vaccine              Ordered - A1c Screening (Every 6 Months) Ordered on 2/12/2024 02/08/2024  HEMOGLOBIN A1C    08/17/2023  HEMOGLOBIN A1C    04/06/2023  HEMOGLOBIN A1C    09/19/2022  HEMOGLOBIN A1C    11/03/2016  HEMOGLOBIN A1C              Annual Pulmonary Function Test / Spirometry (Yearly) Next due on 9/13/2024 09/13/2023  PFT DICTATED RESULTS    11/17/2021  PULMONARY FUNCTION TESTS -Test requested: Complete Pulmonary Function Test    03/13/2020  PULMONARY FUNCTION TESTS -Test requested: Complete Pulmonary Function Test              Bone Density Scan (Every 2 Years) Next due on 10/26/2024      10/26/2022  DS-BONE DENSITY STUDY (DEXA)    04/09/2007  DS-BONE DENSITY STUDY (DEXA)              Mammogram (Yearly) Next due on 11/17/2024 11/17/2023   MA-SCREENING MAMMO BILAT W/TOMOSYNTHESIS W/CAD    11/16/2022  MA-SCREENING MAMMO BILAT W/TOMOSYNTHESIS W/CAD    04/09/2007  MA-CAD SCREENING-MAMMO    04/09/2007  MA-SCREENING DIGITAL MAMMO              Fasting Lipid Profile (Yearly) Tentatively due on 2/8/2025 02/08/2024  Lipid Profile    08/17/2023  Lipid Profile    04/06/2023  Lipid Profile    07/13/2022  Lipid Profile (Lipid Panel) Fasting    11/03/2016  LIPID PROFILE              Diabetes: Urine Protein Screening (Yearly) Next due on 2/8/2025 02/08/2024  MICROALBUMIN CREAT RATIO URINE              SERUM CREATININE (Yearly) Next due on 2/8/2025 02/08/2024  Comp Metabolic Panel    08/17/2023  Comp Metabolic Panel    04/06/2023  Comp Metabolic Panel    09/19/2022  Comp Metabolic Panel    07/12/2022  Comp Metabolic Panel    Only the first 5 history entries have been loaded, but more history exists.              Annual Wellness Visit (Yearly) Next due on 2/13/2025 02/13/2024  Level of Service: SD ANNUAL WELLNESS VISIT-INCLUDES PPPS SUBSEQUE*    07/13/2023  Level of Service: SD ANNUAL WELLNESS VISIT-INCLUDES PPPS SUBSEQUE*              IMM DTaP/Tdap/Td Vaccine (2 - Td or Tdap) Next due on 8/3/2032      08/03/2022  Imm Admin: Tdap Vaccine              Pneumococcal Vaccine: 65+ Years (Series Information) Completed      08/03/2022  Imm Admin: Pneumococcal Conjugate Vaccine (PCV20)    12/16/2019  Imm Admin: Pneumococcal Conjugate Vaccine (Prevnar/PCV-13)              Hepatitis C Screening  Completed      08/17/2023  Hepatitis C Antibody component of HEP C VIRUS ANTIBODY              Influenza Vaccine (Series Information) Completed      11/09/2023  Imm Admin: Influenza Vaccine Adult HD    09/21/2022  Imm Admin: Influenza Vaccine Adult HD    12/16/2019  Imm Admin: Influenza Vaccine Adult HD              Lung Cancer Screening Shared Decision Making  Completed      11/27/2023  Registry Metric: Lung Cancer Shared Decision Making Conversation Date          "     Hepatitis A Vaccine (Hep A) (Series Information) Aged Out      No completion history exists for this topic.              HPV Vaccines (Series Information) Aged Out      No completion history exists for this topic.              Polio Vaccine (Inactivated Polio) (Series Information) Aged Out      No completion history exists for this topic.              Meningococcal Immunization (Series Information) Aged Out      No completion history exists for this topic.              Discontinued - Colorectal Cancer Screening  Discontinued        Frequency changed to Never automatically (Topic No Longer Applies)    09/17/2018  COLONOSCOPY RESULTS              Discontinued - Lung Cancer Screening  Discontinued        Frequency changed to Never automatically (Topic No Longer Applies)    11/30/2023  CT-LUNG CANCER-SCREENING    07/15/2022  CT-CHEST (THORAX) W/O                    Patient Care Team:  Jackelyn Trujillo D.O. as PCP - General (Internal Medicine)  Jackelyn Trujillo D.O. as PCP - [facility] Paneled (Internal Medicine)  preferred home care as Respiratory Therapist    Financial Resource Strain: Low Risk  (2/13/2024)    Overall Financial Resource Strain (CARDIA)     Difficulty of Paying Living Expenses: Not very hard      Transportation Needs: No Transportation Needs (2/13/2024)    PRAPARE - Transportation     Lack of Transportation (Medical): No     Lack of Transportation (Non-Medical): No      Food Insecurity: No Food Insecurity (2/13/2024)    Hunger Vital Sign     Worried About Running Out of Food in the Last Year: Never true     Ran Out of Food in the Last Year: Never true        Encounter Vitals  Blood Pressure : 112/58  O2 Delivery Device: Simple Mask (Concentrator)  Weight: 99.8 kg (220 lb)  Height: 162.6 cm (5' 4\")  BMI (Calculated): 37.76  Pain Score: No pain  Pulmonary Vitals  O2 Flow Rate (L/min): 2.5  DME  O2 Delivery Device: Simple Mask (Concentrator)     Alert, oriented in no acute distress.  Eye contact is " good, speech goal directed, affect calm.    Assessment and Plan. The following treatment and monitoring plan is recommended:  Chronic obstructive pulmonary disease (HCC)  Chronic, stable. Prior history of smoking, stopped after her most recent hospitalization. Currently maintains on Breo Ellipta daily and albuterol inhaler PRN. States her breathing is well controlled. She does follow with pulmonology.        Dyslipidemia  Chronic, stable. Last lipid panel in Feb 2024 results below. She does not currently take any cholesterol lowering medication. She prefers to manage with lifestlye modifications. We discussed her current dietary/lifestyle regimen. Follow up with PCP for continued monitoring and management.  Lab Results   Component Value Date/Time    CHOLSTRLTOT 171 02/08/2024 07:39 AM    TRIGLYCERIDE 171 (H) 02/08/2024 07:39 AM    HDL 34 (A) 02/08/2024 07:39 AM     (H) 02/08/2024 07:39 AM         Osteoporosis without current pathological fracture  Chronic, stable. Last DEXA 2022 revealed lumbar spine T score of -2.5 & proximal left femur T score of -0.5. She does take calcium and vitamin D supplementation. She was prescribed alendronate 70 mg weekly in Nov 2023, but states she could not get into that routine of taking it. Not currently engaging in weightbearing exercise. No hx of fragility fractures. Continue to follow up with PCP as previously scheduled.      Severe obesity (BMI 35.0-39.9) with comorbidity (HCC)  Chronic, stable. Weight in office today is 220 lbs. BMI 37.76 kg/m2. We discussed her current diet/exercise regimen. She is going to meet with a nutritionist. Encouraged to remain physically active as well as monitor intake of excess calories. Comorbidities include dyslipidemia and T2DM. Follow up with PCP for continued monitoring.      Stage 3a chronic kidney disease (HCC)  Chronic, stable. Last GFR in Feb 2024 was 58. She states she has been taking ibuprofen recently. We discussed avoiding  nephrotoxic medication such as NSAIDs as well as maintaining adequate hydration. Follow up with PCP for continued monitoring.    Severe persistent asthma without complication  Chronic, stable. She has Breo Ellipta inhaler for daily use as well as albuterol inhaler PRN. States she feels this is well controlled. She follows with pulmonology regularly. Follow up as previously scheduled.    Current mild episode of major depressive disorder without prior episode (HCC)  Chronic, stable. PHQ-9 in office today is 8. She states that she feels depressed off & on since her  passed away last year. She moved in with her daughter which has presented challenges for them both. She is not on any pharmacotherapy. She says she can usually pinpoint what is causing her to feel down. Follow up with PCP as needed.    Services suggested: No services needed at this time  Health Care Screening: Age-appropriate preventive services recommended by USPTF and ACIP covered by Medicare were discussed today. Services ordered if indicated and agreed upon by the patient.  Referrals offered: Community-based lifestyle interventions to reduce health risks and promote self-management and wellness, fall prevention, nutrition, physical activity, tobacco-use cessation, weight loss, and mental health services as per orders if indicated.    Discussion today about general wellness and lifestyle habits:    Prevent falls and reduce trip hazards; Cautioned about securing or removing rugs.  Have a working fire alarm and carbon monoxide detector.  Engage in regular physical activity and social activities.    Follow-up: Return for appointment with Primary Care Provider as needed..

## 2024-02-13 NOTE — ASSESSMENT & PLAN NOTE
Chronic, stable. She has Breo Ellipta inhaler for daily use as well as albuterol inhaler PRN. States she feels this is well controlled. She follows with pulmonology regularly. Follow up as previously scheduled.

## 2024-02-13 NOTE — ASSESSMENT & PLAN NOTE
Chronic, stable. PHQ-9 in office today is 8. She states that she feels depressed off & on since her  passed away last year. She moved in with her daughter which has presented challenges for them both. She is not on any pharmacotherapy. She says she can usually pinpoint what is causing her to feel down. Follow up with PCP as needed.

## 2024-02-13 NOTE — ASSESSMENT & PLAN NOTE
Chronic, stable. Prior history of smoking, stopped after her most recent hospitalization. Currently maintains on Breo Ellipta daily and albuterol inhaler PRN. States her breathing is well controlled. She does follow with pulmonology.

## 2024-02-20 RX ORDER — FLUTICASONE FUROATE AND VILANTEROL 200; 25 UG/1; UG/1
1 POWDER RESPIRATORY (INHALATION) DAILY
Qty: 180 EACH | Refills: 0 | Status: SHIPPED | OUTPATIENT
Start: 2024-02-20 | End: 2024-03-18 | Stop reason: SDUPTHER

## 2024-02-20 NOTE — TELEPHONE ENCOUNTER
Have we ever prescribed this med? Yes.  If yes, what date? 12/05/2022 Dr. Smith    Last OV: 11/09/2023 with Dr. Smith    Next OV: Follow up q6m. Sooner if needed.     DX:     Medications: fluticasone furoate-vilanterol (BREO ELLIPTA) 200-25 MCG/ACT AEROSOL POWDER, BREATH ACTIVATED

## 2024-03-18 ENCOUNTER — TELEPHONE (OUTPATIENT)
Dept: SLEEP MEDICINE | Facility: MEDICAL CENTER | Age: 79
End: 2024-03-18
Payer: MEDICARE

## 2024-03-18 DIAGNOSIS — J45.40 MODERATE PERSISTENT ASTHMA WITHOUT COMPLICATION: ICD-10-CM

## 2024-03-18 PROCEDURE — RXMED WILLOW AMBULATORY MEDICATION CHARGE: Performed by: NURSE PRACTITIONER

## 2024-03-18 PROCEDURE — RXMED WILLOW AMBULATORY MEDICATION CHARGE: Performed by: INTERNAL MEDICINE

## 2024-03-18 RX ORDER — FLUTICASONE FUROATE AND VILANTEROL 200; 25 UG/1; UG/1
1 POWDER RESPIRATORY (INHALATION) DAILY
Qty: 180 EACH | Refills: 3 | Status: SHIPPED | OUTPATIENT
Start: 2024-03-18

## 2024-03-18 RX ORDER — ALBUTEROL SULFATE 90 UG/1
2 AEROSOL, METERED RESPIRATORY (INHALATION) EVERY 6 HOURS PRN
Qty: 54 G | Refills: 3 | Status: SHIPPED | OUTPATIENT
Start: 2024-03-18

## 2024-03-18 NOTE — TELEPHONE ENCOUNTER
Greetings!!     I spoke with Patrica and she needs a refill for albuterol 108 (90 Base) MCG/ACT Aero Soln inhalation aerosol sent to [facility] ASAP.      Unfortunately we've been unable to transfer the prescription from the current pharmacy.     Thank you!      La Fernandes  Rx Coordinator

## 2024-03-18 NOTE — TELEPHONE ENCOUNTER
Greetings!!     I spoke with Patrica and she'd like to switch   fluticasone furoate-vilanterol (BREO) 200-25 MCG/ACT to [facility].      Please send a new prescription ASAP for the following.  Unfortunately we've been unable to transfer the prescriptions from the current pharmacy.     -fluticasone furoate-vilanterol (BREO) 200-25 MCG/ACT AEROSOL POWDER, BREATH ACTIVATED      Thank you!    La Fernandes  Rx Coordinator   [phone]

## 2024-03-18 NOTE — TELEPHONE ENCOUNTER
Was the patient seen in the last year in this department? Yes   Does patient have an active prescription for medications requested? No   Received Request Via: Pharmacy  Pt needs prescription to go to [facility] pharmacy [place].

## 2024-03-19 ENCOUNTER — PHARMACY VISIT (OUTPATIENT)
Dept: PHARMACY | Facility: MEDICAL CENTER | Age: 79
End: 2024-03-19
Payer: COMMERCIAL

## 2024-04-08 SDOH — ECONOMIC STABILITY: INCOME INSECURITY: IN THE LAST 12 MONTHS, WAS THERE A TIME WHEN YOU WERE NOT ABLE TO PAY THE MORTGAGE OR RENT ON TIME?: NO

## 2024-04-08 SDOH — ECONOMIC STABILITY: FOOD INSECURITY: WITHIN THE PAST 12 MONTHS, THE FOOD YOU BOUGHT JUST DIDN'T LAST AND YOU DIDN'T HAVE MONEY TO GET MORE.: NEVER TRUE

## 2024-04-08 SDOH — ECONOMIC STABILITY: HOUSING INSECURITY: IN THE LAST 12 MONTHS, HOW MANY PLACES HAVE YOU LIVED?: 1

## 2024-04-08 SDOH — ECONOMIC STABILITY: FOOD INSECURITY: WITHIN THE PAST 12 MONTHS, YOU WORRIED THAT YOUR FOOD WOULD RUN OUT BEFORE YOU GOT MONEY TO BUY MORE.: NEVER TRUE

## 2024-04-08 SDOH — ECONOMIC STABILITY: TRANSPORTATION INSECURITY
IN THE PAST 12 MONTHS, HAS LACK OF RELIABLE TRANSPORTATION KEPT YOU FROM MEDICAL APPOINTMENTS, MEETINGS, WORK OR FROM GETTING THINGS NEEDED FOR DAILY LIVING?: NO

## 2024-04-08 SDOH — HEALTH STABILITY: PHYSICAL HEALTH: ON AVERAGE, HOW MANY MINUTES DO YOU ENGAGE IN EXERCISE AT THIS LEVEL?: 10 MIN

## 2024-04-08 SDOH — HEALTH STABILITY: MENTAL HEALTH
STRESS IS WHEN SOMEONE FEELS TENSE, NERVOUS, ANXIOUS, OR CAN'T SLEEP AT NIGHT BECAUSE THEIR MIND IS TROUBLED. HOW STRESSED ARE YOU?: TO SOME EXTENT

## 2024-04-08 SDOH — HEALTH STABILITY: PHYSICAL HEALTH: ON AVERAGE, HOW MANY DAYS PER WEEK DO YOU ENGAGE IN MODERATE TO STRENUOUS EXERCISE (LIKE A BRISK WALK)?: 0 DAYS

## 2024-04-08 ASSESSMENT — LIFESTYLE VARIABLES
AUDIT-C TOTAL SCORE: 0
HOW OFTEN DO YOU HAVE A DRINK CONTAINING ALCOHOL: NEVER
SKIP TO QUESTIONS 9-10: 1
HOW OFTEN DO YOU HAVE SIX OR MORE DRINKS ON ONE OCCASION: NEVER
HOW MANY STANDARD DRINKS CONTAINING ALCOHOL DO YOU HAVE ON A TYPICAL DAY: PATIENT DOES NOT DRINK

## 2024-04-08 ASSESSMENT — SOCIAL DETERMINANTS OF HEALTH (SDOH)
HOW OFTEN DO YOU HAVE A DRINK CONTAINING ALCOHOL: NEVER
DO YOU BELONG TO ANY CLUBS OR ORGANIZATIONS SUCH AS CHURCH GROUPS UNIONS, FRATERNAL OR ATHLETIC GROUPS, OR SCHOOL GROUPS?: NO
HOW OFTEN DO YOU ATTEND CHURCH OR RELIGIOUS SERVICES?: NEVER
HOW OFTEN DO YOU HAVE SIX OR MORE DRINKS ON ONE OCCASION: NEVER
WITHIN THE PAST 12 MONTHS, YOU WORRIED THAT YOUR FOOD WOULD RUN OUT BEFORE YOU GOT THE MONEY TO BUY MORE: NEVER TRUE
HOW MANY DRINKS CONTAINING ALCOHOL DO YOU HAVE ON A TYPICAL DAY WHEN YOU ARE DRINKING: PATIENT DOES NOT DRINK
DO YOU BELONG TO ANY CLUBS OR ORGANIZATIONS SUCH AS CHURCH GROUPS UNIONS, FRATERNAL OR ATHLETIC GROUPS, OR SCHOOL GROUPS?: NO
HOW OFTEN DO YOU GET TOGETHER WITH FRIENDS OR RELATIVES?: PATIENT DECLINED
HOW OFTEN DO YOU ATTEND CHURCH OR RELIGIOUS SERVICES?: NEVER
HOW OFTEN DO YOU ATTENT MEETINGS OF THE CLUB OR ORGANIZATION YOU BELONG TO?: PATIENT DECLINED
HOW OFTEN DO YOU ATTENT MEETINGS OF THE CLUB OR ORGANIZATION YOU BELONG TO?: PATIENT DECLINED
HOW OFTEN DO YOU GET TOGETHER WITH FRIENDS OR RELATIVES?: PATIENT DECLINED

## 2024-04-11 ENCOUNTER — NON-PROVIDER VISIT (OUTPATIENT)
Dept: INTERNAL MEDICINE | Facility: OTHER | Age: 79
End: 2024-04-11
Payer: MEDICARE

## 2024-04-11 VITALS — WEIGHT: 227 LBS | HEIGHT: 64 IN | BODY MASS INDEX: 38.76 KG/M2

## 2024-04-11 DIAGNOSIS — Z71.3 DIETARY COUNSELING AND SURVEILLANCE: ICD-10-CM

## 2024-04-11 DIAGNOSIS — E66.9 OBESITY (BMI 30-39.9): ICD-10-CM

## 2024-04-11 DIAGNOSIS — E78.5 DYSLIPIDEMIA: ICD-10-CM

## 2024-04-11 DIAGNOSIS — E11.65 TYPE 2 DIABETES MELLITUS WITH HYPERGLYCEMIA, WITHOUT LONG-TERM CURRENT USE OF INSULIN (HCC): ICD-10-CM

## 2024-04-11 DIAGNOSIS — N18.31 STAGE 3A CHRONIC KIDNEY DISEASE: ICD-10-CM

## 2024-04-11 PROCEDURE — 97802 MEDICAL NUTRITION INDIV IN: CPT | Performed by: DIETITIAN, REGISTERED

## 2024-04-11 ASSESSMENT — FIBROSIS 4 INDEX: FIB4 SCORE: 1.15

## 2024-04-11 NOTE — PATIENT INSTRUCTIONS
Wellness Vision:  I want to feel grounded, more permanent roots, lose weight so that I can walk and have realistic independence, live part-time on my property near [facility] doing things my way as long as I can.    I will get back to a balanced diet  - bring in the healthy plate visual to portion and plan my meals.      Spend time with Kadie- share your feelings, bring together each of your realistic expectations.  Every day you eat, I move- natural Rx for Diabetes- start with 5-10 min.  Look into Aqua Joint or other water exercises.  Sample cooked pumpkin or cooked carrots without added sugars for my constipation and IBS  Meet with a therapist for grief recovery- referral given by Dr. Trujillo

## 2024-04-11 NOTE — PROGRESS NOTES
"Patrica Qiu is a 78 y.o. year old, female initial nutrition evaluation for DM. Has had prediabetes for many years prior.    ROS: IBS-C x 10 years per patient, recently more bothersome with health/life change events- [place] to [place], then [place] trail, now in daughters home. Hypothyroid with 40-50 lbs weight gain.    Supplements: MVI, Vit C, D3 ,calcium, magnesium for leg cramps, digestive pills x 50+ years    Wellness Vision:  I want to feel grounded, more permanent roots, lose weight so that I can walk and have realistic independence, live part-time on my property near [facility] doing things my way as long as I can.    My Health Profile:    PHYSICAL ASSESSMENT  Current Height:    Ht Readings from Last 1 Encounters:   02/13/24 1.626 m (5' 4\")     Current Weight:    Wt Readings from Last 1 Encounters:   02/13/24 99.8 kg (220 lb)     BMI:     FLUID INTAKE:  100 oz is goal  Typical Beverages:   Servings Alcohol/D/WK/MO: none    ACTIVITY REVIEW: none  Current Exercise: can walk but doesn't  Hobbies:     PERTINENT LABS:    Latest Reference Range & Units 04/06/23 08:42 08/17/23 07:22 02/08/24 07:39   Glycohemoglobin 4.0 - 5.6 % 6.3 (H) 6.6 (H) 7.0 (H)      Latest Reference Range & Units 02/08/24 07:39   Cholesterol,Tot 100 - 199 mg/dL 171   Triglycerides 0 - 149 mg/dL 171 (H)   HDL >=40 mg/dL 34 !   LDL <100 mg/dL 103 (H)      Latest Reference Range & Units 08/17/23 07:22 02/08/24 07:39   GFR (CKD-EPI) >60 mL/min/1.73 m 2 56 ! 58 !     Patient Behaviors (indicate frequency)  Meal/Snack Pattern:     Bed at 2000-reads, bed by 5331-8052  Sleep interrupted, wakes at 1361-3126  Read on iPad, bathroom  Awake and rise between 9889-5939 depending on hip/back/abdominal pain    Used to eat 5-meals/day with 1/2 cup protein, 1 cup carbohydrate  Now eating 2- meals 1 snack  B: oatmeal, HB egg  S: cheese+crackers  D: hamburger zoe, 1.5 cups french fries- deep fryer, no vegetables  Meat, rice and peas    Daughter " "gets \"testy with her if she tries to go make something for herself\" Desires to get [place] own place in [place] to avoid these tensions.      will only eat raw broccoli and cauliflower  Radha is tired of eating fried potatoes    Living [place] the ranch with : meat+vegetables+rice/potatoes    Dines away from Home: infrequent  Locations:    Who lives in home: lives w/daughter, TAMIA, occ great grandsons sleep over, with recent 19 yo great granddaughter living w/them while going to college  Additional Patient Behavior Information: 3-standard sized poodles    PES: Obesity III, DM2, dyslipidemia, chronic IBS-C r/t environmental stressors, life change events, frequent refined carbs and saturated fats as evidenced by BMI 38, A1c 7%, GFR 58 and     NUTRITION COMMENTS:    Patrica is a 77 yo who moved to [place] and lifestyle practices changed with new boyfriend- more eating out, less healthy practices.    [place] into [facility] [place] and with daughter who cooks all meals, some tension with ngo and what her daughter cooks for her. Patrica states she can cook, but daughter's kitchen is small.    On occasion when she \"feels her blood sugar is low\" will have a Nature's fig bar- not certain if she is low; Oreo cookies, fruit cup.    Radha understands how to care for herself and has staved off DM for many years, frustrations in her lack of independence are her tipping point.     Reinforced small changes and finding support in her life that is helpful to bring her forward.    RTC x next available    Time Spent: 60 minutes     "

## 2024-04-15 ENCOUNTER — TELEPHONE (OUTPATIENT)
Dept: HEALTH INFORMATION MANAGEMENT | Facility: OTHER | Age: 79
End: 2024-04-15
Payer: MEDICARE

## 2024-04-15 NOTE — TELEPHONE ENCOUNTER
"Called patient to triage at request of Care Coordinator regarding voicemail left with concerns of depression symptoms.     \"Been feeling down & out and discouraged lately. But who gives a damn, why do I even bother?\"    Not sleeping well, patient feels like there is nothing to look forward to. Plans often changed due to weather.     Reports issues with managing storage container/properties & de-cluttering and organizing items.     Made appointment for this week with her PCP to discuss depression & medication options. Also discussed benefits of talk therapy.    Drea Vazquez R.N.    "

## 2024-04-18 ENCOUNTER — OFFICE VISIT (OUTPATIENT)
Dept: MEDICAL GROUP | Facility: PHYSICIAN GROUP | Age: 79
End: 2024-04-18
Payer: MEDICARE

## 2024-04-18 VITALS
DIASTOLIC BLOOD PRESSURE: 76 MMHG | HEIGHT: 64 IN | RESPIRATION RATE: 18 BRPM | SYSTOLIC BLOOD PRESSURE: 126 MMHG | WEIGHT: 223 LBS | BODY MASS INDEX: 38.07 KG/M2 | TEMPERATURE: 97.9 F | HEART RATE: 77 BPM | OXYGEN SATURATION: 94 %

## 2024-04-18 DIAGNOSIS — E03.9 HYPOTHYROIDISM, UNSPECIFIED TYPE: ICD-10-CM

## 2024-04-18 DIAGNOSIS — E11.9 TYPE 2 DIABETES MELLITUS WITHOUT COMPLICATION, WITHOUT LONG-TERM CURRENT USE OF INSULIN (HCC): ICD-10-CM

## 2024-04-18 DIAGNOSIS — F32.1 CURRENT MODERATE EPISODE OF MAJOR DEPRESSIVE DISORDER WITHOUT PRIOR EPISODE (HCC): ICD-10-CM

## 2024-04-18 DIAGNOSIS — E78.5 DYSLIPIDEMIA: ICD-10-CM

## 2024-04-18 LAB — RETINAL SCREEN: NEGATIVE

## 2024-04-18 PROCEDURE — 92250 FUNDUS PHOTOGRAPHY W/I&R: CPT | Mod: TC | Performed by: INTERNAL MEDICINE

## 2024-04-18 PROCEDURE — 99214 OFFICE O/P EST MOD 30 MIN: CPT | Performed by: INTERNAL MEDICINE

## 2024-04-18 PROCEDURE — 3078F DIAST BP <80 MM HG: CPT | Performed by: INTERNAL MEDICINE

## 2024-04-18 PROCEDURE — 3074F SYST BP LT 130 MM HG: CPT | Performed by: INTERNAL MEDICINE

## 2024-04-18 ASSESSMENT — PATIENT HEALTH QUESTIONNAIRE - PHQ9
CLINICAL INTERPRETATION OF PHQ2 SCORE: 4
5. POOR APPETITE OR OVEREATING: 2 - MORE THAN HALF THE DAYS
SUM OF ALL RESPONSES TO PHQ QUESTIONS 1-9: 14

## 2024-04-18 ASSESSMENT — FIBROSIS 4 INDEX: FIB4 SCORE: 1.15

## 2024-04-18 NOTE — PATIENT INSTRUCTIONS
Behavioral Health Outpatient [facility] [address]  [place] [address]  Phone: [phone]

## 2024-04-18 NOTE — ASSESSMENT & PLAN NOTE
Chronic ongoing problem, will try metformin 500 mg twice daily due to her significant challenges with constipation.  Had discussed GLP-1's however as they worsen constipation and nausea told her we needed to get that under better control first.  Update A1c and lab work before follow-up visit next month.  Foot exam and retinal exam completed in clinic today.

## 2024-04-18 NOTE — ASSESSMENT & PLAN NOTE
Chronic worsening problem.  PHQ-9 is worsened from a score of 8-14.  Have suggested behavioral health referral placed this last year, she has declined going up until this point but will think about it.  Declines medical therapy.  Will arrange for short-term follow-up in 1 month.

## 2024-04-18 NOTE — PROGRESS NOTES
Subjective:   Chief Complaint/History of Present Illness:  Patrica Qiu is a 78 y.o. female established patient who presents today to discuss medical problems as listed below. Patrica is unaccompanied for today's visit.    Problem   Current Moderate Episode of Major Depressive Disorder Without Prior Episode (Hcc)    Depression Screening    Little interest or pleasure in doing things?  2 - more than half the days   Feeling down, depressed , or hopeless? 2 - more than half the days   Trouble falling or staying asleep, or sleeping too much?  3 - nearly every day   Feeling tired or having little energy?  3 - nearly every day   Poor appetite or overeating?  2 - more than half the days   Feeling bad about yourself - or that you are a failure or have let yourself or your family down? 1 - several days   Trouble concentrating on things, such as reading the newspaper or watching television? 1 - several days   Moving or speaking so slowly that other people could have noticed.  Or the opposite - being so fidgety or restless that you have been moving around a lot more than usual?  0 - not at all   Thoughts that you would be better off dead, or of hurting yourself?  0 - not at all   Patient Health Questionnaire Score: 14     She reports ongoing challenges with her mood since her  passed away and she has relocated from the country to moving to [place] with her daughter.  She spends most of her time reading in her room and has had knowledges adjusting to a new routine.  She feels more apathetic.     Type 2 Diabetes Mellitus Without Complication, Without Long-Term Current Use of Insulin ([facility])     Latest Reference Range & Units 02/08/24 07:39   Glycohemoglobin 4.0 - 5.6 % 7.0 (H)   Estim. Avg Glu mg/dL 154   Fasting Status  Fasting   Cholesterol,Tot 100 - 199 mg/dL 171   Triglycerides 0 - 149 mg/dL 171 (H)   HDL >=40 mg/dL 34 !   LDL <100 mg/dL 103 (H)     No microalbuminuria in 2/2024    She has new diagnosis of type  2 diabetes with worsening over the past several months.  She reports being borderline diabetic since her 30s but was able to control with diet.  She is averse to prescription medicines and would like to try to treat this with her diet.  She reports not following a diabetic diet at this time as she has been with her daughter who prepares her dinners.  She is open to meeting with a nutritionist to see if we can get her blood sugar back down with lifestyle adjustment.    On follow-up in April 2024 she is agreeable to a trial with metformin especially with her significant constipation.  Will start metformin 500 mg twice daily, discussed side effects in detail.     Hypothyroidism     Latest Reference Range & Units 02/08/24 07:39   TSH 0.380 - 5.330 uIU/mL 3.180   Free T-4 0.93 - 1.70 ng/dL 1.40       New finding in April 2023.  She has associated depression and lethargy.  She has a daughter with Hashimoto's.  Normal testing in September 2022.  No preceding viral infections that she recalls.  Feels better since we initiated levothyroxine.    Current regimen: Levothyroxine 88 mcg daily  Previous regimen: Levothyroxine 25-75 mcg daily     Dyslipidemia     Latest Reference Range & Units 02/08/24 07:39   Cholesterol,Tot 100 - 199 mg/dL 171   Triglycerides 0 - 149 mg/dL 171 (H)   HDL >=40 mg/dL 34 !   LDL <100 mg/dL 103 (H)       The 10-year ASCVD risk score (Tam WOLF, et al., 2019) is: 35.2%     Suggested to takes statins in the past and declined, prefers natural or lifestyle treatments to her health.          Current Medications:  Current Outpatient Medications Ordered in Epic   Medication Sig Dispense Refill    metFORMIN (GLUCOPHAGE) 500 MG Tab Take 1 Tablet by mouth 2 times a day with meals. 60 Tablet 2    fluticasone furoate-vilanterol (BREO) 200-25 MCG/ACT AEROSOL POWDER, BREATH ACTIVATED Inhale 1 Puff every day. 180 Each 3    albuterol 108 (90 Base) MCG/ACT Aero Soln inhalation aerosol Inhale 2 Puffs every 6 hours as  "needed for Shortness of Breath. 54 g 3    magnesium gluconate (MAG-G) 500 MG tablet Take 1,000 mg by mouth every day.      cyclobenzaprine (FLEXERIL) 5 mg tablet Take 1-2 Tablets by mouth every evening. 200 Tablet 3    levothyroxine (SYNTHROID) 88 MCG Tab Take 1 Tablet by mouth every morning on an empty stomach. 100 Tablet 3    Sodium Hyaluronate, oral, (HYALURONIC ACID PO) Take 1 Tablet by mouth every day.      NON SPECIFIED Take 1 Tablet by mouth 2 times a day. S-equol + Calcium, menopause seupplemnt Equelle      DIGESTIVE AIDS MIXTURE PO Take  by mouth.      Ascorbic Acid (VITAMIN C) 1000 MG Tab Take 1 Tablet by mouth.      multivitamin (THERAGRAN) Tab Take 1 Tablet by mouth every day.      vitamin D3 (CHOLECALCIFEROL) 1000 Unit (25 mcg) Tab Take 1 Tablet by mouth every day.      vitamin e (VITAMIN E) 400 UNIT Cap Take 1 Capsule by mouth every day.      calcium citrate (CALCITRATE) 950 (200 Ca) MG Tab Take 1 Tablet by mouth every day.      loratadine (CLARITIN) 10 MG Tab Take 1 Tablet by mouth every day.       No current [facility]-ordered facility-administered medications on file.          Objective:   Physical Exam:    Vitals: /76 (BP Location: Left arm, Patient Position: Sitting, BP Cuff Size: Large adult)   Pulse 77   Temp 36.6 °C (97.9 °F) (Temporal)   Resp 18   Ht 1.626 m (5' 4\")   Wt 101 kg (223 lb)   SpO2 94%    BMI: Body mass index is 38.28 kg/m².  Physical Exam  Constitutional:       General: She is not in acute distress.     Appearance: Normal appearance. She is not ill-appearing.   HENT:      Right Ear: Ear canal and external ear normal. There is no impacted cerumen.      Left Ear: External ear normal. There is no impacted cerumen.      Ears:      Comments: Mild erythema left canal     Mouth/Throat:      Pharynx: Posterior oropharyngeal erythema present. No oropharyngeal exudate.   Eyes:      General: No scleral icterus.     Conjunctiva/sclera: Conjunctivae normal.   Cardiovascular:      Rate " and Rhythm: Normal rate and regular rhythm.      Pulses: Normal pulses.   Pulmonary:      Effort: Pulmonary effort is normal. No respiratory distress.      Breath sounds: Wheezing present. No rhonchi.   Musculoskeletal:      Right lower leg: Edema present.      Left lower leg: Edema present.      Comments: 1+ b/l pedal.    Monofilament testing with a 10 gram force: sensation intact: intact bilaterally  Visual Inspection: Feet without maceration, ulcers, fissures.  Pedal pulses: decreased on left     Lymphadenopathy:      Cervical: No cervical adenopathy.   Skin:     General: Skin is warm and dry.      Findings: No rash.   Psychiatric:         Behavior: Behavior normal.         Thought Content: Thought content normal.         Judgment: Judgment normal.      Comments: Increased stress and apathy          Assessment and Plan:   Patrica is a 78 y.o. female with the following:  Problem List Items Addressed This Visit       Current moderate episode of major depressive disorder without prior episode (HCC)     Chronic worsening problem.  PHQ-9 is worsened from a score of 8-14.  Have suggested behavioral health referral placed this last year, she has declined going up until this point but will think about it.  Declines medical therapy.  Will arrange for short-term follow-up in 1 month.         Relevant Orders    Referral to Psychology    Dyslipidemia     Chronic improved problem, LDL doing better, still qualifies for pharmacotherapy but will continue with lifestyle efforts and will arrange for recheck before our visit next month.         Relevant Orders    VITAMIN B12    VITAMIN D,25 HYDROXY (DEFICIENCY)    Lipid Profile    Comp Metabolic Panel    CBC WITH DIFFERENTIAL    Hypothyroidism     Chronic stable problem, last thyroid labs looked great however she is feeling worsening of constipation as well as racing heart mood lability at night with disrupted sleep.  Will update levels to ensure stability, continue medical therapy  with levothyroxine 88 mcg daily.         Relevant Orders    FREE THYROXINE    TSH    VITAMIN D,25 HYDROXY (DEFICIENCY)    Lipid Profile    Comp Metabolic Panel    CBC WITH DIFFERENTIAL    Type 2 diabetes mellitus without complication, without long-term current use of insulin (HCC)     Chronic ongoing problem, will try metformin 500 mg twice daily due to her significant challenges with constipation.  Had discussed GLP-1's however as they worsen constipation and nausea told her we needed to get that under better control first.  Update A1c and lab work before follow-up visit next month.  Foot exam and retinal exam completed in clinic today.         Relevant Medications    metFORMIN (GLUCOPHAGE) 500 MG Tab    Other Relevant Orders    Diabetic Monofilament LE Exam (Completed)    POCT Retinal Eye Exam    HEMOGLOBIN A1C    Lipid Profile    Comp Metabolic Panel    CBC WITH DIFFERENTIAL          RTC: Return in about 1 month (around 5/18/2024).    I spent a total of 38 minutes with record review, exam, communication with the patient, communication with other providers, and documentation of this encounter.    PLEASE NOTE: This dictation was created using voice recognition software. I have made every reasonable attempt to correct obvious errors, but I expect that there are errors of grammar and possibly content that I did not discover before finalizing the note.      Jackelyn Trujillo, DO  Geriatric and Internal Medicine  [facility]

## 2024-04-18 NOTE — ASSESSMENT & PLAN NOTE
Chronic improved problem, LDL doing better, still qualifies for pharmacotherapy but will continue with lifestyle efforts and will arrange for recheck before our visit next month.

## 2024-04-18 NOTE — ASSESSMENT & PLAN NOTE
Chronic stable problem, last thyroid labs looked great however she is feeling worsening of constipation as well as racing heart mood lability at night with disrupted sleep.  Will update levels to ensure stability, continue medical therapy with levothyroxine 88 mcg daily.

## 2024-04-19 ENCOUNTER — PHARMACY VISIT (OUTPATIENT)
Dept: PHARMACY | Facility: MEDICAL CENTER | Age: 79
End: 2024-04-19
Payer: COMMERCIAL

## 2024-04-19 PROCEDURE — RXMED WILLOW AMBULATORY MEDICATION CHARGE: Performed by: INTERNAL MEDICINE

## 2024-05-13 ENCOUNTER — HOSPITAL ENCOUNTER (OUTPATIENT)
Dept: LAB | Facility: MEDICAL CENTER | Age: 79
End: 2024-05-13
Attending: INTERNAL MEDICINE
Payer: MEDICARE

## 2024-05-13 DIAGNOSIS — E78.5 DYSLIPIDEMIA: ICD-10-CM

## 2024-05-13 DIAGNOSIS — E03.9 HYPOTHYROIDISM, UNSPECIFIED TYPE: ICD-10-CM

## 2024-05-13 DIAGNOSIS — E11.9 TYPE 2 DIABETES MELLITUS WITHOUT COMPLICATION, WITHOUT LONG-TERM CURRENT USE OF INSULIN (HCC): ICD-10-CM

## 2024-05-13 LAB
25(OH)D3 SERPL-MCNC: 38 NG/ML (ref 30–100)
ALBUMIN SERPL BCP-MCNC: 4.2 G/DL (ref 3.2–4.9)
ALBUMIN/GLOB SERPL: 1.7 G/DL
ALP SERPL-CCNC: 63 U/L (ref 30–99)
ALT SERPL-CCNC: 44 U/L (ref 2–50)
ANION GAP SERPL CALC-SCNC: 14 MMOL/L (ref 7–16)
AST SERPL-CCNC: 38 U/L (ref 12–45)
BASOPHILS # BLD AUTO: 1.6 % (ref 0–1.8)
BASOPHILS # BLD: 0.08 K/UL (ref 0–0.12)
BILIRUB SERPL-MCNC: 0.4 MG/DL (ref 0.1–1.5)
BUN SERPL-MCNC: 15 MG/DL (ref 8–22)
CALCIUM ALBUM COR SERPL-MCNC: 9.5 MG/DL (ref 8.5–10.5)
CALCIUM SERPL-MCNC: 9.7 MG/DL (ref 8.5–10.5)
CHLORIDE SERPL-SCNC: 104 MMOL/L (ref 96–112)
CHOLEST SERPL-MCNC: 144 MG/DL (ref 100–199)
CO2 SERPL-SCNC: 23 MMOL/L (ref 20–33)
CREAT SERPL-MCNC: 0.9 MG/DL (ref 0.5–1.4)
EOSINOPHIL # BLD AUTO: 0.2 K/UL (ref 0–0.51)
EOSINOPHIL NFR BLD: 4.1 % (ref 0–6.9)
ERYTHROCYTE [DISTWIDTH] IN BLOOD BY AUTOMATED COUNT: 49.5 FL (ref 35.9–50)
EST. AVERAGE GLUCOSE BLD GHB EST-MCNC: 166 MG/DL
FASTING STATUS PATIENT QL REPORTED: NORMAL
GFR SERPLBLD CREATININE-BSD FMLA CKD-EPI: 65 ML/MIN/1.73 M 2
GLOBULIN SER CALC-MCNC: 2.5 G/DL (ref 1.9–3.5)
GLUCOSE SERPL-MCNC: 157 MG/DL (ref 65–99)
HBA1C MFR BLD: 7.4 % (ref 4–5.6)
HCT VFR BLD AUTO: 41.3 % (ref 37–47)
HDLC SERPL-MCNC: 30 MG/DL
HGB BLD-MCNC: 13.1 G/DL (ref 12–16)
IMM GRANULOCYTES # BLD AUTO: 0.02 K/UL (ref 0–0.11)
IMM GRANULOCYTES NFR BLD AUTO: 0.4 % (ref 0–0.9)
LDLC SERPL CALC-MCNC: 80 MG/DL
LYMPHOCYTES # BLD AUTO: 1.67 K/UL (ref 1–4.8)
LYMPHOCYTES NFR BLD: 34.4 % (ref 22–41)
MCH RBC QN AUTO: 29.6 PG (ref 27–33)
MCHC RBC AUTO-ENTMCNC: 31.7 G/DL (ref 32.2–35.5)
MCV RBC AUTO: 93.4 FL (ref 81.4–97.8)
MONOCYTES # BLD AUTO: 0.52 K/UL (ref 0–0.85)
MONOCYTES NFR BLD AUTO: 10.7 % (ref 0–13.4)
NEUTROPHILS # BLD AUTO: 2.37 K/UL (ref 1.82–7.42)
NEUTROPHILS NFR BLD: 48.8 % (ref 44–72)
NRBC # BLD AUTO: 0 K/UL
NRBC BLD-RTO: 0 /100 WBC (ref 0–0.2)
PLATELET # BLD AUTO: 328 K/UL (ref 164–446)
PMV BLD AUTO: 10.1 FL (ref 9–12.9)
POTASSIUM SERPL-SCNC: 4.3 MMOL/L (ref 3.6–5.5)
PROT SERPL-MCNC: 6.7 G/DL (ref 6–8.2)
RBC # BLD AUTO: 4.42 M/UL (ref 4.2–5.4)
SODIUM SERPL-SCNC: 141 MMOL/L (ref 135–145)
T4 FREE SERPL-MCNC: 1.36 NG/DL (ref 0.93–1.7)
TRIGL SERPL-MCNC: 170 MG/DL (ref 0–149)
TSH SERPL DL<=0.005 MIU/L-ACNC: 0.99 UIU/ML (ref 0.38–5.33)
VIT B12 SERPL-MCNC: 1196 PG/ML (ref 211–911)
WBC # BLD AUTO: 4.9 K/UL (ref 4.8–10.8)

## 2024-05-14 ENCOUNTER — OFFICE VISIT (OUTPATIENT)
Dept: MEDICAL GROUP | Facility: PHYSICIAN GROUP | Age: 79
End: 2024-05-14
Payer: MEDICARE

## 2024-05-14 ENCOUNTER — HOSPITAL ENCOUNTER (OUTPATIENT)
Dept: LAB | Facility: MEDICAL CENTER | Age: 79
End: 2024-05-14
Attending: INTERNAL MEDICINE
Payer: MEDICARE

## 2024-05-14 VITALS
HEART RATE: 88 BPM | BODY MASS INDEX: 37.94 KG/M2 | TEMPERATURE: 97.2 F | HEIGHT: 64 IN | RESPIRATION RATE: 20 BRPM | DIASTOLIC BLOOD PRESSURE: 70 MMHG | SYSTOLIC BLOOD PRESSURE: 134 MMHG | WEIGHT: 222.2 LBS | OXYGEN SATURATION: 94 %

## 2024-05-14 DIAGNOSIS — E78.5 DYSLIPIDEMIA: ICD-10-CM

## 2024-05-14 DIAGNOSIS — N18.31 STAGE 3A CHRONIC KIDNEY DISEASE: ICD-10-CM

## 2024-05-14 DIAGNOSIS — F32.1 CURRENT MODERATE EPISODE OF MAJOR DEPRESSIVE DISORDER WITHOUT PRIOR EPISODE (HCC): ICD-10-CM

## 2024-05-14 DIAGNOSIS — L30.4 INTERTRIGO: ICD-10-CM

## 2024-05-14 DIAGNOSIS — E11.9 TYPE 2 DIABETES MELLITUS WITHOUT COMPLICATION, WITHOUT LONG-TERM CURRENT USE OF INSULIN (HCC): ICD-10-CM

## 2024-05-14 LAB
EST. AVERAGE GLUCOSE BLD GHB EST-MCNC: 166 MG/DL
HBA1C MFR BLD: 7.4 % (ref 4–5.6)

## 2024-05-14 PROCEDURE — 99214 OFFICE O/P EST MOD 30 MIN: CPT | Performed by: INTERNAL MEDICINE

## 2024-05-14 PROCEDURE — 3078F DIAST BP <80 MM HG: CPT | Performed by: INTERNAL MEDICINE

## 2024-05-14 PROCEDURE — 3075F SYST BP GE 130 - 139MM HG: CPT | Performed by: INTERNAL MEDICINE

## 2024-05-14 PROCEDURE — G2211 COMPLEX E/M VISIT ADD ON: HCPCS | Performed by: INTERNAL MEDICINE

## 2024-05-14 RX ORDER — DAPAGLIFLOZIN 10 MG/1
10 TABLET, FILM COATED ORAL DAILY
Qty: 30 TABLET | Refills: 2 | Status: SHIPPED | OUTPATIENT
Start: 2024-05-14

## 2024-05-14 RX ORDER — NYSTATIN 100000 [USP'U]/G
1 POWDER TOPICAL 3 TIMES DAILY
Qty: 60 G | Refills: 2 | Status: SHIPPED | OUTPATIENT
Start: 2024-05-14

## 2024-05-14 ASSESSMENT — FIBROSIS 4 INDEX: FIB4 SCORE: 1.36

## 2024-05-14 NOTE — ASSESSMENT & PLAN NOTE
Chronic ongoing problem, she reached out to behavioral health and first available appointment is in early July 2024, she is frustrated by the weight but will plan to meet with them when able.

## 2024-05-14 NOTE — ASSESSMENT & PLAN NOTE
Chronic improved problem, LDL down by 23 points with initiation of red yeast rice extract which she reports she is not consistent with but will continue working on.  No noted side effects.  Continue with periodic evaluation to ensure stability.

## 2024-05-14 NOTE — ASSESSMENT & PLAN NOTE
Chronic and ongoing issue, likely made worse by weight gain and rise in A1c, will have topical nystatin powder available to use during the warmer months and advised to reach out or get seen if she has any significant changes in skin that might indicate infection.

## 2024-05-14 NOTE — PROGRESS NOTES
Subjective:   Chief Complaint/History of Present Illness:  Patrica Qiu is a 78 y.o. female established patient who presents today to discuss medical problems as listed below. Patrica is unaccompanied for today's visit.    Problem   Intertrigo    Reports erythema and itching and skin folds especially around the groin and under the breast.  Is gotten worse with weight gain.  Treats with apple cider vinegar with improvement and knows not to abrasively scratch at the skin.  Would be interested in topical nystatin as needed.     Current Moderate Episode of Major Depressive Disorder Without Prior Episode (Hcc)    Depression Screening    Little interest or pleasure in doing things?  2 - more than half the days   Feeling down, depressed , or hopeless? 2 - more than half the days   Trouble falling or staying asleep, or sleeping too much?  3 - nearly every day   Feeling tired or having little energy?  3 - nearly every day   Poor appetite or overeating?  2 - more than half the days   Feeling bad about yourself - or that you are a failure or have let yourself or your family down? 1 - several days   Trouble concentrating on things, such as reading the newspaper or watching television? 1 - several days   Moving or speaking so slowly that other people could have noticed.  Or the opposite - being so fidgety or restless that you have been moving around a lot more than usual?  0 - not at all   Thoughts that you would be better off dead, or of hurting yourself?  0 - not at all   Patient Health Questionnaire Score: 14     She reports ongoing challenges with her mood since her  passed away and she has [place] from [place] country to moving to [place] with her daughter.  She spends most of her time reading in her room and has had knowledges adjusting to a new routine.  She feels more apathetic.     Type 2 Diabetes Mellitus Without Complication, Without Long-Term Current Use of Insulin ([facility])     Latest Reference Range &  Units 05/13/24 07:08   Glycohemoglobin 4.0 - 5.6 % 7.4 (H)   Estim. Avg Glu mg/dL 166     No microalbuminuria in 2/2024    She has new diagnosis of type 2 diabetes with worsening over the past several months.  She reports being borderline diabetic since her 30s but was able to control with diet.  She is averse to prescription medicines and would like to try to treat this with her diet.  She reports not following a diabetic diet at this time as she has been with her daughter who prepares her dinners.  She is open to meeting with a nutritionist to see if we can get her blood sugar back down with lifestyle adjustment.    On follow-up in April 2024 she is agreeable to a trial with metformin especially with her significant constipation.  Will start metformin 500 mg twice daily, discussed side effects in detail.  Unfortunately metformin caused profound fatigue.  Agreeable to next trying Farxiga.    Current regimen: Farxiga 5 mg daily building up to 10 mg as tolerated  Previous regimen: metformin 500 mg twice daily (SE of fatigue)     Stage 3a Chronic Kidney Disease     Latest Reference Range & Units 02/08/24 07:39 05/13/24 07:08   Bun 8 - 22 mg/dL 24 (H) 15   Creatinine 0.50 - 1.40 mg/dL 1.00 0.90   GFR (CKD-EPI) >60 mL/min/1.73 m 2 58 ! 65     New finding of mild CKD 3A.  She admits to taking ibuprofen/motrin for aches and pains usually few days in a row, sometimes she will take it at night if she wakes up and cannot fall back asleep due to pain.  Tries to be good with her water hydration.  Blood pressure well-controlled.  She does have type 2 diabetes which is trending up, no microalbuminuria noted.    Blood testing doing better with reduction NSAIDs, agreeable to SGLT2 for both diabetes and CKD history.     Dyslipidemia     Latest Reference Range & Units 05/13/24 07:08   Cholesterol,Tot 100 - 199 mg/dL 144   Triglycerides 0 - 149 mg/dL 170 (H)   HDL >=40 mg/dL 30 !   LDL <100 mg/dL 80       The 10-year ASCVD risk score  "(Tam WOLF, et al., 2019) is: 38.3%     Suggested to takes statins in the past and declined, prefers natural or lifestyle treatments to her health.  LDL improved from 103-80 with addition of red yeast rice extract.          Current Medications:  Current Outpatient Medications Ordered in Epic   Medication Sig Dispense Refill    dapagliflozin propanediol (FARXIGA) 10 MG Tab Take 1 Tablet by mouth every day. 30 Tablet 2    nystatin (MYCOSTATIN) powder Apply 1 g topically 3 times a day. 60 g 2    fluticasone furoate-vilanterol (BREO) 200-25 MCG/ACT AEROSOL POWDER, BREATH ACTIVATED Inhale 1 Puff every day. 180 Each 3    albuterol 108 (90 Base) MCG/ACT Aero Soln inhalation aerosol Inhale 2 Puffs every 6 hours as needed for Shortness of Breath. 54 g 3    magnesium gluconate (MAG-G) 500 MG tablet Take 1,000 mg by mouth every day.      cyclobenzaprine (FLEXERIL) 5 mg tablet Take 1-2 Tablets by mouth every evening. 200 Tablet 3    levothyroxine (SYNTHROID) 88 MCG Tab Take 1 Tablet by mouth every morning on an empty stomach. 100 Tablet 3    NON SPECIFIED Take 1 Tablet by mouth 2 times a day. S-equol + Calcium, menopause seupplemnt Equelle      DIGESTIVE AIDS MIXTURE PO Take  by mouth.      Ascorbic Acid (VITAMIN C) 1000 MG Tab Take 1 Tablet by mouth.      multivitamin (THERAGRAN) Tab Take 1 Tablet by mouth every day.      vitamin D3 (CHOLECALCIFEROL) 1000 Unit (25 mcg) Tab Take 1 Tablet by mouth every day.      vitamin e (VITAMIN E) 400 UNIT Cap Take 1 Capsule by mouth every day.      calcium citrate (CALCITRATE) 950 (200 Ca) MG Tab Take 1 Tablet by mouth every day.      loratadine (CLARITIN) 10 MG Tab Take 1 Tablet by mouth every day.       No current [facility]-ordered facility-administered medications on file.          Objective:   Physical Exam:    Vitals: /70 (BP Location: Right arm, Patient Position: Sitting, BP Cuff Size: Adult)   Pulse 88   Temp 36.2 °C (97.2 °F) (Temporal)   Resp 20   Ht 1.626 m (5' 4\")   Wt 101 " kg (222 lb 3.2 oz)   SpO2 94%    BMI: Body mass index is 38.14 kg/m².  Physical Exam  Constitutional:       General: She is not in acute distress.     Appearance: Normal appearance. She is not ill-appearing.   HENT:      Right Ear: External ear normal.      Left Ear: External ear normal.   Eyes:      General: No scleral icterus.     Conjunctiva/sclera: Conjunctivae normal.   Cardiovascular:      Rate and Rhythm: Normal rate and regular rhythm.      Pulses: Normal pulses.   Pulmonary:      Effort: Pulmonary effort is normal. No respiratory distress.      Breath sounds: No wheezing or rhonchi.   Abdominal:      General: There is distension.   Musculoskeletal:      Comments: Mild nonpitting lymphedema bilateral lateral malleoli but no overt pitting edema on pedal and pretibial examination.   Skin:     General: Skin is warm and dry.      Findings: Erythema and rash present.   Neurological:      Gait: Gait normal.   Psychiatric:         Behavior: Behavior normal.         Thought Content: Thought content normal.         Judgment: Judgment normal.      Comments: Feeling somewhat frustrated and down with current living situation and also worried about the health of her daughter with recent blood pressure issue.            Assessment and Plan:   Patrica is a 78 y.o. female with the following:  Problem List Items Addressed This Visit       Current moderate episode of major depressive disorder without prior episode (HCC)     Chronic ongoing problem, she reached out to behavioral health and first available appointment is in early July 2024, she is frustrated by the weight but will plan to meet with them when able.         Dyslipidemia     Chronic improved problem, LDL down by 23 points with initiation of red yeast rice extract which she reports she is not consistent with but will continue working on.  No noted side effects.  Continue with periodic evaluation to ensure stability.         Intertrigo     Chronic and ongoing  issue, likely made worse by weight gain and rise in A1c, will have topical nystatin powder available to use during the warmer months and advised to reach out or get seen if she has any significant changes in skin that might indicate infection.         Relevant Medications    nystatin (MYCOSTATIN) powder    Stage 3a chronic kidney disease     Previous and improved problem, may have been multifactorial from NSAID exposure as well as worsening diabetes.  Doing better now on recheck.  Did not tolerate metformin so we will do trial of SGLT2 which will also help with CKD history.         Type 2 diabetes mellitus without complication, without long-term current use of insulin (HCC)     Chronic and worsening problem, discussed her A1c has trended up from 6.3 to 7.4 in the past year.  She notes it is hard for her to follow a diabetic diet with her current living situation she has an appointment to meet with a nutritionist next month.  Did not tolerate metformin due to profound fatigue and after discussion about the treatment options agreeable to Farxiga starting at 5 mg daily and increasing to 10 mg ulcerated.  Discussed that this can increase risk of genital and urinary infections and to make sure she cleanses very well after urinating and monitoring closely for any signs or symptoms that would indicate infection.  With a 1 month supply and if she does well on it can look into patient assistance options for her moving forward.  Baseline constipation so GLP-1 would likely exacerbate that issue.         Relevant Medications    dapagliflozin propanediol (FARXIGA) 10 MG Tab      She continues to struggle with acclimating since relocating to her daughter's house which has affected not only her physical health with weight gain and worsening of diabetes but also her mental health as she grieves her previous living situation.  Psychology booked out longer than we had hoped unfortunately but she hopes she will be able to work through  some of this as she travels back to where she lives previously to spend a few times per week at her trailer. Continue with close follow up as we work on all of the above.    RTC: Return in about 3 months (around 8/14/2024).    I spent a total of 36 minutes with record review, exam, communication with the patient, communication with other providers, and documentation of this encounter.    PLEASE NOTE: This dictation was created using voice recognition software. I have made every reasonable attempt to correct obvious errors, but I expect that there are errors of grammar and possibly content that I did not discover before finalizing the note.      Jackelyn Trujillo, DO  Geriatric and Internal Medicine  [facility]

## 2024-05-14 NOTE — ASSESSMENT & PLAN NOTE
Previous and improved problem, may have been multifactorial from NSAID exposure as well as worsening diabetes.  Doing better now on recheck.  Did not tolerate metformin so we will do trial of SGLT2 which will also help with CKD history.

## 2024-05-14 NOTE — ASSESSMENT & PLAN NOTE
Chronic and worsening problem, discussed her A1c has trended up from 6.3 to 7.4 in the past year.  She notes it is hard for her to follow a diabetic diet with her current living situation she has an appointment to meet with a nutritionist next month.  Did not tolerate metformin due to profound fatigue and after discussion about the treatment options agreeable to Farxiga starting at 5 mg daily and increasing to 10 mg ulcerated.  Discussed that this can increase risk of genital and urinary infections and to make sure she cleanses very well after urinating and monitoring closely for any signs or symptoms that would indicate infection.  With a 1 month supply and if she does well on it can look into patient assistance options for her moving forward.  Baseline constipation so GLP-1 would likely exacerbate that issue.

## 2024-05-22 ENCOUNTER — OFFICE VISIT (OUTPATIENT)
Dept: BEHAVIORAL HEALTH | Facility: CLINIC | Age: 79
End: 2024-05-22
Payer: MEDICARE

## 2024-05-22 DIAGNOSIS — Z03.89 NO DIAGNOSIS ON AXIS I: ICD-10-CM
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
## 2024-06-03 PROCEDURE — RXMED WILLOW AMBULATORY MEDICATION CHARGE: Performed by: NURSE PRACTITIONER

## 2024-06-07 ENCOUNTER — PHARMACY VISIT (OUTPATIENT)
Dept: PHARMACY | Facility: MEDICAL CENTER | Age: 79
End: 2024-06-07
Payer: COMMERCIAL

## 2024-06-11 DIAGNOSIS — E11.9 TYPE 2 DIABETES MELLITUS WITHOUT COMPLICATION, WITHOUT LONG-TERM CURRENT USE OF INSULIN (HCC): ICD-10-CM

## 2024-06-11 RX ORDER — DAPAGLIFLOZIN 10 MG/1
10 TABLET, FILM COATED ORAL DAILY
Qty: 100 TABLET | Refills: 3 | Status: SHIPPED | OUTPATIENT
Start: 2024-06-11

## 2024-06-11 NOTE — TELEPHONE ENCOUNTER
Received request via: Patient    Was the patient seen in the last year in this department? Yes    Does the patient have an active prescription (recently filled or refills available) for medication(s) requested? No    Pharmacy Name: [facility]    Does the patient have [facility] Plus and need 100 day supply (blood pressure, diabetes and cholesterol meds only)? Yes, quantity updated to 100 days

## 2024-08-19 ENCOUNTER — OFFICE VISIT (OUTPATIENT)
Dept: MEDICAL GROUP | Facility: PHYSICIAN GROUP | Age: 79
End: 2024-08-19
Payer: MEDICARE

## 2024-08-19 VITALS
SYSTOLIC BLOOD PRESSURE: 110 MMHG | TEMPERATURE: 97.6 F | HEIGHT: 64 IN | HEART RATE: 81 BPM | RESPIRATION RATE: 16 BRPM | WEIGHT: 200.8 LBS | OXYGEN SATURATION: 92 % | BODY MASS INDEX: 34.28 KG/M2 | DIASTOLIC BLOOD PRESSURE: 62 MMHG

## 2024-08-19 DIAGNOSIS — E03.9 HYPOTHYROIDISM, UNSPECIFIED TYPE: ICD-10-CM

## 2024-08-19 DIAGNOSIS — E11.9 TYPE 2 DIABETES MELLITUS WITHOUT COMPLICATION, WITHOUT LONG-TERM CURRENT USE OF INSULIN (HCC): ICD-10-CM

## 2024-08-19 DIAGNOSIS — E78.5 DYSLIPIDEMIA: ICD-10-CM

## 2024-08-19 DIAGNOSIS — Z12.31 ENCOUNTER FOR SCREENING MAMMOGRAM FOR BREAST CANCER: ICD-10-CM

## 2024-08-19 DIAGNOSIS — M81.0 AGE-RELATED OSTEOPOROSIS WITHOUT CURRENT PATHOLOGICAL FRACTURE: ICD-10-CM

## 2024-08-19 LAB
HBA1C MFR BLD: 6.5 % (ref ?–5.8)
POCT INT CON NEG: NEGATIVE
POCT INT CON POS: POSITIVE

## 2024-08-19 PROCEDURE — 83036 HEMOGLOBIN GLYCOSYLATED A1C: CPT | Performed by: INTERNAL MEDICINE

## 2024-08-19 PROCEDURE — 99214 OFFICE O/P EST MOD 30 MIN: CPT | Performed by: INTERNAL MEDICINE

## 2024-08-19 PROCEDURE — 3074F SYST BP LT 130 MM HG: CPT | Performed by: INTERNAL MEDICINE

## 2024-08-19 PROCEDURE — 3078F DIAST BP <80 MM HG: CPT | Performed by: INTERNAL MEDICINE

## 2024-08-19 PROCEDURE — G2211 COMPLEX E/M VISIT ADD ON: HCPCS | Performed by: INTERNAL MEDICINE

## 2024-08-19 ASSESSMENT — FIBROSIS 4 INDEX: FIB4 SCORE: 1.36

## 2024-08-19 NOTE — ASSESSMENT & PLAN NOTE
Chronic improved issue, A1c much better on farxiga 10 mg daily and tolerating without issue, continue at this time. LDL is at goal around 80.  Other diabetes screening is up to date.

## 2024-08-19 NOTE — PROGRESS NOTES
Subjective:   Chief Complaint/History of Present Illness:  Patrica Qiu is a 78 y.o. female established patient who presents today to discuss medical problems as listed below. Patrica is unaccompanied for today's visit.    History of Present Illness  The patient is a 78-year-old female who presents for evaluation of multiple medical concerns.    She has been experiencing low energy levels and constipation, which led to her questioning the need for a TSH test. Her constipation persists when she does not take MiraLAX but is alleviated when she does. She also reports abdominal distention and often feels the need to lie down for about half an hour to an hour after eating due to fatigue.    She has a history of smoking and pneumonia. She uses albuterol as needed and fluticasone daily. She has a history of oxygen use but is not currently on it. She occasionally checks her oxygen levels and heart rate during sleep, which tend to be high. She used to require oxygen for a couple of hours a day when her allergies were severe. She reports no respiratory issues or palpitations.    She has been taking Farxiga for a week without any issues. She takes red yeast rice extract and has used cyclobenzaprine 2 or 3 times.    She has osteoporosis and is at the borderline between osteoporosis and osteopenia. She has joined a gym but struggles with commitment. She has lost 22 pounds since May 2024.    She was advised to consider pneumonia and influenza vaccines, and possibly COVID-19 and shingles vaccines. She expressed willingness to receive these vaccines during her doctor's visit but is less inclined to go out of her way to get them.        Problem   Type 2 Diabetes Mellitus Without Complication, Without Long-Term Current Use of Insulin ([facility])     Latest Reference Range & Units 05/14/24 07:10 08/19/24 11:49   Glycohemoglobin 5.8 % 7.4 (H) 6.5 !   Estim. Avg Glu mg/dL 166      No microalbuminuria in 2/2024    She has new  diagnosis of type 2 diabetes with worsening over the past several months.  She reports being borderline diabetic since her 30s but was able to control with diet.  She is averse to prescription medicines and would like to try to treat this with her diet.  She reports not following a diabetic diet at this time as she has been with her daughter who prepares her dinners.  She is open to meeting with a nutritionist to see if we can get her blood sugar back down with lifestyle adjustment.    On follow-up in April 2024 she is agreeable to a trial with metformin especially with her significant constipation.  Will start metformin 500 mg twice daily, discussed side effects in detail.  Unfortunately metformin caused profound fatigue.  Agreeable to next trying Farxiga.    Current regimen: Farxiga 10 mg daily  Previous regimen: metformin 500 mg twice daily (SE of fatigue)     Hypothyroidism     Latest Reference Range & Units 05/13/24 07:08   TSH 0.380 - 5.330 uIU/mL 0.990   Free T-4 0.93 - 1.70 ng/dL 1.36       New finding in April 2023.  She has associated depression and lethargy.  She has a daughter with Hashimoto's.  Normal testing in September 2022.  No preceding viral infections that she recalls.  Feels better since we initiated levothyroxine.    Current regimen: Levothyroxine 88 mcg daily  Previous regimen: Levothyroxine 25-75 mcg daily     Dyslipidemia     Latest Reference Range & Units 02/08/24 07:39 05/13/24 07:08   Cholesterol,Tot 100 - 199 mg/dL 171 144   Triglycerides 0 - 149 mg/dL 171 (H) 170 (H)   HDL >=40 mg/dL 34 ! 30 !   LDL <100 mg/dL 103 (H) 80       The 10-year ASCVD risk score (Tam WOLF, et al., 2019) is: 28%     Suggested to takes statins in the past and declined, prefers natural or lifestyle treatments to her health.  LDL improved from 103 to 80 with addition of red yeast rice extract.     Osteoporosis Without Current Pathological Fracture    Bone Density (11/2022):   lumbar spine T score of -2.5    proximal left femur T score of -0.5      When compared with the most recent study dated 4/9/2007, there has been a 11.8% decrease in the bone mineral density of the proximal left femur.    10-year Probability of Fracture:  Major Osteoporotic     11.1%  Hip     2.1%    She is taking calcium vitamin D.  No known history of fragility fractures.      As of mid November 2023 she is agreeable to trial with alendronate 70 mg weekly. Stopped taking due to issues with remembering to build it in her routine.          Current Medications:  Current Outpatient Medications Ordered in Epic   Medication Sig Dispense Refill    dapagliflozin propanediol (FARXIGA) 10 MG Tab Take 1 Tablet by mouth every day. 100 Tablet 3    nystatin (MYCOSTATIN) powder Apply 1 g topically 3 times a day. 60 g 2    fluticasone furoate-vilanterol (BREO) 200-25 MCG/ACT AEROSOL POWDER, BREATH ACTIVATED Inhale 1 Puff every day. 180 Each 3    albuterol 108 (90 Base) MCG/ACT Aero Soln inhalation aerosol Inhale 2 Puffs every 6 hours as needed for Shortness of Breath. 54 g 3    magnesium gluconate (MAG-G) 500 MG tablet Take 1,000 mg by mouth every day.      cyclobenzaprine (FLEXERIL) 5 mg tablet Take 1-2 Tablets by mouth every evening. 200 Tablet 3    levothyroxine (SYNTHROID) 88 MCG Tab Take 1 Tablet by mouth every morning on an empty stomach. 100 Tablet 3    DIGESTIVE AIDS MIXTURE PO Take  by mouth.      Ascorbic Acid (VITAMIN C) 1000 MG Tab Take 1 Tablet by mouth.      multivitamin (THERAGRAN) Tab Take 1 Tablet by mouth every day.      vitamin D3 (CHOLECALCIFEROL) 1000 Unit (25 mcg) Tab Take 1 Tablet by mouth every day.      vitamin e (VITAMIN E) 400 UNIT Cap Take 1 Capsule by mouth every day.      calcium citrate (CALCITRATE) 950 (200 Ca) MG Tab Take 1 Tablet by mouth every day.      loratadine (CLARITIN) 10 MG Tab Take 1 Tablet by mouth every day.       No current [facility]-ordered facility-administered medications on file.          Objective:   Physical Exam:  "   Vitals: /62 (BP Location: Left arm, Patient Position: Sitting, BP Cuff Size: Adult)   Pulse 81   Temp 36.4 °C (97.6 °F) (Temporal)   Resp 16   Ht 1.626 m (5' 4\")   Wt 91.1 kg (200 lb 12.8 oz)   SpO2 92%    BMI: Body mass index is 34.47 kg/m².  Physical Exam  Constitutional:       General: She is not in acute distress.     Appearance: Normal appearance. She is not ill-appearing.   HENT:      Right Ear: External ear normal.      Left Ear: External ear normal.      Ears:      Comments: Scarring of right TM  Eyes:      General: No scleral icterus.     Conjunctiva/sclera: Conjunctivae normal.   Cardiovascular:      Rate and Rhythm: Normal rate and regular rhythm.      Pulses: Normal pulses.   Pulmonary:      Effort: Pulmonary effort is normal. No respiratory distress.      Breath sounds: No wheezing or rhonchi.   Abdominal:      General: Bowel sounds are normal.      Palpations: Abdomen is soft.      Comments: Improved abdominal fullness   Musculoskeletal:      Right lower leg: No edema.      Left lower leg: No edema.   Lymphadenopathy:      Cervical: No cervical adenopathy.   Skin:     General: Skin is warm and dry.      Findings: No rash.   Psychiatric:         Mood and Affect: Mood normal.         Behavior: Behavior normal.         Thought Content: Thought content normal.         Judgment: Judgment normal.             Results  Laboratory Studies  TSH was within range but closer to being overtreated than undertreated.    Assessment & Plan  1. Hypothyroidism.  Her TSH levels were within the normal range but on the lower end, indicating potential over-treatment. She is currently on levothyroxine. No changes to her medication are recommended at this time.    2. Constipation.  She reports ongoing constipation when not taking MiraLAX, but symptoms are well-controlled with its use. She should continue taking MiraLAX as needed.    3. Chronic Obstructive Pulmonary Disease (COPD).  She has a history of COPD " exacerbations and uses albuterol as needed and fluticasone daily. She is advised to continue with her current inhaler regimen. She was counseled on the importance of receiving the pneumonia and flu vaccines due to her smoking history.    4. Diabetes Mellitus.  She is currently on dapagliflozin (Farxiga) and has experienced significant weight loss, which is expected to improve her diabetes control. An A1c test will be conducted today to assess her current status. She should continue with her current medication and lifestyle changes.    5. Health Maintenance.  She has upcoming mammogram and bone density tests scheduled for this fall. She was also advised to consider the shingles vaccine, although she is hesitant. An A1c test will be conducted today, and she will be notified of any significant changes or concerns based on the results. All other blood work, excluding the A1c, will be ordered.          Assessment and Plan:   Patrica is a 78 y.o. female with the following:  Problem List Items Addressed This Visit       Dyslipidemia     Chronic improved, continue RYRE and recheck levels every 6 months to ensure stability.         Relevant Orders    VITAMIN B12    VITAMIN D,25 HYDROXY (DEFICIENCY)    Lipid Profile    Comp Metabolic Panel    CBC WITH DIFFERENTIAL    Hypothyroidism     Chronic stable problem, no signs/symptoms of overtreatment, continue levothyroxine 88 mcg daily, recheck before follow up in 3 months.         Relevant Orders    FREE THYROXINE    TSH    Comp Metabolic Panel    CBC WITH DIFFERENTIAL    Osteoporosis without current pathological fracture     Chronic ongoing issue, could not remember to take, could consider yearly Reclast or trial monthly Boniva instead, recheck DEXA before follow up and can discuss in more detail. Continue calcium and vitamin D. Weight is down 20 lbs weight healthy diet and exercise with stairs at home.         Relevant Orders    DS-BONE DENSITY STUDY (DEXA)    Type 2 diabetes  mellitus without complication, without long-term current use of insulin (HCC)     Chronic improved issue, A1c much better on farxiga 10 mg daily and tolerating without issue, continue at this time. LDL is at goal around 80.  Other diabetes screening is up to date.         Relevant Orders    POCT  A1C (Completed)    Comp Metabolic Panel    CBC WITH DIFFERENTIAL     Other Visit Diagnoses       Encounter for screening mammogram for breast cancer        Relevant Orders    MA-SCREENING MAMMO BILAT W/TOMOSYNTHESIS W/CAD               RTC: Return in about 3 months (around 11/19/2024).    I spent a total of 34 minutes with record review, exam, communication with the patient, communication with other providers, and documentation of this encounter.    Billing : secondary to the complexity of this patient's illnesses and their interactions.  All problems listed were discussed during the office visit, medications were evaluated and complexities were discussed as well as plan for the future.     Verbal consent was acquired by the patient to use [employer] ambient listening note generation during this visit Yes       PLEASE NOTE: This dictation was created using voice recognition software. I have made every reasonable attempt to correct obvious errors, but I expect that there are errors of grammar and possibly content that I did not discover before finalizing the note.      Jackelyn Trujillo, DO  Geriatric and Internal Medicine  [facility]

## 2024-08-19 NOTE — ASSESSMENT & PLAN NOTE
Chronic stable problem, no signs/symptoms of overtreatment, continue levothyroxine 88 mcg daily, recheck before follow up in 3 months.

## 2024-08-19 NOTE — ASSESSMENT & PLAN NOTE
Chronic ongoing issue, could not remember to take, could consider yearly Reclast or trial monthly Boniva instead, recheck DEXA before follow up and can discuss in more detail. Continue calcium and vitamin D. Weight is down 20 lbs weight healthy diet and exercise with stairs at home.

## 2024-08-23 PROBLEM — N18.30 DIABETES MELLITUS WITH STAGE 3 CHRONIC KIDNEY DISEASE (HCC): Status: ACTIVE | Noted: 2024-08-23

## 2024-08-23 PROBLEM — E11.22 DIABETES MELLITUS WITH STAGE 3 CHRONIC KIDNEY DISEASE (HCC): Status: ACTIVE | Noted: 2024-08-23

## 2024-09-23 PROCEDURE — RXMED WILLOW AMBULATORY MEDICATION CHARGE: Performed by: NURSE PRACTITIONER

## 2024-09-25 ENCOUNTER — PHARMACY VISIT (OUTPATIENT)
Dept: PHARMACY | Facility: MEDICAL CENTER | Age: 79
End: 2024-09-25
Payer: COMMERCIAL

## 2024-09-25 DIAGNOSIS — E03.8 HYPOTHYROIDISM DUE TO HASHIMOTO'S THYROIDITIS: ICD-10-CM

## 2024-09-25 DIAGNOSIS — E06.3 HYPOTHYROIDISM DUE TO HASHIMOTO'S THYROIDITIS: ICD-10-CM

## 2024-09-25 RX ORDER — LEVOTHYROXINE SODIUM 88 UG/1
88 TABLET ORAL
Qty: 100 TABLET | Refills: 3 | Status: SHIPPED | OUTPATIENT
Start: 2024-09-25

## 2024-09-25 NOTE — TELEPHONE ENCOUNTER
Received request via: Pharmacy    Was the patient seen in the last year in this department? Yes    Does the patient have an active prescription (recently filled or refills available) for medication(s) requested? No    Pharmacy Name: [facility]    Does the patient have longterm Plus and need 100-day supply? (This applies to ALL medications) Yes, quantity updated to 100 days

## 2024-10-15 PROCEDURE — RXMED WILLOW AMBULATORY MEDICATION CHARGE: Performed by: INTERNAL MEDICINE

## 2024-10-16 PROCEDURE — RXMED WILLOW AMBULATORY MEDICATION CHARGE: Performed by: INTERNAL MEDICINE

## 2024-10-21 ENCOUNTER — PHARMACY VISIT (OUTPATIENT)
Dept: PHARMACY | Facility: MEDICAL CENTER | Age: 79
End: 2024-10-21
Payer: COMMERCIAL

## 2024-11-13 ENCOUNTER — TELEPHONE (OUTPATIENT)
Dept: HEALTH INFORMATION MANAGEMENT | Facility: OTHER | Age: 79
End: 2024-11-13
Payer: MEDICARE

## 2024-11-13 NOTE — TELEPHONE ENCOUNTER
Pt called to schedule LD LC Screening CT exam. PT is 78 yrs old and has aged out of the program. I will send a msg to Dr RANJIT Tavera, DO

## 2024-11-13 NOTE — TELEPHONE ENCOUNTER
I called Patrica back but reached her voicemail.  She had a [facility] 1 screening Ct on 11/30/24 and turned 78 on [birth year].  I left a voicemail stating my gratitude for her calling back and helping me recognize that letters are being sent in error to screening graduates (she graduated when she turned 78).  I directed her to her PCP to discuss additional monitoring of her lungs if she desires continued imaging. Provided my contact information in case she has questions/concerns. -Dr. Nayeli Tavera

## 2024-11-13 NOTE — TELEPHONE ENCOUNTER
1. Caller Name: Patrica Qiu                          Call Back Number: [phone]      How would the patient prefer to be contacted with a response: Phone call OK to leave a detailed message    PT called to schedule LD LC Screening CT. She was responding to a [employer] Mobile Message.     As of today, pt is 78 years old.   Patient is no longer eligible for the Lung Cancer Screening Program.     Routing this encounter to Dr RANJIT Tavera DO.

## 2024-11-18 ENCOUNTER — HOSPITAL ENCOUNTER (OUTPATIENT)
Dept: RADIOLOGY | Facility: MEDICAL CENTER | Age: 79
End: 2024-11-18
Attending: INTERNAL MEDICINE
Payer: MEDICARE

## 2024-11-18 DIAGNOSIS — M81.0 AGE-RELATED OSTEOPOROSIS WITHOUT CURRENT PATHOLOGICAL FRACTURE: ICD-10-CM

## 2024-11-18 DIAGNOSIS — Z12.31 ENCOUNTER FOR SCREENING MAMMOGRAM FOR BREAST CANCER: ICD-10-CM

## 2024-11-18 PROCEDURE — 77067 SCR MAMMO BI INCL CAD: CPT

## 2024-11-18 PROCEDURE — 77080 DXA BONE DENSITY AXIAL: CPT

## 2024-11-19 ENCOUNTER — HOSPITAL ENCOUNTER (OUTPATIENT)
Dept: LAB | Facility: MEDICAL CENTER | Age: 79
End: 2024-11-19
Attending: INTERNAL MEDICINE
Payer: MEDICARE

## 2024-11-19 DIAGNOSIS — E78.5 DYSLIPIDEMIA: ICD-10-CM

## 2024-11-19 DIAGNOSIS — E03.9 HYPOTHYROIDISM, UNSPECIFIED TYPE: ICD-10-CM

## 2024-11-19 DIAGNOSIS — E11.9 TYPE 2 DIABETES MELLITUS WITHOUT COMPLICATION, WITHOUT LONG-TERM CURRENT USE OF INSULIN (HCC): ICD-10-CM

## 2024-11-19 LAB
25(OH)D3 SERPL-MCNC: 42 NG/ML (ref 30–100)
ALBUMIN SERPL BCP-MCNC: 4.5 G/DL (ref 3.2–4.9)
ALBUMIN/GLOB SERPL: 1.6 G/DL
ALP SERPL-CCNC: 82 U/L (ref 30–99)
ALT SERPL-CCNC: 20 U/L (ref 2–50)
ANION GAP SERPL CALC-SCNC: 12 MMOL/L (ref 7–16)
AST SERPL-CCNC: 23 U/L (ref 12–45)
BASOPHILS # BLD AUTO: 1.2 % (ref 0–1.8)
BASOPHILS # BLD: 0.07 K/UL (ref 0–0.12)
BILIRUB SERPL-MCNC: 0.4 MG/DL (ref 0.1–1.5)
BUN SERPL-MCNC: 20 MG/DL (ref 8–22)
CALCIUM ALBUM COR SERPL-MCNC: 9 MG/DL (ref 8.5–10.5)
CALCIUM SERPL-MCNC: 9.4 MG/DL (ref 8.5–10.5)
CHLORIDE SERPL-SCNC: 104 MMOL/L (ref 96–112)
CHOLEST SERPL-MCNC: 163 MG/DL (ref 100–199)
CO2 SERPL-SCNC: 23 MMOL/L (ref 20–33)
CREAT SERPL-MCNC: 0.88 MG/DL (ref 0.5–1.4)
EOSINOPHIL # BLD AUTO: 0.23 K/UL (ref 0–0.51)
EOSINOPHIL NFR BLD: 4.1 % (ref 0–6.9)
ERYTHROCYTE [DISTWIDTH] IN BLOOD BY AUTOMATED COUNT: 49.3 FL (ref 35.9–50)
FASTING STATUS PATIENT QL REPORTED: NORMAL
GFR SERPLBLD CREATININE-BSD FMLA CKD-EPI: 67 ML/MIN/1.73 M 2
GLOBULIN SER CALC-MCNC: 2.8 G/DL (ref 1.9–3.5)
GLUCOSE SERPL-MCNC: 107 MG/DL (ref 65–99)
HCT VFR BLD AUTO: 44.9 % (ref 37–47)
HDLC SERPL-MCNC: 34 MG/DL
HGB BLD-MCNC: 14.6 G/DL (ref 12–16)
IMM GRANULOCYTES # BLD AUTO: 0.02 K/UL (ref 0–0.11)
IMM GRANULOCYTES NFR BLD AUTO: 0.4 % (ref 0–0.9)
LDLC SERPL CALC-MCNC: 85 MG/DL
LYMPHOCYTES # BLD AUTO: 1.84 K/UL (ref 1–4.8)
LYMPHOCYTES NFR BLD: 32.8 % (ref 22–41)
MCH RBC QN AUTO: 28.9 PG (ref 27–33)
MCHC RBC AUTO-ENTMCNC: 32.5 G/DL (ref 32.2–35.5)
MCV RBC AUTO: 88.7 FL (ref 81.4–97.8)
MONOCYTES # BLD AUTO: 0.41 K/UL (ref 0–0.85)
MONOCYTES NFR BLD AUTO: 7.3 % (ref 0–13.4)
NEUTROPHILS # BLD AUTO: 3.04 K/UL (ref 1.82–7.42)
NEUTROPHILS NFR BLD: 54.2 % (ref 44–72)
NRBC # BLD AUTO: 0 K/UL
NRBC BLD-RTO: 0 /100 WBC (ref 0–0.2)
PLATELET # BLD AUTO: 255 K/UL (ref 164–446)
PMV BLD AUTO: 10 FL (ref 9–12.9)
POTASSIUM SERPL-SCNC: 4.4 MMOL/L (ref 3.6–5.5)
PROT SERPL-MCNC: 7.3 G/DL (ref 6–8.2)
RBC # BLD AUTO: 5.06 M/UL (ref 4.2–5.4)
SODIUM SERPL-SCNC: 139 MMOL/L (ref 135–145)
T4 FREE SERPL-MCNC: 0.99 NG/DL (ref 0.93–1.7)
TRIGL SERPL-MCNC: 221 MG/DL (ref 0–149)
TSH SERPL-ACNC: 3.22 UIU/ML (ref 0.35–5.5)
VIT B12 SERPL-MCNC: 746 PG/ML (ref 211–911)
WBC # BLD AUTO: 5.6 K/UL (ref 4.8–10.8)

## 2024-11-19 PROCEDURE — 84439 ASSAY OF FREE THYROXINE: CPT

## 2024-11-19 PROCEDURE — 82306 VITAMIN D 25 HYDROXY: CPT

## 2024-11-19 PROCEDURE — 84443 ASSAY THYROID STIM HORMONE: CPT

## 2024-11-19 PROCEDURE — 80061 LIPID PANEL: CPT

## 2024-11-19 PROCEDURE — 85025 COMPLETE CBC W/AUTO DIFF WBC: CPT

## 2024-11-19 PROCEDURE — 82607 VITAMIN B-12: CPT

## 2024-11-19 PROCEDURE — 36415 COLL VENOUS BLD VENIPUNCTURE: CPT

## 2024-11-19 PROCEDURE — 80053 COMPREHEN METABOLIC PANEL: CPT

## 2024-11-20 ENCOUNTER — APPOINTMENT (OUTPATIENT)
Dept: MEDICAL GROUP | Facility: PHYSICIAN GROUP | Age: 79
End: 2024-11-20
Payer: MEDICARE

## 2024-11-20 VITALS
OXYGEN SATURATION: 94 % | WEIGHT: 201 LBS | HEART RATE: 82 BPM | TEMPERATURE: 96.7 F | HEIGHT: 64 IN | BODY MASS INDEX: 34.31 KG/M2 | DIASTOLIC BLOOD PRESSURE: 60 MMHG | RESPIRATION RATE: 20 BRPM | SYSTOLIC BLOOD PRESSURE: 110 MMHG

## 2024-11-20 DIAGNOSIS — E78.5 DYSLIPIDEMIA: ICD-10-CM

## 2024-11-20 DIAGNOSIS — K59.09 OTHER CONSTIPATION: ICD-10-CM

## 2024-11-20 DIAGNOSIS — M81.0 AGE-RELATED OSTEOPOROSIS WITHOUT CURRENT PATHOLOGICAL FRACTURE: ICD-10-CM

## 2024-11-20 DIAGNOSIS — E03.9 HYPOTHYROIDISM, UNSPECIFIED TYPE: ICD-10-CM

## 2024-11-20 DIAGNOSIS — E11.9 TYPE 2 DIABETES MELLITUS WITHOUT COMPLICATION, WITHOUT LONG-TERM CURRENT USE OF INSULIN (HCC): ICD-10-CM

## 2024-11-20 DIAGNOSIS — N18.31 STAGE 3A CHRONIC KIDNEY DISEASE: ICD-10-CM

## 2024-11-20 LAB
HBA1C MFR BLD: 6.3 % (ref ?–5.8)
POCT INT CON NEG: NEGATIVE
POCT INT CON POS: POSITIVE

## 2024-11-20 PROCEDURE — 99214 OFFICE O/P EST MOD 30 MIN: CPT | Performed by: INTERNAL MEDICINE

## 2024-11-20 PROCEDURE — 83036 HEMOGLOBIN GLYCOSYLATED A1C: CPT | Performed by: INTERNAL MEDICINE

## 2024-11-20 PROCEDURE — G2211 COMPLEX E/M VISIT ADD ON: HCPCS | Performed by: INTERNAL MEDICINE

## 2024-11-20 PROCEDURE — 3078F DIAST BP <80 MM HG: CPT | Performed by: INTERNAL MEDICINE

## 2024-11-20 PROCEDURE — 3074F SYST BP LT 130 MM HG: CPT | Performed by: INTERNAL MEDICINE

## 2024-11-20 ASSESSMENT — FIBROSIS 4 INDEX: FIB4 SCORE: 1.57

## 2024-11-20 NOTE — ASSESSMENT & PLAN NOTE
Chronic ongoing problem, TSH back up to the 3 range, free T4 borderline low but we agreed to continue current regimen at this time since she otherwise feels well.  Continue levothyroxine 88 mcg daily.  Follow-up on blood work periodically to ensure ongoing stability.

## 2024-11-20 NOTE — ASSESSMENT & PLAN NOTE
Previous problem, improved, GFR now maintaining above 60 for the past 2 lab checks.  Continue with good oral hydration and good control blood sugar and blood pressure.

## 2024-11-20 NOTE — ASSESSMENT & PLAN NOTE
Chronic and improved problem, LDL down in the low to mid 80 range.  Continue red yeast rice extract and regular follow-up.

## 2024-11-20 NOTE — PROGRESS NOTES
Subjective:   Chief Complaint/History of Present Illness:  Patrica Qiu is a 78 y.o. female established patient who presents today to discuss medical problems as listed below. Patrica is unaccompanied for today's visit.    Problem   Type 2 Diabetes Mellitus Without Complication, Without Long-Term Current Use of Insulin ([facility])     Latest Reference Range & Units 11/20/24 07:25   Glycohemoglobin <=5.8 % 6.3 !     No microalbuminuria in 2/2024    She has new diagnosis of type 2 diabetes with worsening over the past several months.  She reports being borderline diabetic since her 30s but was able to control with diet.  She is averse to prescription medicines and would like to try to treat this with her diet.  She reports not following a diabetic diet at this time as she has been with her daughter who prepares her dinners.  She is open to meeting with a nutritionist to see if we can get her blood sugar back down with lifestyle adjustment.    On follow-up in April 2024 she is agreeable to a trial with metformin especially with her significant constipation.  Will start metformin 500 mg twice daily, discussed side effects in detail.  Unfortunately metformin caused profound fatigue.  Agreeable to next trying Farxiga.    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 65     New finding of mild CKD 3A.  She admits to taking ibuprofen/motrin for aches and pains usually few days in a row, sometimes she will take it at night if she wakes up and cannot fall back asleep due to pain.  Tries to be good with her water hydration.  Blood pressure well-controlled.  She does have type 2 diabetes which is trending up, no microalbuminuria noted.    Blood testing doing  better with reduction NSAIDs, agreeable to SGLT2 for both diabetes and CKD history.     Hypothyroidism     Latest Reference Range & Units 11/19/24 07:19   Free T-4 0.93 - 1.70 ng/dL 0.99   TSH 0.350 - 5.500 uIU/mL 3.220       New finding in April 2023.  She has associated depression and lethargy.  She has a daughter with Hashimoto's.  Normal testing in September 2022.  No preceding viral infections that she recalls.  Feels better since we initiated levothyroxine.    Current regimen: Levothyroxine 88 mcg daily  Previous regimen: Levothyroxine 25-75 mcg daily     Bmi 34.0-34.9,Adult    After weight gain a few years ago she has successfully lost 20 pounds through her diet.  BMI down to 34, was up at 36 with worsening diabetes which is also improved at this time.     Dyslipidemia     Latest Reference Range & Units 11/19/24 07:19   Cholesterol,Tot 100 - 199 mg/dL 163   Triglycerides 0 - 149 mg/dL 221 (H)   HDL >=40 mg/dL 34 !   LDL <100 mg/dL 85     The 10-year ASCVD risk score (Tam WOLF, et al., 2019) is: 28.3%     Suggested to takes statins in the past and declined, prefers natural or lifestyle treatments to her health.  LDL improved from 103 to 80 with addition of red yeast rice extract.     Other Constipation    She reports issues with IBS-C. She is trying to manage this with OTC treatments including fibers, stool softeners, and laxatives. She has not taken prescription medicine for this issues. She tries to have good water intake but reports this can be a struggle.           Current Medications:  Current Outpatient Medications Ordered in Epic   Medication Sig Dispense Refill    levothyroxine (SYNTHROID) 88 MCG Tab TAKE 1 TABLET BY MOUTH IN THE MORNING ON AN EMPTY STOMACH 100 Tablet 3    dapagliflozin propanediol (FARXIGA) 10 MG Tab Take 1 Tablet by mouth every day. 100 Tablet 3    nystatin (MYCOSTATIN) powder Apply 1 g topically 3 times a day. 60 g 2    fluticasone furoate-vilanterol (BREO) 200-25 MCG/ACT AEROSOL  "POWDER, BREATH ACTIVATED Inhale 1 Puff every day. 180 Each 3    albuterol 108 (90 Base) MCG/ACT Aero Soln inhalation aerosol Inhale 2 Puffs every 6 hours as needed for Shortness of Breath. 54 g 3    magnesium gluconate (MAG-G) 500 MG tablet Take 1,000 mg by mouth every day.      cyclobenzaprine (FLEXERIL) 5 mg tablet Take 1-2 Tablets by mouth every evening. 200 Tablet 3    DIGESTIVE AIDS MIXTURE PO Take  by mouth.      Ascorbic Acid (VITAMIN C) 1000 MG Tab Take 1 Tablet by mouth.      multivitamin (THERAGRAN) Tab Take 1 Tablet by mouth every day.      vitamin D3 (CHOLECALCIFEROL) 1000 Unit (25 mcg) Tab Take 1 Tablet by mouth every day.      vitamin e (VITAMIN E) 400 UNIT Cap Take 1 Capsule by mouth every day.      calcium citrate (CALCITRATE) 950 (200 Ca) MG Tab Take 1 Tablet by mouth every day.      loratadine (CLARITIN) 10 MG Tab Take 1 Tablet by mouth every day.       No current [facility]-ordered facility-administered medications on file.          Objective:   Physical Exam:    Vitals: /60 (BP Location: Left arm, Patient Position: Sitting, BP Cuff Size: Adult)   Pulse 82   Temp 35.9 °C (96.7 °F) (Temporal)   Resp 20   Ht 1.626 m (5' 4\")   Wt 91.2 kg (201 lb)   SpO2 94%    BMI: Body mass index is 34.5 kg/m².  Physical Exam  Constitutional:       General: She is not in acute distress.     Appearance: Normal appearance. She is not ill-appearing.   HENT:      Right Ear: External ear normal. There is no impacted cerumen.      Left Ear: External ear normal. There is no impacted cerumen.      Ears:      Comments: Scarring on right TM     Mouth/Throat:      Pharynx: Posterior oropharyngeal erythema present. No oropharyngeal exudate.   Eyes:      General: No scleral icterus.     Conjunctiva/sclera: Conjunctivae normal.   Neck:      Vascular: No carotid bruit.      Comments: Mild anterior cervical adenopathy  Cardiovascular:      Rate and Rhythm: Normal rate and regular rhythm.      Pulses: Normal pulses. "   Pulmonary:      Effort: Pulmonary effort is normal. No respiratory distress.      Breath sounds: No wheezing or rhonchi.   Abdominal:      General: Bowel sounds are normal.      Palpations: Abdomen is soft.   Musculoskeletal:      Comments: Mild lymphedema b/l LE   Skin:     General: Skin is warm and dry.      Findings: No rash.   Psychiatric:         Mood and Affect: Mood normal.         Behavior: Behavior normal.         Thought Content: Thought content normal.         Judgment: Judgment normal.            Assessment and Plan:   Patrica is a 78 y.o. female with the following:  Problem List Items Addressed This Visit       BMI 34.0-34.9,adult     Chronic and improved problem, she has reduced her weight by 20 pounds and maintain this, commended her on this achievement and encouraged her to continue with healthy dietary habits.         Dyslipidemia     Chronic and improved problem, LDL down in the low to mid 80 range.  Continue red yeast rice extract and regular follow-up.         Relevant Orders    Lipid Profile    Comp Metabolic Panel    CBC WITH DIFFERENTIAL    Hypothyroidism     Chronic ongoing problem, TSH back up to the 3 range, free T4 borderline low but we agreed to continue current regimen at this time since she otherwise feels well.  Continue levothyroxine 88 mcg daily.  Follow-up on blood work periodically to ensure ongoing stability.         Relevant Orders    FREE THYROXINE    TSH    Comp Metabolic Panel    CBC WITH DIFFERENTIAL    Other constipation     Chronic ongoing problem, since adding MiraLAX this has improved, now to maintenance dose of half a capful daily which continues to work.         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  Continue with good oral hydration and good control blood sugar and blood pressure.         Relevant Orders    Comp Metabolic Panel    CBC WITH DIFFERENTIAL    Type 2 diabetes mellitus  without complication, without long-term current use of insulin (HCC)     Chronic and improved problem, A1c well-controlled with dietary changes and current medicine, continue at this time.  Continue with regular assessment.  Commended her on improvement of A1c down to the prediabetic range.         Relevant Orders    POCT A1C (Completed)    VITAMIN B12    VITAMIN D,25 HYDROXY (DEFICIENCY)    HEMOGLOBIN A1C    MICROALBUMIN CREAT RATIO URINE    Comp Metabolic Panel    CBC WITH DIFFERENTIAL          RTC: Return in about 4 months (around 3/20/2025).    I spent a total of 34 minutes with record review, exam, communication with the patient, communication with other providers, and documentation of this encounter.      PLEASE NOTE: This dictation was created using voice recognition software. I have made every reasonable attempt to correct obvious errors, but I expect that there are errors of grammar and possibly content that I did not discover before finalizing the note.    Billing : secondary to the complexity of this patient's illnesses and their interactions.  All problems listed were discussed during the office visit, medications were evaluated and complexities were discussed as well as plan for the future.     Jackelyn Trujillo, DO  Geriatric and Internal Medicine  [facility]

## 2024-11-20 NOTE — ASSESSMENT & PLAN NOTE
Chronic and improved problem, she has reduced her weight by 20 pounds and maintain this, commended her on this achievement and encouraged her to continue with healthy dietary habits.

## 2024-11-20 NOTE — ASSESSMENT & PLAN NOTE
Chronic ongoing problem, since adding MiraLAX this has improved, now to maintenance dose of half a capful daily which continues to work.

## 2024-11-20 NOTE — ASSESSMENT & PLAN NOTE
Chronic and improved problem, A1c well-controlled with dietary changes and current medicine, continue at this time.  Continue with regular assessment.  Commended her on improvement of A1c down to the prediabetic range.

## 2024-12-10 ENCOUNTER — PHARMACY VISIT (OUTPATIENT)
Dept: PHARMACY | Facility: MEDICAL CENTER | Age: 79
End: 2024-12-10
Payer: COMMERCIAL

## 2024-12-10 PROCEDURE — RXMED WILLOW AMBULATORY MEDICATION CHARGE: Performed by: NURSE PRACTITIONER

## 2025-01-07 DIAGNOSIS — E06.3 HYPOTHYROIDISM DUE TO HASHIMOTO'S THYROIDITIS: ICD-10-CM

## 2025-01-07 PROCEDURE — RXMED WILLOW AMBULATORY MEDICATION CHARGE: Performed by: NURSE PRACTITIONER

## 2025-01-07 RX ORDER — LEVOTHYROXINE SODIUM 88 UG/1
88 TABLET ORAL
Qty: 100 TABLET | Refills: 3 | Status: SHIPPED | OUTPATIENT
Start: 2025-01-07

## 2025-01-07 NOTE — TELEPHONE ENCOUNTER
Received request via: Patient    Was the patient seen in the last year in this department? Yes    Does the patient have an active prescription (recently filled or refills available) for medication(s) requested? No    Pharmacy Name: [facility]     Does the patient have FDC Plus and need 100-day supply? (This applies to ALL medications) Yes, quantity updated to 100 days

## 2025-01-08 ENCOUNTER — PHARMACY VISIT (OUTPATIENT)
Dept: PHARMACY | Facility: MEDICAL CENTER | Age: 80
End: 2025-01-08
Payer: COMMERCIAL

## 2025-02-08 PROCEDURE — RXMED WILLOW AMBULATORY MEDICATION CHARGE: Performed by: NURSE PRACTITIONER

## 2025-02-19 ENCOUNTER — PHARMACY VISIT (OUTPATIENT)
Dept: PHARMACY | Facility: MEDICAL CENTER | Age: 80
End: 2025-02-19
Payer: COMMERCIAL

## 2025-02-25 NOTE — ASSESSMENT & PLAN NOTE
Chronic, stable. Last A1c 6.3 as of 2024. Last monofilament foot exam: 2024, last urine microalb/creat:2024 , last retinal screenin2024. Maintains on Farxiga 10mg daily. Continue to advise low carbohydrate diet with regular physical activity. Continue current treatment regime. Follow up with PCP as scheduled for continued monitoring and management.

## 2025-02-25 NOTE — ASSESSMENT & PLAN NOTE
Chronic, stable. Most recent lipid panel from 11/2024 with LDL WNL. Pt maintains on RYRE.  Recommend Mediterranean diet and regular physical activity. Follow up with PCP at least annually for continued monitoring and management.   Lab Results   Component Value Date/Time    CHOLSTRLTOT 163 11/19/2024 07:19 AM    LDL 85 11/19/2024 07:19 AM    HDL 34 (A) 11/19/2024 07:19 AM    TRIGLYCERIDE 221 (H) 11/19/2024 07:19 AM

## 2025-02-25 NOTE — ASSESSMENT & PLAN NOTE
Chronic, ongoing. PHQ today x/x. She saw  once last year. Follow up with PCP at least annually for continued monitoring and management.

## 2025-02-26 ENCOUNTER — APPOINTMENT (OUTPATIENT)
Dept: FAMILY PLANNING/WOMEN'S HEALTH CLINIC | Facility: PHYSICIAN GROUP | Age: 80
End: 2025-02-26
Payer: MEDICARE

## 2025-02-26 DIAGNOSIS — E78.5 DYSLIPIDEMIA: ICD-10-CM

## 2025-02-26 DIAGNOSIS — J45.50 SEVERE PERSISTENT ASTHMA WITHOUT COMPLICATION: ICD-10-CM

## 2025-02-26 DIAGNOSIS — J44.9 CHRONIC OBSTRUCTIVE PULMONARY DISEASE, UNSPECIFIED COPD TYPE (HCC): ICD-10-CM

## 2025-02-26 DIAGNOSIS — E11.9 TYPE 2 DIABETES MELLITUS WITHOUT COMPLICATION, WITHOUT LONG-TERM CURRENT USE OF INSULIN (HCC): ICD-10-CM

## 2025-02-26 DIAGNOSIS — E03.9 HYPOTHYROIDISM, UNSPECIFIED TYPE: ICD-10-CM

## 2025-02-26 DIAGNOSIS — F32.1 CURRENT MODERATE EPISODE OF MAJOR DEPRESSIVE DISORDER WITHOUT PRIOR EPISODE (HCC): ICD-10-CM

## 2025-03-01 NOTE — ASSESSMENT & PLAN NOTE
Chronic, stable. Last A1c 6.3 as of 2024. Last monofilament foot exam: 2024, last urine microalb/creat:2024 , last retinal screenin2024. Maintains on Farxiga 10mg daily. Continue to advise low carbohydrate diet with regular physical activity. Continue current treatment regime. Follow up with PCP as scheduled for continued monitoring and management.    with patient

## 2025-03-05 ENCOUNTER — TELEPHONE (OUTPATIENT)
Dept: MEDICAL GROUP | Facility: PHYSICIAN GROUP | Age: 80
End: 2025-03-05

## 2025-03-05 ENCOUNTER — TELEMEDICINE (OUTPATIENT)
Dept: MEDICAL GROUP | Facility: PHYSICIAN GROUP | Age: 80
End: 2025-03-05
Payer: MEDICARE

## 2025-03-05 ENCOUNTER — APPOINTMENT (OUTPATIENT)
Dept: FAMILY PLANNING/WOMEN'S HEALTH CLINIC | Facility: PHYSICIAN GROUP | Age: 80
End: 2025-03-05
Payer: MEDICARE

## 2025-03-05 VITALS — HEART RATE: 72 BPM | OXYGEN SATURATION: 92 % | TEMPERATURE: 100.1 F

## 2025-03-05 DIAGNOSIS — J45.50 SEVERE PERSISTENT ASTHMA WITHOUT COMPLICATION: ICD-10-CM

## 2025-03-05 DIAGNOSIS — M81.0 AGE-RELATED OSTEOPOROSIS WITHOUT CURRENT PATHOLOGICAL FRACTURE: ICD-10-CM

## 2025-03-05 DIAGNOSIS — E78.5 DYSLIPIDEMIA: ICD-10-CM

## 2025-03-05 DIAGNOSIS — E11.9 TYPE 2 DIABETES MELLITUS WITHOUT COMPLICATION, WITHOUT LONG-TERM CURRENT USE OF INSULIN (HCC): ICD-10-CM

## 2025-03-05 DIAGNOSIS — N18.31 STAGE 3A CHRONIC KIDNEY DISEASE: ICD-10-CM

## 2025-03-05 DIAGNOSIS — J44.1 COPD EXACERBATION (HCC): ICD-10-CM

## 2025-03-05 DIAGNOSIS — F32.1 CURRENT MODERATE EPISODE OF MAJOR DEPRESSIVE DISORDER WITHOUT PRIOR EPISODE (HCC): ICD-10-CM

## 2025-03-05 DIAGNOSIS — J44.9 CHRONIC OBSTRUCTIVE PULMONARY DISEASE, UNSPECIFIED COPD TYPE (HCC): ICD-10-CM

## 2025-03-05 PROCEDURE — 99213 OFFICE O/P EST LOW 20 MIN: CPT | Mod: 95 | Performed by: INTERNAL MEDICINE

## 2025-03-05 RX ORDER — BENZONATATE 100 MG/1
100 CAPSULE ORAL 3 TIMES DAILY PRN
Qty: 60 CAPSULE | Refills: 0 | Status: SHIPPED | OUTPATIENT
Start: 2025-03-05

## 2025-03-05 NOTE — TELEPHONE ENCOUNTER
1. Caller Name: Patrica Qiu                          Call Back Number: There are no phone numbers on file.        How would the patient prefer to be contacted with a response: Phone call OK to leave a detailed message    Patient called to say she has been running low grade fever and not feeling great for a couple of days  Daughter Home tested her today and she is positive for covid.   What does she do

## 2025-03-06 ENCOUNTER — TELEPHONE (OUTPATIENT)
Dept: MEDICAL GROUP | Facility: PHYSICIAN GROUP | Age: 80
End: 2025-03-06
Payer: MEDICARE

## 2025-03-06 NOTE — PROGRESS NOTES
Virtual Visit: Established Patient   This visit was conducted via  [employer]  using secure and encrypted videoconferencing technology.   The patient was in their home in the state [place].    The patient's identity was confirmed and verbal consent was obtained for this virtual visit.    Subjective:   CC: No chief complaint on file.    Patrica Qiu is a 79 y.o. female presenting for evaluation and management of:    Problem   Copd Exacerbation (Hcc)    Approximately 7-10 days ago she developed typical upper respiratory symptoms with cough, sore throat, headache and fevers and chills. She has history of COPD and uses maintenance and rescue inhaler as needed. She felt like she was improving after the initial infection and then worsened again more abruptly yesterday with coughing attacks at night. She is using over the counter treatment with marginal improvement. Tested positive for Covid by her daughter today but agrees it likely started 7-10 days ago, had positive exposure from her grandson.            ROS   Cough, fever, headache, sore throat, loss of voice    Current medicines (including changes today)  Current Outpatient Medications   Medication Sig Dispense Refill    benzonatate (TESSALON) 100 MG Cap Take 1 Capsule by mouth 3 times a day as needed for Cough. 60 Capsule 0    amoxicillin-clavulanate (AUGMENTIN) 875-125 MG Tab Take 1 Tablet by mouth 2 times a day. 14 Tablet 0    levothyroxine (SYNTHROID) 88 MCG Tab Take 1 Tablet by mouth every morning on an empty stomach. 100 Tablet 3    dapagliflozin propanediol (FARXIGA) 10 MG Tab Take 1 Tablet by mouth every day. 100 Tablet 3    nystatin (MYCOSTATIN) powder Apply 1 g topically 3 times a day. 60 g 2    fluticasone furoate-vilanterol (BREO) 200-25 MCG/ACT AEROSOL POWDER, BREATH ACTIVATED Inhale 1 Puff every day. 180 Each 3    albuterol 108 (90 Base) MCG/ACT Aero Soln inhalation aerosol Inhale 2 Puffs every 6 hours as needed for Shortness of Breath. 54 g 3     magnesium gluconate (MAG-G) 500 MG tablet Take 1,000 mg by mouth every day.      cyclobenzaprine (FLEXERIL) 5 mg tablet Take 1-2 Tablets by mouth every evening. 200 Tablet 3    DIGESTIVE AIDS MIXTURE PO Take  by mouth.      Ascorbic Acid (VITAMIN C) 1000 MG Tab Take 1 Tablet by mouth.      multivitamin (THERAGRAN) Tab Take 1 Tablet by mouth every day.      vitamin D3 (CHOLECALCIFEROL) 1000 Unit (25 mcg) Tab Take 1 Tablet by mouth every day.      vitamin e (VITAMIN E) 400 UNIT Cap Take 1 Capsule by mouth every day.      calcium citrate (CALCITRATE) 950 (200 Ca) MG Tab Take 1 Tablet by mouth every day.      loratadine (CLARITIN) 10 MG Tab Take 1 Tablet by mouth every day.       No current facility-administered medications for this visit.       Patient Active Problem List    Diagnosis Date Noted    COPD exacerbation (HCC) 03/05/2025    Intertrigo 05/14/2024    Current moderate episode of major depressive disorder without prior episode ([facility]) 02/13/2024    Type 2 diabetes mellitus without complication, without long-term current use of insulin ([facility]) 02/12/2024    Dermatitis 02/12/2024    Stage 2/3a chronic kidney disease 02/12/2024    Iron deficiency 05/17/2023    Hypothyroidism 05/17/2023    BMI 34.0-34.9,adult 11/10/2022    Dyslipidemia 11/10/2022    Chronic low back pain 11/10/2022    Osteoporosis without current pathological fracture 11/10/2022    Neuropathy 11/10/2022    Pre-diabetes 10/12/2022    Other constipation 09/21/2022    Chronic obstructive pulmonary disease (HCC) 08/22/2022    Severe persistent asthma without complication 08/17/2022    Hx of smoking 07/13/2022        Objective:   Pulse 72   Temp 37.8 °C (100.1 °F)   SpO2 92%     Physical Exam:  Constitutional: Alert, appears mildly ill but no acute distress  Skin: No rashes in visible areas.  Eye: Round. Conjunctiva clear, lids normal. No icterus.   ENMT: Lips pink without lesions, hoarse voice  Neck: No masses, no thyromegaly.   Respiratory: Unlabored  respiratory effort, + coughing  Psych: Alert and oriented, normal affect and mood.     Assessment and Plan:   The following treatment plan was discussed:     Problem List Items Addressed This Visit       COPD exacerbation (HCC)    New issue, arranged for virtual visit promptly to discuss positive home Covid test. Upon discussion Covid likely started 7-10 days ago so out of treatment window. She has productive cough and likely now dealing with a COPD exacerbation. Will treat with Augmentin and she is to update me in a few days with how she is doing and continue monitoring her oxygen, heart rate, temperature, and blood pressure levels. ER evaluation not indicated at this time but may need to have her come by the office or be seen for an in person exam if she does not start improving soon. She agrees. Will also prescribe tessalon pearles to help with cough attacks. She voiced understanding and was appreciate of my squeezing her in quickly for the virtual visit.         Relevant Medications    benzonatate (TESSALON) 100 MG Cap    amoxicillin-clavulanate (AUGMENTIN) 875-125 MG Tab         Follow-up: Return if symptoms worsen or fail to improve.         I spent a total of 20 minutes with record review, exam, communication with the patient, communication with other providers, and documentation of this encounter.    Jackelyn Trujillo, DO  Geriatric and Internal Medicine  [facility]

## 2025-03-06 NOTE — ASSESSMENT & PLAN NOTE
New issue, arranged for virtual visit promptly to discuss positive home Covid test. Upon discussion Covid likely started 7-10 days ago so out of treatment window. She has productive cough and likely now dealing with a COPD exacerbation. Will treat with Augmentin and she is to update me in a few days with how she is doing and continue monitoring her oxygen, heart rate, temperature, and blood pressure levels. ER evaluation not indicated at this time but may need to have her come by the office or be seen for an in person exam if she does not start improving soon. She agrees. Will also prescribe tessalon pearles to help with cough attacks. She voiced understanding and was appreciate of my squeezing her in quickly for the virtual visit.

## 2025-03-07 NOTE — ASSESSMENT & PLAN NOTE
Chronic, ongoing. Pt maintains on levothyroxine 88mcg daily with TSH wnl as of 11/2024. Continue current treatment regime. Follow up with PCP at least annually for continued monitoring and management.  .   Latest Reference Range & Units 11/19/24 07:19   TSH 0.350 - 5.500 uIU/mL 3.220

## 2025-03-07 NOTE — ASSESSMENT & PLAN NOTE
Chronic, ongoing. Last DEXA 2024 with osteopenia of the lumbar spine with T score of -1.9. Denies hx of fragility fracture. Advise calcium and vitamin D supplementation with weightbearing exercises as tolerated. Follow up with PCP at least annually for continued monitoring and management.

## 2025-03-07 NOTE — TELEPHONE ENCOUNTER
1. Caller Name: Patrica Qiu                          Call Back Number: There are no phone numbers on file.        How would the patient prefer to be contacted with a response: Phone call OK to leave a detailed message    Patient called to give an update   Not coughing as much achy but feels like she is on the mend.  BP this am at 7am 81/58  111/72 at 4:45pm    SAO2 93%  Pulse 80    BP at time of call 93/81

## 2025-03-11 ENCOUNTER — APPOINTMENT (OUTPATIENT)
Dept: FAMILY PLANNING/WOMEN'S HEALTH CLINIC | Facility: PHYSICIAN GROUP | Age: 80
End: 2025-03-11
Payer: MEDICARE

## 2025-03-11 DIAGNOSIS — J45.50 SEVERE PERSISTENT ASTHMA WITHOUT COMPLICATION: ICD-10-CM

## 2025-03-11 DIAGNOSIS — E78.5 DYSLIPIDEMIA: ICD-10-CM

## 2025-03-11 DIAGNOSIS — F32.1 CURRENT MODERATE EPISODE OF MAJOR DEPRESSIVE DISORDER WITHOUT PRIOR EPISODE (HCC): ICD-10-CM

## 2025-03-11 DIAGNOSIS — E11.9 TYPE 2 DIABETES MELLITUS WITHOUT COMPLICATION, WITHOUT LONG-TERM CURRENT USE OF INSULIN (HCC): ICD-10-CM

## 2025-03-11 DIAGNOSIS — E03.9 HYPOTHYROIDISM, UNSPECIFIED TYPE: ICD-10-CM

## 2025-03-11 DIAGNOSIS — J44.9 CHRONIC OBSTRUCTIVE PULMONARY DISEASE, UNSPECIFIED COPD TYPE (HCC): ICD-10-CM

## 2025-03-11 DIAGNOSIS — M81.0 AGE-RELATED OSTEOPOROSIS WITHOUT CURRENT PATHOLOGICAL FRACTURE: ICD-10-CM

## 2025-03-11 PROBLEM — J44.1 COPD EXACERBATION (HCC): Status: RESOLVED | Noted: 2025-03-05 | Resolved: 2025-03-11

## 2025-03-14 NOTE — ASSESSMENT & PLAN NOTE
Chronic, ongoing. PHQ today 15/27, denying SI. She endorses loneliness and is interested in meeting people. She saw  once last year which she didn't find helpful. Discussed social clubs, art clubs and senior rec centers available in [place]. Follow up with PCP for continued monitoring and management.

## 2025-03-14 NOTE — ASSESSMENT & PLAN NOTE
Chronic, ongoing. Prior history of smoking. Currently maintains on Breo Ellipta daily and albuterol inhaler PRN, presently daily following COVID infx. Follow up with pulmonology per routine

## 2025-03-14 NOTE — ASSESSMENT & PLAN NOTE
Chronic, stable. Last A1c 7.0 as of 3/2025. Last monofilament foot exam: 2024, last urine microalb/creat:2024 , last retinal screenin2024. Maintains on Farxiga 10mg daily. Continue to advise low carbohydrate diet with regular physical activity. Continue current treatment regime. Follow up with PCP as scheduled for continued monitoring and management.

## 2025-03-14 NOTE — ASSESSMENT & PLAN NOTE
Chronic, ongoing. Last DEXA 2024 with osteopenia of the lumbar spine with T score of -1.9. Denies hx of fragility fracture. Advise calcium and vitamin D supplementation with weightbearing exercises as tolerated. She reports less exercise following the passing of her  a couple yeas ago. Discussed fall risk reduction methods. Follow up with PCP at least annually for continued monitoring and management.    [id number] Comprehensive

## 2025-03-14 NOTE — ASSESSMENT & PLAN NOTE
Chronic, improving following recent COVID-induced exacerbation. She has Breo Ellipta inhaler for daily use as well as albuterol inhaler PRN (1-2x daily presently though baseline once/month). She follows with pulmonology regularly and sees PCP tomorrow. Follow up as previously scheduled.

## 2025-03-17 ENCOUNTER — HOSPITAL ENCOUNTER (OUTPATIENT)
Dept: LAB | Facility: MEDICAL CENTER | Age: 80
End: 2025-03-17
Attending: INTERNAL MEDICINE
Payer: MEDICARE

## 2025-03-17 ENCOUNTER — RESULTS FOLLOW-UP (OUTPATIENT)
Dept: MEDICAL GROUP | Facility: PHYSICIAN GROUP | Age: 80
End: 2025-03-17
Payer: MEDICARE

## 2025-03-17 DIAGNOSIS — E78.5 DYSLIPIDEMIA: ICD-10-CM

## 2025-03-17 DIAGNOSIS — E03.9 HYPOTHYROIDISM, UNSPECIFIED TYPE: ICD-10-CM

## 2025-03-17 DIAGNOSIS — N18.31 STAGE 3A CHRONIC KIDNEY DISEASE: ICD-10-CM

## 2025-03-17 DIAGNOSIS — E11.9 TYPE 2 DIABETES MELLITUS WITHOUT COMPLICATION, WITHOUT LONG-TERM CURRENT USE OF INSULIN (HCC): ICD-10-CM

## 2025-03-17 LAB
25(OH)D3 SERPL-MCNC: 46 NG/ML (ref 30–100)
ALBUMIN SERPL BCP-MCNC: 4.1 G/DL (ref 3.2–4.9)
ALBUMIN/GLOB SERPL: 1.3 G/DL
ALP SERPL-CCNC: 78 U/L (ref 30–99)
ALT SERPL-CCNC: 21 U/L (ref 2–50)
ANION GAP SERPL CALC-SCNC: 13 MMOL/L (ref 7–16)
AST SERPL-CCNC: 26 U/L (ref 12–45)
BASOPHILS # BLD AUTO: 1.3 % (ref 0–1.8)
BASOPHILS # BLD: 0.08 K/UL (ref 0–0.12)
BILIRUB SERPL-MCNC: 0.6 MG/DL (ref 0.1–1.5)
BUN SERPL-MCNC: 22 MG/DL (ref 8–22)
CALCIUM ALBUM COR SERPL-MCNC: 9.3 MG/DL (ref 8.5–10.5)
CALCIUM SERPL-MCNC: 9.4 MG/DL (ref 8.5–10.5)
CHLORIDE SERPL-SCNC: 105 MMOL/L (ref 96–112)
CHOLEST SERPL-MCNC: 161 MG/DL (ref 100–199)
CO2 SERPL-SCNC: 23 MMOL/L (ref 20–33)
CREAT SERPL-MCNC: 0.98 MG/DL (ref 0.5–1.4)
EOSINOPHIL # BLD AUTO: 0.14 K/UL (ref 0–0.51)
EOSINOPHIL NFR BLD: 2.3 % (ref 0–6.9)
ERYTHROCYTE [DISTWIDTH] IN BLOOD BY AUTOMATED COUNT: 48.3 FL (ref 35.9–50)
EST. AVERAGE GLUCOSE BLD GHB EST-MCNC: 154 MG/DL
GFR SERPLBLD CREATININE-BSD FMLA CKD-EPI: 59 ML/MIN/1.73 M 2
GLOBULIN SER CALC-MCNC: 3.1 G/DL (ref 1.9–3.5)
GLUCOSE SERPL-MCNC: 127 MG/DL (ref 65–99)
HBA1C MFR BLD: 7 % (ref 4–5.6)
HCT VFR BLD AUTO: 44.3 % (ref 37–47)
HDLC SERPL-MCNC: 33 MG/DL
HGB BLD-MCNC: 14 G/DL (ref 12–16)
IMM GRANULOCYTES # BLD AUTO: 0.03 K/UL (ref 0–0.11)
IMM GRANULOCYTES NFR BLD AUTO: 0.5 % (ref 0–0.9)
LDLC SERPL CALC-MCNC: 101 MG/DL
LYMPHOCYTES # BLD AUTO: 1.48 K/UL (ref 1–4.8)
LYMPHOCYTES NFR BLD: 24.2 % (ref 22–41)
MCH RBC QN AUTO: 29.2 PG (ref 27–33)
MCHC RBC AUTO-ENTMCNC: 31.6 G/DL (ref 32.2–35.5)
MCV RBC AUTO: 92.3 FL (ref 81.4–97.8)
MONOCYTES # BLD AUTO: 0.46 K/UL (ref 0–0.85)
MONOCYTES NFR BLD AUTO: 7.5 % (ref 0–13.4)
NEUTROPHILS # BLD AUTO: 3.92 K/UL (ref 1.82–7.42)
NEUTROPHILS NFR BLD: 64.2 % (ref 44–72)
NRBC # BLD AUTO: 0 K/UL
NRBC BLD-RTO: 0 /100 WBC (ref 0–0.2)
PLATELET # BLD AUTO: 290 K/UL (ref 164–446)
PMV BLD AUTO: 10.7 FL (ref 9–12.9)
POTASSIUM SERPL-SCNC: 4.6 MMOL/L (ref 3.6–5.5)
PROT SERPL-MCNC: 7.2 G/DL (ref 6–8.2)
RBC # BLD AUTO: 4.8 M/UL (ref 4.2–5.4)
SODIUM SERPL-SCNC: 141 MMOL/L (ref 135–145)
T4 FREE SERPL-MCNC: 1.47 NG/DL (ref 0.93–1.7)
TRIGL SERPL-MCNC: 137 MG/DL (ref 0–149)
TSH SERPL-ACNC: 2.89 UIU/ML (ref 0.35–5.5)
VIT B12 SERPL-MCNC: 1340 PG/ML (ref 211–911)
WBC # BLD AUTO: 6.1 K/UL (ref 4.8–10.8)

## 2025-03-17 PROCEDURE — 80053 COMPREHEN METABOLIC PANEL: CPT

## 2025-03-17 PROCEDURE — 82306 VITAMIN D 25 HYDROXY: CPT

## 2025-03-17 PROCEDURE — 80061 LIPID PANEL: CPT

## 2025-03-17 PROCEDURE — 82607 VITAMIN B-12: CPT

## 2025-03-17 PROCEDURE — 36415 COLL VENOUS BLD VENIPUNCTURE: CPT

## 2025-03-17 PROCEDURE — 84443 ASSAY THYROID STIM HORMONE: CPT

## 2025-03-17 PROCEDURE — 83036 HEMOGLOBIN GLYCOSYLATED A1C: CPT

## 2025-03-17 PROCEDURE — 85025 COMPLETE CBC W/AUTO DIFF WBC: CPT

## 2025-03-17 PROCEDURE — 84439 ASSAY OF FREE THYROXINE: CPT

## 2025-03-18 ENCOUNTER — HOSPITAL ENCOUNTER (OUTPATIENT)
Facility: MEDICAL CENTER | Age: 80
End: 2025-03-18
Attending: PHYSICIAN ASSISTANT
Payer: MEDICARE

## 2025-03-18 ENCOUNTER — OFFICE VISIT (OUTPATIENT)
Dept: FAMILY PLANNING/WOMEN'S HEALTH CLINIC | Facility: PHYSICIAN GROUP | Age: 80
End: 2025-03-18
Payer: MEDICARE

## 2025-03-18 VITALS
HEART RATE: 78 BPM | RESPIRATION RATE: 14 BRPM | OXYGEN SATURATION: 94 % | WEIGHT: 197 LBS | HEIGHT: 64 IN | DIASTOLIC BLOOD PRESSURE: 62 MMHG | SYSTOLIC BLOOD PRESSURE: 116 MMHG | BODY MASS INDEX: 33.63 KG/M2

## 2025-03-18 DIAGNOSIS — E78.5 DYSLIPIDEMIA: ICD-10-CM

## 2025-03-18 DIAGNOSIS — J45.50 SEVERE PERSISTENT ASTHMA WITHOUT COMPLICATION: ICD-10-CM

## 2025-03-18 DIAGNOSIS — F32.1 CURRENT MODERATE EPISODE OF MAJOR DEPRESSIVE DISORDER WITHOUT PRIOR EPISODE (HCC): ICD-10-CM

## 2025-03-18 DIAGNOSIS — E03.9 HYPOTHYROIDISM, UNSPECIFIED TYPE: ICD-10-CM

## 2025-03-18 DIAGNOSIS — M81.0 AGE-RELATED OSTEOPOROSIS WITHOUT CURRENT PATHOLOGICAL FRACTURE: ICD-10-CM

## 2025-03-18 DIAGNOSIS — E11.9 TYPE 2 DIABETES MELLITUS WITHOUT COMPLICATION, WITHOUT LONG-TERM CURRENT USE OF INSULIN (HCC): ICD-10-CM

## 2025-03-18 DIAGNOSIS — R26.81 UNSTEADY GAIT: ICD-10-CM

## 2025-03-18 DIAGNOSIS — J44.9 CHRONIC OBSTRUCTIVE PULMONARY DISEASE, UNSPECIFIED COPD TYPE (HCC): ICD-10-CM

## 2025-03-18 PROCEDURE — 82570 ASSAY OF URINE CREATININE: CPT

## 2025-03-18 PROCEDURE — 82043 UR ALBUMIN QUANTITATIVE: CPT

## 2025-03-18 PROCEDURE — 3074F SYST BP LT 130 MM HG: CPT | Performed by: PHYSICIAN ASSISTANT

## 2025-03-18 PROCEDURE — G0439 PPPS, SUBSEQ VISIT: HCPCS | Performed by: PHYSICIAN ASSISTANT

## 2025-03-18 PROCEDURE — 1126F AMNT PAIN NOTED NONE PRSNT: CPT | Performed by: PHYSICIAN ASSISTANT

## 2025-03-18 PROCEDURE — 3078F DIAST BP <80 MM HG: CPT | Performed by: PHYSICIAN ASSISTANT

## 2025-03-18 SDOH — ECONOMIC STABILITY: FOOD INSECURITY: WITHIN THE PAST 12 MONTHS, THE FOOD YOU BOUGHT JUST DIDN'T LAST AND YOU DIDN'T HAVE MONEY TO GET MORE.: NEVER TRUE

## 2025-03-18 SDOH — ECONOMIC STABILITY: FOOD INSECURITY: HOW HARD IS IT FOR YOU TO PAY FOR THE VERY BASICS LIKE FOOD, HOUSING, MEDICAL CARE, AND HEATING?: NOT HARD AT ALL

## 2025-03-18 SDOH — ECONOMIC STABILITY: TRANSPORTATION INSECURITY: IN THE PAST 12 MONTHS, HAS LACK OF TRANSPORTATION KEPT YOU FROM MEDICAL APPOINTMENTS OR FROM GETTING MEDICATIONS?: NO

## 2025-03-18 SDOH — ECONOMIC STABILITY: FOOD INSECURITY: WITHIN THE PAST 12 MONTHS, YOU WORRIED THAT YOUR FOOD WOULD RUN OUT BEFORE YOU GOT THE MONEY TO BUY MORE.: NEVER TRUE

## 2025-03-18 ASSESSMENT — PAIN SCALES - GENERAL: PAINLEVEL_OUTOF10: NO PAIN

## 2025-03-18 ASSESSMENT — FIBROSIS 4 INDEX: FIB4 SCORE: 1.55

## 2025-03-18 ASSESSMENT — PATIENT HEALTH QUESTIONNAIRE - PHQ9
CLINICAL INTERPRETATION OF PHQ2 SCORE: 5
SUM OF ALL RESPONSES TO PHQ QUESTIONS 1-9: 15
5. POOR APPETITE OR OVEREATING: 2 - MORE THAN HALF THE DAYS

## 2025-03-18 ASSESSMENT — ACTIVITIES OF DAILY LIVING (ADL)
BATHING_REQUIRES_ASSISTANCE: 0
LACK_OF_TRANSPORTATION: NO

## 2025-03-18 ASSESSMENT — ENCOUNTER SYMPTOMS: GENERAL WELL-BEING: FAIR

## 2025-03-18 NOTE — ASSESSMENT & PLAN NOTE
Pt reports unsteady gait requiring her to use cane and walker. She notes her stair chair lift is broken presently. Denies any hx of falls. She recalls a hx of PT which she felt was of limited benefit. Discussed fall risk reduction methods. [employer] benefit including balance exercises. Follow up with PCP.

## 2025-03-18 NOTE — PROGRESS NOTES
Comprehensive Health Assessment Program     Patrica Qiu is a 79 y.o. here for her comprehensive health assessment.    Patient Active Problem List    Diagnosis Date Noted    Unsteady gait     Intertrigo 05/14/2024    Current moderate episode of major depressive disorder without prior episode ([facility]) 02/13/2024    Type 2 diabetes mellitus without complication, without long-term current use of insulin ([facility]) 02/12/2024    Dermatitis 02/12/2024    Stage 2/3a chronic kidney disease 02/12/2024    Iron deficiency 05/17/2023    Hypothyroidism 05/17/2023    Dyslipidemia 11/10/2022    Chronic low back pain 11/10/2022    Osteoporosis without current pathological fracture 11/10/2022    Neuropathy 11/10/2022    Other constipation 09/21/2022    Chronic obstructive pulmonary disease ([facility]) 08/22/2022    Severe persistent asthma without complication 08/17/2022    Hx of smoking 07/13/2022       Current Outpatient Medications   Medication Sig Dispense Refill    benzonatate (TESSALON) 100 MG Cap Take 1 Capsule by mouth 3 times a day as needed for Cough. 60 Capsule 0    levothyroxine (SYNTHROID) 88 MCG Tab Take 1 Tablet by mouth every morning on an empty stomach. 100 Tablet 3    dapagliflozin propanediol (FARXIGA) 10 MG Tab Take 1 Tablet by mouth every day. 100 Tablet 3    fluticasone furoate-vilanterol (BREO) 200-25 MCG/ACT AEROSOL POWDER, BREATH ACTIVATED Inhale 1 Puff every day. 180 Each 3    albuterol 108 (90 Base) MCG/ACT Aero Soln inhalation aerosol Inhale 2 Puffs every 6 hours as needed for Shortness of Breath. 54 g 3    Ascorbic Acid (VITAMIN C) 1000 MG Tab Take 1 Tablet by mouth.      multivitamin (THERAGRAN) Tab Take 1 Tablet by mouth every day.      vitamin D3 (CHOLECALCIFEROL) 1000 Unit (25 mcg) Tab Take 1 Tablet by mouth every day.      calcium citrate (CALCITRATE) 950 (200 Ca) MG Tab Take 1 Tablet by mouth every day.      loratadine (CLARITIN) 10 MG Tab Take 1 Tablet by mouth every day.      nystatin  (MYCOSTATIN) powder Apply 1 g topically 3 times a day. 60 g 2    magnesium gluconate (MAG-G) 500 MG tablet Take 1,000 mg by mouth every day.      cyclobenzaprine (FLEXERIL) 5 mg tablet Take 1-2 Tablets by mouth every evening. (Patient not taking: Reported on 3/18/2025) 200 Tablet 3    DIGESTIVE AIDS MIXTURE PO Take  by mouth.      vitamin e (VITAMIN E) 400 UNIT Cap Take 1 Capsule by mouth every day.       No current facility-administered medications for this visit.          Current supplements as per medication list.     Allergies:   Patient has no known allergies.  Social History     Tobacco Use    Smoking status: Former     Current packs/day: 0.00     Average packs/day: 0.5 packs/day for 42.0 years (21.0 ttl pk-yrs)     Types: Cigarettes     Start date: 1980     Quit date: 2022     Years since quittin.7    Smokeless tobacco: Never   Vaping Use    Vaping status: Former    Passive vaping exposure: Yes   Substance Use Topics    Alcohol use: Not Currently     Comment: not for 5 years    Drug use: Not Currently     Types: Oral, Marijuana     Comment: CBD GUMMIES OCC.     Family History   Problem Relation Age of Onset    Heart Disease Mother     Heart Disease Father     Cancer Father     Asthma Father     Dementia Father     Heart Attack Brother     Heart Attack Brother     Parkinson's Disease Brother     Asthma Brother      Patrica  has a past medical history of Acute respiratory failure with hypoxia, asthma exacerbation (HCC) (2022), Anxiety (2022), Asthma, Asthma exacerbation attacks (2022), BMI 32.0-32.9,adult (11/10/2022), Chest tightness, Chronic obstructive pulmonary disease (HCC), Chronic respiratory failure (HCC) (11/10/2022), Shortness of breath, and Wheezing.    She has no past medical history of Cough, Difficulty breathing, Fainting, Painful breathing, Palpitations, Sputum production, or Swelling of lower extremity.   Past Surgical History:   Procedure Laterality Date     DILATION AND CURETTAGE  1990    TUBAL LIGATION  1969       Screening:  In the last six months have you experienced any leakage of urine? No    Depression Screening  Little interest or pleasure in doing things?  3 - nearly every day  Feeling down, depressed , or hopeless? 2 - more than half the days  Trouble falling or staying asleep, or sleeping too much?  2 - more than half the days  Feeling tired or having little energy?  3 - nearly every day  Poor appetite or overeating?  2 - more than half the days  Feeling bad about yourself - or that you are a failure or have let yourself or your family down? 1 - several days  Trouble concentrating on things, such as reading the newspaper or watching television? 1 - several days  Moving or speaking so slowly that other people could have noticed.  Or the opposite - being so fidgety or restless that you have been moving around a lot more than usual?  1 - several days  Thoughts that you would be better off dead, or of hurting yourself?  0 - not at all  Patient Health Questionnaire Score: 15    If depressive symptoms identified deferred to follow up visit unless specifically addressed in assessment and plan.    Interpretation of PHQ-9 Total Score   Score Severity   1-4 No Depression   5-9 Mild Depression   10-14 Moderate Depression   15-19 Moderately Severe Depression   20-27 Severe Depression    Screening for Cognitive Impairment  Do you or any of your friends or family members have any concern about your memory? No  Three Minute Recall (Village, Kitchen, Baby) 2/3    Connor clock face with all 12 numbers and set the hands to show 10 minutes past 11.  Yes 5  Cognitive concerns identified deferred for follow up unless specifically addressed in assessment and plan.    Fall Risk Assessment  Has the patient had two or more falls in the last year or any fall with injury in the last year?  No    Safety Assessment  Do you always wear your seatbelt?  Yes  Any changes to home needed to  function safely? No  Difficulty hearing.  Yes  Patient counseled about all safety risks that were identified.    Functional Assessment ADLs  Are there any barriers preventing you from cooking for yourself or meeting nutritional needs?  Yes. Lack of energy, pt is still able to complete this task   Are there any barriers preventing you from driving safely or obtaining transportation?  No.    Are there any barriers preventing you from using a telephone or calling for help?  No    Are there any barriers preventing you from shopping?  Yes. Lack of energy, pt is still able to complete this task     Are there any barriers preventing you from taking care of your own finances?  No    Are there any barriers preventing you from managing your medications?  No    Are there any barriers preventing you from showering, bathing or dressing yourself? No    Are there any barriers preventing you from doing housework or laundry? Yes Lack of energy, pt is still able to complete this task     Are there any barriers preventing you from using the toilet?No    Are you currently engaging in any exercise or physical activity?  No.      Self-Assessment of Health  What is your perception of your health? Fair    Do you sleep more than six hours a night? No Sleeps about 2-4 hours  In the past 7 days, how much did pain keep you from doing your normal work? Some    Do you spend quality time with family or friends (virtually or in person)? No    Do you usually eat a heart healthy diet that constists of a variety of fruits, vegetables, whole grains and fiber? No    Do you eat foods high in fat and/or Fast Food more than three times per week? No    How concerned are you that your medical conditions are not being well managed? Not at all    Are you worried that in the next 2 months, you may not have stable housing that you own, rent, or stay in as part of a household? No        Advance Care Planning  Do you have an Advance Directive, Living Will,  Durable Power of , or POLST? Yes    Living Will     is on file      Health Maintenance Summary            Current Care Gaps       Annual Pulmonary Function Test / Spirometry (Yearly) Overdue since 9/13/2024 09/13/2023  PFT DICTATED RESULTS    11/17/2021  PULMONARY FUNCTION TESTS -Test requested: Complete Pulmonary Function Test    03/13/2020  PULMONARY FUNCTION TESTS -Test requested: Complete Pulmonary Function Test              Lung Cancer Screening (Yearly) Overdue since 11/29/2024 11/30/2023  CT-LUNG CANCER-SCREENING    07/15/2022  CT-CHEST (THORAX) W/O              Diabetes: Retinopathy Screening (Yearly) Due soon on 4/18/2025 04/18/2024  POCT Retinal Eye Exam              Diabetes: Monofilament / LE Exam (Yearly) Due soon on 4/18/2025 04/18/2024  [employer]: WORKFLOW - DIABETES - DIABETIC FOOT EXAM PERFORMED    04/18/2024  Diabetic Monofilament LE Exam                      Needs Review       A1c Screening (Every 6 Months) Tentatively due on 9/17/2025 03/17/2025  HEMOGLOBIN A1C    11/20/2024  POCT A1C    08/19/2024  POCT  A1C    05/14/2024  HEMOGLOBIN A1C    05/13/2024  HEMOGLOBIN A1C     Only the first 5 history entries have been loaded, but more history exists.            Fasting Lipid Profile (Yearly) Tentatively due on 3/17/2026      03/17/2025  Lipid Profile    11/19/2024  Lipid Profile    05/13/2024  Lipid Profile    02/08/2024  Lipid Profile    08/17/2023  Lipid Profile      Only the first 5 history entries have been loaded, but more history exists.                      Awaiting Completion       Diabetes: Urine Protein Screening (Yearly) Order placed this encounter      03/18/2025  Order placed for MICROALBUMIN CREAT RATIO URINE by Lisbeth Woods P.A.-C.    02/08/2024  MICROALBUMIN CREAT RATIO URINE                      Upcoming       Zoster (Shingles) Vaccines (1 of 2) Postponed until 4/20/2025      No completion history exists for this topic.              Mammogram  (Yearly) Next due on 11/18/2025 11/18/2024  MA-SCREENING MAMMO BILAT W/TOMOSYNTHESIS W/CAD    11/17/2023  MA-SCREENING MAMMO BILAT W/TOMOSYNTHESIS W/CAD    11/16/2022  MA-SCREENING MAMMO BILAT W/TOMOSYNTHESIS W/CAD    04/09/2007  MA-SCREENING DIGITAL MAMMO    04/09/2007  MA-CAD SCREENING-MAMMO     Only the first 5 history entries have been loaded, but more history exists.            SERUM CREATININE (Yearly) Next due on 3/17/2026      03/17/2025  Comp Metabolic Panel    11/19/2024  Comp Metabolic Panel    05/13/2024  Comp Metabolic Panel    02/08/2024  Comp Metabolic Panel    08/17/2023  Comp Metabolic Panel      Only the first 5 history entries have been loaded, but more history exists.              Annual Wellness Visit (Yearly) Next due on 3/18/2026      03/18/2025  Level of Service: [place] ANNUAL WELLNESS VISIT-INCLUDES PPPS SUBSEQUE*    02/13/2024  Level of Service: [place] ANNUAL WELLNESS VISIT-INCLUDES PPPS SUBSEQUE*    07/13/2023  Level of Service: [place] ANNUAL WELLNESS VISIT-INCLUDES PPPS SUBSEQUE*              Bone Density Scan (Every 2 Years) Next due on 11/18/2026 11/18/2024  DS-BONE DENSITY STUDY (DEXA)    10/26/2022  DS-BONE DENSITY STUDY (DEXA)    04/09/2007  DS-BONE DENSITY STUDY (DEXA)              IMM DTaP/Tdap/Td Vaccine (2 - Td or Tdap) Next due on 8/3/2032      08/03/2022  Imm Admin: Tdap Vaccine                      Completed or No Longer Recommended       Influenza Vaccine (Series Information) Completed      10/21/2024  Imm Admin: Influenza high-dose trivalent (PF)    11/09/2023  Imm Admin: Influenza Vaccine Adult HD    09/21/2022  Imm Admin: Influenza Vaccine Adult HD    12/16/2019  Imm Admin: Influenza Vaccine Adult HD              Hepatitis C Screening  Completed      08/17/2023  Hepatitis C Antibody component of HEP C VIRUS ANTIBODY              Lung Cancer Screening Shared Decision Making  Completed      11/27/2023  Registry Metric: Lung Cancer Shared Decision Making Conversation Date               Pneumococcal Vaccine: 50+ Years (Series Information) Completed      08/03/2022  Imm Admin: Pneumococcal Conjugate Vaccine (PCV20)    12/16/2019  Imm Admin: Pneumococcal Conjugate Vaccine (Prevnar/PCV-13)              Hepatitis A Vaccine (Hep A) (Series Information) Aged Out      No completion history exists for this topic.              Hepatitis B Vaccine (Hep B) (Series Information) Aged Out     No completion history exists for this topic.              HPV Vaccines (Series Information) Aged Out     No completion history exists for this topic.              Polio Vaccine (Inactivated Polio) (Series Information) Aged Out     No completion history exists for this topic.              Meningococcal Immunization (Series Information) Aged Out     No completion history exists for this topic.              Colorectal Cancer Screening  Discontinued        Frequency changed to Never automatically (Topic No Longer Applies)    09/17/2018  COLONOSCOPY RESULTS              COVID-19 Vaccine  Discontinued      06/21/2022  Imm Admin: MODERNA SARS-COV-2 VACCINE (12+)    11/20/2021  Imm Admin: MODERNA SARS-COV-2 VACCINE (12+)    03/04/2021  Imm Admin: Elvia SARS-CoV-2 Vaccine                            Patient Care Team:  Jackelyn Trujillo D.O. as PCP - General (Internal Medicine)  Jackelyn Trujillo D.O. as PCP - H Paneled (Internal Medicine)  preferred home care as Respiratory Therapist    Financial Resource Strain: Low Risk  (3/18/2025)    Overall Financial Resource Strain (CARDIA)     Difficulty of Paying Living Expenses: Not hard at all      Transportation Needs: No Transportation Needs (3/18/2025)    PRAPARE - Transportation     Lack of Transportation (Medical): No     Lack of Transportation (Non-Medical): No      Food Insecurity: No Food Insecurity (3/18/2025)    Hunger Vital Sign     Worried About Running Out of Food in the Last Year: Never true     Ran Out of Food in the Last Year: Never true        Encounter  "Vitals  Blood Pressure : 116/62  Pulse: 78  Respiration: 14  Pulse Oximetry: 94 %  Weight: 89.4 kg (197 lb)  Height: 162.6 cm (5' 4\") (pt reported)  BMI (Calculated): 33.81  Pain Score: No pain     Physical Exam:  Constitutional: NAD  HENMT: NC/AT, EOMI  Cardiovascular: RRR, No m/r/g  Lungs: CTAB, no w/r/r  Extremities: No c/c/e  Skin: No lesions notes  Neurologic: Alert & oriented x3, CN II-XII grossly intact    Assessment and Plan. The following treatment and monitoring plan is recommended:  Chronic obstructive pulmonary disease (HCC)  Chronic, ongoing. Prior history of smoking. Currently maintains on Breo Ellipta daily and albuterol inhaler PRN, presently daily following COVID infx. Follow up with pulmonology per routine    Current moderate episode of major depressive disorder without prior episode (HCC)  Chronic, ongoing. PHQ today 15/27, denying SI. She endorses loneliness and is interested in meeting people. She saw  once last year which she didn't find helpful. Discussed social clubs, art clubs and senior [employer] centers available in [place]. Follow up with PCP for continued monitoring and management.     Dyslipidemia  Chronic, stable. Most recent lipid panel from 11/2024 with LDL WNL. Pt maintains on RYRE.  Recommend Mediterranean diet and regular physical activity. Follow up with PCP at least annually for continued monitoring and management.             Lab Results   Component Value Date/Time     CHOLSTRLTOT 163 11/19/2024 07:19 AM     LDL 85 11/19/2024 07:19 AM     HDL 34 (A) 11/19/2024 07:19 AM     TRIGLYCERIDE 221 (H) 11/19/2024 07:19 AM       Hypothyroidism  Chronic, ongoing. Pt maintains on levothyroxine 88mcg daily with TSH wnl as of 11/2024. Continue current treatment regime. Follow up with PCP at least annually for continued monitoring and management.  .    Latest Reference Range & Units 11/19/24 07:19   TSH 0.350 - 5.500 uIU/mL 3.220       Osteoporosis without current pathological fracture  Chronic, " ongoing. Last DEXA  with osteopenia of the lumbar spine with T score of -1.9. Denies hx of fragility fracture. Advise calcium and vitamin D supplementation with weightbearing exercises as tolerated. She reports less exercise following the passing of her  a couple yeas ago. Discussed fall risk reduction methods. Follow up with PCP at least annually for continued monitoring and management.     Severe persistent asthma without complication  Chronic, improving following recent COVID-induced exacerbation. She has Breo Ellipta inhaler for daily use as well as albuterol inhaler PRN (1-2x daily presently though baseline once/month). She follows with pulmonology regularly and sees PCP tomorrow. Follow up as previously scheduled.     Type 2 diabetes mellitus without complication, without long-term current use of insulin (HCC)  Chronic, stable. Last A1c 7.0 as of 3/2025. Last monofilament foot exam: 2024, last urine microalb/creat:2024 , last retinal screenin2024. Maintains on Farxiga 10mg daily. Continue to advise low carbohydrate diet with regular physical activity. Continue current treatment regime. Follow up with PCP as scheduled for continued monitoring and management.     Unsteady gait  Pt reports unsteady gait requiring her to use cane and walker. She notes her stair chair lift is broken presently. Denies any hx of falls. She recalls a hx of PT which she felt was of limited benefit. Discussed fall risk reduction methods. [employer] benefit including balance exercises. Follow up with PCP.    Services suggested: No services needed at this time  Health Care Screening: Age-appropriate preventive services recommended by USPTF and ACIP covered by Medicare were discussed today. Services ordered if indicated and agreed upon by the patient.  Referrals offered: Community-based lifestyle interventions to reduce health risks and promote self-management and wellness, fall prevention, nutrition, physical activity,  tobacco-use cessation, weight loss, and mental health services as per orders if indicated.    Discussion today about general wellness and lifestyle habits:    Prevent falls and reduce trip hazards; Cautioned about securing or removing rugs.  Have a working fire alarm and carbon monoxide detector.  Engage in regular physical activity and social activities.    Follow-up: No follow-ups on file.

## 2025-03-19 ENCOUNTER — APPOINTMENT (OUTPATIENT)
Dept: MEDICAL GROUP | Facility: PHYSICIAN GROUP | Age: 80
End: 2025-03-19
Payer: MEDICARE

## 2025-03-19 ENCOUNTER — HOSPITAL ENCOUNTER (OUTPATIENT)
Facility: MEDICAL CENTER | Age: 80
End: 2025-03-19
Attending: INTERNAL MEDICINE
Payer: MEDICARE

## 2025-03-19 ENCOUNTER — RESULTS FOLLOW-UP (OUTPATIENT)
Dept: MEDICAL GROUP | Facility: PHYSICIAN GROUP | Age: 80
End: 2025-03-19

## 2025-03-19 VITALS
TEMPERATURE: 98.2 F | DIASTOLIC BLOOD PRESSURE: 56 MMHG | RESPIRATION RATE: 20 BRPM | WEIGHT: 196.5 LBS | SYSTOLIC BLOOD PRESSURE: 118 MMHG | HEART RATE: 84 BPM | BODY MASS INDEX: 33.55 KG/M2 | HEIGHT: 64 IN | OXYGEN SATURATION: 96 %

## 2025-03-19 DIAGNOSIS — G93.31 POSTVIRAL FATIGUE SYNDROME: ICD-10-CM

## 2025-03-19 DIAGNOSIS — E11.9 TYPE 2 DIABETES MELLITUS WITHOUT COMPLICATION, WITHOUT LONG-TERM CURRENT USE OF INSULIN (HCC): ICD-10-CM

## 2025-03-19 DIAGNOSIS — K59.01 SLOW TRANSIT CONSTIPATION: ICD-10-CM

## 2025-03-19 DIAGNOSIS — E03.9 HYPOTHYROIDISM, UNSPECIFIED TYPE: ICD-10-CM

## 2025-03-19 DIAGNOSIS — J44.89 ASTHMA-COPD OVERLAP SYNDROME (HCC): ICD-10-CM

## 2025-03-19 DIAGNOSIS — J45.40 MODERATE PERSISTENT ASTHMA WITHOUT COMPLICATION: ICD-10-CM

## 2025-03-19 DIAGNOSIS — N18.31 CKD STAGE 3A, GFR 45-59 ML/MIN: ICD-10-CM

## 2025-03-19 DIAGNOSIS — E78.5 DYSLIPIDEMIA: ICD-10-CM

## 2025-03-19 LAB
CREAT UR-MCNC: 135 MG/DL
CREAT UR-MCNC: 93 MG/DL
MICROALBUMIN UR-MCNC: 4.5 MG/DL
MICROALBUMIN UR-MCNC: <1.2 MG/DL
MICROALBUMIN/CREAT UR: 33 MG/G (ref 0–30)
MICROALBUMIN/CREAT UR: NORMAL MG/G (ref 0–30)

## 2025-03-19 PROCEDURE — 3074F SYST BP LT 130 MM HG: CPT | Performed by: INTERNAL MEDICINE

## 2025-03-19 PROCEDURE — 99214 OFFICE O/P EST MOD 30 MIN: CPT | Performed by: INTERNAL MEDICINE

## 2025-03-19 PROCEDURE — 82043 UR ALBUMIN QUANTITATIVE: CPT

## 2025-03-19 PROCEDURE — 82570 ASSAY OF URINE CREATININE: CPT

## 2025-03-19 PROCEDURE — 3078F DIAST BP <80 MM HG: CPT | Performed by: INTERNAL MEDICINE

## 2025-03-19 ASSESSMENT — FIBROSIS 4 INDEX: FIB4 SCORE: 1.55
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
## 2025-03-21 ENCOUNTER — RESULTS FOLLOW-UP (OUTPATIENT)
Dept: FAMILY PLANNING/WOMEN'S HEALTH CLINIC | Facility: PHYSICIAN GROUP | Age: 80
End: 2025-03-21

## 2025-03-26 DIAGNOSIS — J45.40 MODERATE PERSISTENT ASTHMA WITHOUT COMPLICATION: ICD-10-CM

## 2025-03-26 PROCEDURE — RXMED WILLOW AMBULATORY MEDICATION CHARGE: Performed by: INTERNAL MEDICINE

## 2025-03-26 RX ORDER — FLUTICASONE FUROATE AND VILANTEROL 200; 25 UG/1; UG/1
1 POWDER RESPIRATORY (INHALATION) DAILY
Qty: 180 EACH | Refills: 0 | Status: CANCELLED | OUTPATIENT
Start: 2025-03-19

## 2025-03-26 RX ORDER — FLUTICASONE FUROATE AND VILANTEROL 200; 25 UG/1; UG/1
1 POWDER RESPIRATORY (INHALATION) DAILY
Qty: 180 EACH | Refills: 3 | Status: SHIPPED | OUTPATIENT
Start: 2025-03-26

## 2025-03-26 NOTE — TELEPHONE ENCOUNTER
Received request via: Pharmacy    Was the patient seen in the last year in this department? Yes    Does the patient have an active prescription (recently filled or refills available) for medication(s) requested? No    Pharmacy Name: [facility]     Does the patient have detention Plus and need 100-day supply? (This applies to ALL medications) Yes, quantity updated to 100 days

## 2025-03-28 ENCOUNTER — PHARMACY VISIT (OUTPATIENT)
Dept: PHARMACY | Facility: MEDICAL CENTER | Age: 80
End: 2025-03-28
Payer: COMMERCIAL

## 2025-03-31 PROCEDURE — RXMED WILLOW AMBULATORY MEDICATION CHARGE: Performed by: INTERNAL MEDICINE

## 2025-04-02 ENCOUNTER — PHARMACY VISIT (OUTPATIENT)
Dept: PHARMACY | Facility: MEDICAL CENTER | Age: 80
End: 2025-04-02
Payer: COMMERCIAL

## 2025-04-14 ENCOUNTER — RESULTS FOLLOW-UP (OUTPATIENT)
Dept: MEDICAL GROUP | Facility: PHYSICIAN GROUP | Age: 80
End: 2025-04-14

## 2025-04-29 ENCOUNTER — HOSPITAL ENCOUNTER (OUTPATIENT)
Dept: PULMONOLOGY | Facility: MEDICAL CENTER | Age: 80
End: 2025-04-29
Attending: INTERNAL MEDICINE
Payer: MEDICARE

## 2025-04-29 DIAGNOSIS — J45.40 MODERATE PERSISTENT ASTHMA WITHOUT COMPLICATION: ICD-10-CM

## 2025-04-29 PROCEDURE — 94726 PLETHYSMOGRAPHY LUNG VOLUMES: CPT

## 2025-04-29 PROCEDURE — 94729 DIFFUSING CAPACITY: CPT

## 2025-04-29 PROCEDURE — 94060 EVALUATION OF WHEEZING: CPT

## 2025-04-29 RX ORDER — ALBUTEROL SULFATE 5 MG/ML
2.5 SOLUTION RESPIRATORY (INHALATION)
Status: DISCONTINUED | OUTPATIENT
Start: 2025-04-29 | End: 2025-04-30 | Stop reason: HOSPADM

## 2025-04-29 RX ADMIN — ALBUTEROL SULFATE 2.5 MG: 5 SOLUTION RESPIRATORY (INHALATION) at 09:32

## 2025-05-01 ENCOUNTER — RESULTS FOLLOW-UP (OUTPATIENT)
Dept: MEDICAL GROUP | Facility: PHYSICIAN GROUP | Age: 80
End: 2025-05-01

## 2025-05-01 NOTE — PROCEDURES
DATE OF SERVICE:  04/29/2025     PULMONARY FUNCTION TEST INTERPRETATION     REFERRING PROVIDER:  Jackelyn Trujillo DO       REASON FOR REFERRAL:  Moderate persistent asthma.     INTERPRETATION:  1.  FVC 2.31 L, Z-score -0.37.  2.  FEV1 1.67 L, Z-score -0.68.  3.  FEV1/FVC 72%, Z-score -0.62.  4.  TLC 6.05 L, Z-score of 2.13.  5.  RV 3.64 L, Z-score of 3.46.  6.  DLCO 18.87 mL/min/mmHg, Z-score 0.30.     Spirometry is normal and shows no airflow obstruction.  No bronchodilator   response is observed, which does not preclude a clinical response to   bronchodilator therapy.  Lung volumes demonstrate mild hyperinflation and   moderate air trapping.  Gas exchange is normal.     CONCLUSION:  Mild hyperinflation and moderate air trapping can be seen in   suboptimally controlled asthma.  Correlate clinically.        ______________________________  Brice Ramos MD    ACD/MARAH    DD:  04/30/2025 18:52  DT:  04/30/2025 21:22    Job#:  [id number]

## 2025-05-08 DIAGNOSIS — J44.89 ASTHMA-COPD OVERLAP SYNDROME (HCC): ICD-10-CM

## 2025-05-08 RX ORDER — ALBUTEROL SULFATE 90 UG/1
2 INHALANT RESPIRATORY (INHALATION) EVERY 6 HOURS PRN
Qty: 54 G | Refills: 3 | Status: CANCELLED | OUTPATIENT
Start: 2025-05-08

## 2025-05-08 RX ORDER — ALBUTEROL SULFATE 90 UG/1
2 INHALANT RESPIRATORY (INHALATION) EVERY 6 HOURS PRN
Qty: 54 G | Refills: 3 | Status: SHIPPED | OUTPATIENT
Start: 2025-05-08 | End: 2025-05-11 | Stop reason: SDUPTHER

## 2025-05-08 RX ORDER — ALBUTEROL SULFATE 90 UG/1
2 INHALANT RESPIRATORY (INHALATION) EVERY 6 HOURS PRN
Qty: 54 G | Refills: 3 | Status: SHIPPED | OUTPATIENT
Start: 2025-05-08 | End: 2025-05-08 | Stop reason: SDUPTHER

## 2025-05-11 DIAGNOSIS — J44.89 ASTHMA-COPD OVERLAP SYNDROME (HCC): ICD-10-CM

## 2025-05-12 RX ORDER — ALBUTEROL SULFATE 90 UG/1
2 INHALANT RESPIRATORY (INHALATION) EVERY 6 HOURS PRN
Qty: 54 G | Refills: 3 | Status: SHIPPED | OUTPATIENT
Start: 2025-05-12

## 2025-06-16 ENCOUNTER — OFFICE VISIT (OUTPATIENT)
Dept: MEDICAL GROUP | Facility: PHYSICIAN GROUP | Age: 80
End: 2025-06-16
Payer: MEDICARE

## 2025-06-16 ENCOUNTER — RESULTS FOLLOW-UP (OUTPATIENT)
Dept: MEDICAL GROUP | Facility: PHYSICIAN GROUP | Age: 80
End: 2025-06-16

## 2025-06-16 ENCOUNTER — HOSPITAL ENCOUNTER (OUTPATIENT)
Dept: LAB | Facility: MEDICAL CENTER | Age: 80
End: 2025-06-16
Attending: INTERNAL MEDICINE
Payer: MEDICARE

## 2025-06-16 VITALS
DIASTOLIC BLOOD PRESSURE: 76 MMHG | TEMPERATURE: 97.6 F | HEIGHT: 64 IN | HEART RATE: 75 BPM | WEIGHT: 196.8 LBS | BODY MASS INDEX: 33.6 KG/M2 | OXYGEN SATURATION: 94 % | SYSTOLIC BLOOD PRESSURE: 126 MMHG

## 2025-06-16 DIAGNOSIS — E03.9 HYPOTHYROIDISM, UNSPECIFIED TYPE: ICD-10-CM

## 2025-06-16 DIAGNOSIS — N18.31 CKD STAGE 3A, GFR 45-59 ML/MIN: ICD-10-CM

## 2025-06-16 DIAGNOSIS — M54.50 CHRONIC LOW BACK PAIN, UNSPECIFIED BACK PAIN LATERALITY, UNSPECIFIED WHETHER SCIATICA PRESENT: ICD-10-CM

## 2025-06-16 DIAGNOSIS — E78.5 DYSLIPIDEMIA: ICD-10-CM

## 2025-06-16 DIAGNOSIS — J44.89 ASTHMA-COPD OVERLAP SYNDROME (HCC): ICD-10-CM

## 2025-06-16 DIAGNOSIS — G89.29 CHRONIC LOW BACK PAIN, UNSPECIFIED BACK PAIN LATERALITY, UNSPECIFIED WHETHER SCIATICA PRESENT: ICD-10-CM

## 2025-06-16 DIAGNOSIS — J30.2 SEASONAL ALLERGIES: ICD-10-CM

## 2025-06-16 DIAGNOSIS — E11.9 TYPE 2 DIABETES MELLITUS WITHOUT COMPLICATION, WITHOUT LONG-TERM CURRENT USE OF INSULIN (HCC): ICD-10-CM

## 2025-06-16 DIAGNOSIS — E11.65 TYPE 2 DIABETES MELLITUS WITH HYPERGLYCEMIA, WITHOUT LONG-TERM CURRENT USE OF INSULIN (HCC): Primary | ICD-10-CM

## 2025-06-16 LAB
ALBUMIN SERPL BCP-MCNC: 4.3 G/DL (ref 3.2–4.9)
ALBUMIN/GLOB SERPL: 1.4 G/DL
ALP SERPL-CCNC: 77 U/L (ref 30–99)
ALT SERPL-CCNC: 29 U/L (ref 2–50)
ANION GAP SERPL CALC-SCNC: 12 MMOL/L (ref 7–16)
AST SERPL-CCNC: 29 U/L (ref 12–45)
BILIRUB SERPL-MCNC: 0.4 MG/DL (ref 0.1–1.5)
BUN SERPL-MCNC: 30 MG/DL (ref 8–22)
CALCIUM ALBUM COR SERPL-MCNC: 9.6 MG/DL (ref 8.5–10.5)
CALCIUM SERPL-MCNC: 9.8 MG/DL (ref 8.5–10.5)
CHLORIDE SERPL-SCNC: 105 MMOL/L (ref 96–112)
CHOLEST SERPL-MCNC: 169 MG/DL (ref 100–199)
CO2 SERPL-SCNC: 24 MMOL/L (ref 20–33)
CREAT SERPL-MCNC: 1.11 MG/DL (ref 0.5–1.4)
EST. AVERAGE GLUCOSE BLD GHB EST-MCNC: 134 MG/DL
FASTING STATUS PATIENT QL REPORTED: NORMAL
GFR SERPLBLD CREATININE-BSD FMLA CKD-EPI: 50 ML/MIN/1.73 M 2
GLOBULIN SER CALC-MCNC: 3 G/DL (ref 1.9–3.5)
GLUCOSE SERPL-MCNC: 118 MG/DL (ref 65–99)
HBA1C MFR BLD: 6.3 % (ref 4–5.6)
HDLC SERPL-MCNC: 39 MG/DL
LDLC SERPL CALC-MCNC: 102 MG/DL
POTASSIUM SERPL-SCNC: 5 MMOL/L (ref 3.6–5.5)
PROT SERPL-MCNC: 7.3 G/DL (ref 6–8.2)
SODIUM SERPL-SCNC: 141 MMOL/L (ref 135–145)
TRIGL SERPL-MCNC: 139 MG/DL (ref 0–149)
TSH SERPL DL<=0.005 MIU/L-ACNC: 0.59 UIU/ML (ref 0.38–5.33)

## 2025-06-16 PROCEDURE — 99214 OFFICE O/P EST MOD 30 MIN: CPT | Performed by: INTERNAL MEDICINE

## 2025-06-16 PROCEDURE — 80061 LIPID PANEL: CPT

## 2025-06-16 PROCEDURE — 83036 HEMOGLOBIN GLYCOSYLATED A1C: CPT

## 2025-06-16 PROCEDURE — 36415 COLL VENOUS BLD VENIPUNCTURE: CPT

## 2025-06-16 PROCEDURE — 80053 COMPREHEN METABOLIC PANEL: CPT

## 2025-06-16 PROCEDURE — 3078F DIAST BP <80 MM HG: CPT | Performed by: INTERNAL MEDICINE

## 2025-06-16 PROCEDURE — 3074F SYST BP LT 130 MM HG: CPT | Performed by: INTERNAL MEDICINE

## 2025-06-16 PROCEDURE — 84443 ASSAY THYROID STIM HORMONE: CPT

## 2025-06-16 RX ORDER — DAPAGLIFLOZIN 10 MG/1
10 TABLET, FILM COATED ORAL DAILY
Qty: 30 TABLET | Refills: 11 | Status: SHIPPED | OUTPATIENT
Start: 2025-06-16

## 2025-06-16 RX ORDER — FLUTICASONE FUROATE AND VILANTEROL 200; 25 UG/1; UG/1
1 POWDER RESPIRATORY (INHALATION) DAILY
Qty: 60 EACH | Refills: 11 | Status: SHIPPED | OUTPATIENT
Start: 2025-06-16

## 2025-06-16 ASSESSMENT — FIBROSIS 4 INDEX: FIB4 SCORE: 1.55

## 2025-06-16 NOTE — PROGRESS NOTES
Subjective:   Chief Complaint/History of Present Illness:  Patrica Qiu is a 79 y.o. female established patient who presents today to discuss medical problems as listed below. Patrica is unaccompanied for today's visit.    History of Present Illness  The patient presents for evaluation of diabetes, asthma, allergies, muscle cramps, and chronic kidney disease.    She reports a general feeling of boredom and has not been able to visit her property for over a year. 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 Currently, she experiences difficulty in descending stairs. Her daughter suspects that these symptoms may be related to thyroid issues, given her own diagnosis of Hashimoto's disease. She is on thyroid medication at 88 mcg.    She is currently on Farxiga 10 mg for blood glucose management and reports no adverse effects such as increased urination or infections. She has been making an effort to increase her fluid intake due to warmer weather conditions.    She uses Breo Ellipta as a daily maintenance inhaler and albuterol as needed. She finds that the combination of Breo Ellipta and albuterol provides effective relief. She has been using Claritin-D for the past two weeks to manage a cough, which has since resolved. She has also been taking Flexeril as needed for muscle spasms, approximately once every two to three months. She has noticed a decrease in muscle cramps since increasing her fluid intake and continues to take magnesium supplements daily.    She occasionally experiences kidney pain, which she manages with a massage device. She is considering purchasing a heating massager for additional relief.    She is uncertain about her weight status but expresses a desire to lose weight. She admits to a preference for salty foods and acknowledges that she should limit her salt intake.    FAMILY HISTORY  Her daughter has Hashimoto's disease.     Current  "Medications:  Current Medications and Prescriptions Ordered in Epic[1]       Objective:   Physical Exam:    Vitals: /76 (BP Location: Left arm, Patient Position: Sitting, BP Cuff Size: Adult)   Pulse 75   Temp 36.4 °C (97.6 °F) (Temporal)   Ht 1.626 m (5' 4\")   Wt 89.3 kg (196 lb 12.8 oz)   SpO2 94%    BMI: Body mass index is 33.78 kg/m².  Physical Exam  Constitutional:       General: She is not in acute distress.     Appearance: Normal appearance. She is not ill-appearing.   HENT:      Right Ear: External ear normal.      Left Ear: External ear normal.      Mouth/Throat:      Comments: Mild hoarseness  Eyes:      General: No scleral icterus.     Conjunctiva/sclera: Conjunctivae normal.   Cardiovascular:      Rate and Rhythm: Normal rate and regular rhythm.      Pulses: Normal pulses.   Pulmonary:      Effort: Pulmonary effort is normal. No respiratory distress.      Breath sounds: No wheezing or rhonchi.   Abdominal:      General: Bowel sounds are normal.      Palpations: Abdomen is soft.   Musculoskeletal:      Right lower leg: No edema.      Left lower leg: No edema.   Skin:     General: Skin is warm and dry.      Findings: No rash.   Psychiatric:         Mood and Affect: Mood normal.         Behavior: Behavior normal.         Thought Content: Thought content normal.         Judgment: Judgment normal.           Results  Labs   - Thyroid levels: 03/2025, Normal   - Urine test: No protein detected    Assessment & Plan  1. Diabetes Mellitus type 2 with hyperglycemia.  - She is currently on Farxiga 10 mg for blood sugar control.  - No issues with the medication were reported.  - A1c and other relevant labs were drawn today.  - Prescriptions for Farxiga have been adjusted to a 1-month supply with 11 refills and sent to the pharmacy.    2. Asthma-COPD overlap syndrome.  - She uses Breo Ellipta as her maintenance inhaler and albuterol as needed.  - She reported no issues with these medications.  - " Prescriptions for Breo Ellipta have been adjusted to a 1-month supply with 11 refills and sent to the pharmacy.    3. Seasonal allergies.  - She uses Claritin as needed for allergy symptoms.  - She reported using Claritin-D recently for severe symptoms, which resolved her coughing.  - No changes to her allergy medication regimen were made.    4. Chronic low back pain.  - She uses Flexeril 5-10 mg (cyclobenzaprine) as needed for muscle spasms, approximately once every two to three months.  - She reported that increased hydration and daily magnesium supplementation have helped reduce the frequency of muscle cramps.  - No new medications or therapies were prescribed.    5. Chronic Kidney Disease, Stage 3a.  - She has mild chronic kidney disease.  - Previous urine tests showed no protein, indicating stable kidney function.  - Continued monitoring of kidney function through regular labs is recommended.    6. Hypothyroidism.  - She is on thyroid medication at 88 mcg.  - Thyroid levels were normal in 03/2025.  - No changes to her thyroid medication were made.    7. Dyslipidemia  - has declined prescription medicine for this issue previously, awaiting labs that were drawn this morning for next steps and recommendations.    Follow-up  The patient will follow up in 3 months.      Assessment and Plan:   Patrica is a 79 y.o. female with the following:  Problem List Items Addressed This Visit       Asthma-COPD overlap syndrome (HCC)    Relevant Medications    fluticasone furoate-vilanterol (BREO ELLIPTA) 200-25 MCG/ACT AEROSOL POWDER, BREATH ACTIVATED    Chronic low back pain    CKD stage 3a, GFR 45-59 ml/min    Relevant Orders    Comp Metabolic Panel    Dyslipidemia    Relevant Orders    VITAMIN B12    VITAMIN D,25 HYDROXY (DEFICIENCY)    Lipid Profile    Comp Metabolic Panel    Hypothyroidism    Relevant Orders    FREE THYROXINE    TSH    Type 2 diabetes mellitus with hyperglycemia, without long-term current use of insulin  ([facility]) - Primary    Relevant Medications    dapagliflozin propanediol (FARXIGA) 10 MG Tab    Other Relevant Orders    HEMOGLOBIN A1C    MICROALBUMIN CREAT RATIO URINE    Comp Metabolic Panel    CBC WITH DIFFERENTIAL     Other Visit Diagnoses         Seasonal allergies                   RTC: Return in about 3 months (around 9/16/2025).    I spent a total of 30 minutes with record review, exam, communication with the patient, communication with other providers, and documentation of this encounter.    Verbal consent was acquired by the patient to use [employer] ambient listening note generation during this visit Yes       PLEASE NOTE: This dictation was created using voice recognition software. I have made every reasonable attempt to correct obvious errors, but I expect that there are errors of grammar and possibly content that I did not discover before finalizing the note.      Jackelyn Trujillo,   Geriatric and Internal Medicine  [facility]          [1]   Current Outpatient Medications Ordered in Epic   Medication Sig Dispense Refill    dapagliflozin propanediol (FARXIGA) 10 MG Tab Take 1 Tablet by mouth every day. 30 Tablet 11    fluticasone furoate-vilanterol (BREO ELLIPTA) 200-25 MCG/ACT AEROSOL POWDER, BREATH ACTIVATED Inhale 1 Puff every day. 60 Each 11    albuterol 108 (90 Base) MCG/ACT Aero Soln inhalation aerosol Inhale 2 Puffs every 6 hours as needed for Shortness of Breath. 54 g 3    levothyroxine (SYNTHROID) 88 MCG Tab Take 1 Tablet by mouth every morning on an empty stomach. 100 Tablet 3    magnesium gluconate (MAG-G) 500 MG tablet Take 1,000 mg by mouth every day.      cyclobenzaprine (FLEXERIL) 5 mg tablet Take 1-2 Tablets by mouth every evening. 200 Tablet 3    Ascorbic Acid (VITAMIN C) 1000 MG Tab Take 1 Tablet by mouth.      multivitamin (THERAGRAN) Tab Take 1 Tablet by mouth every day.      vitamin D3 (CHOLECALCIFEROL) 1000 Unit (25 mcg) Tab Take 1 Tablet by mouth every day.      vitamin  e (VITAMIN E) 400 UNIT Cap Take 1 Capsule by mouth every day.      calcium citrate (CALCITRATE) 950 (200 Ca) MG Tab Take 1 Tablet by mouth every day.      loratadine (CLARITIN) 10 MG Tab Take 1 Tablet by mouth every day.       No current [facility]-ordered facility-administered medications on file.

## 2025-07-08 DIAGNOSIS — J45.40 MODERATE PERSISTENT ASTHMA WITHOUT COMPLICATION: ICD-10-CM

## 2025-07-08 RX ORDER — FLUTICASONE FUROATE AND VILANTEROL 200; 25 UG/1; UG/1
1 POWDER RESPIRATORY (INHALATION) DAILY
Qty: 180 EACH | Refills: 3 | OUTPATIENT
Start: 2025-07-08

## 2025-09-30 ENCOUNTER — APPOINTMENT (OUTPATIENT)
Dept: MEDICAL GROUP | Facility: PHYSICIAN GROUP | Age: 80
End: 2025-09-30
Payer: MEDICARE